# Patient Record
Sex: FEMALE | Race: BLACK OR AFRICAN AMERICAN | Employment: OTHER | URBAN - METROPOLITAN AREA
[De-identification: names, ages, dates, MRNs, and addresses within clinical notes are randomized per-mention and may not be internally consistent; named-entity substitution may affect disease eponyms.]

---

## 2021-06-04 ENCOUNTER — APPOINTMENT (OUTPATIENT)
Dept: CT IMAGING | Age: 76
DRG: 065 | End: 2021-06-04
Attending: STUDENT IN AN ORGANIZED HEALTH CARE EDUCATION/TRAINING PROGRAM
Payer: MEDICARE

## 2021-06-04 ENCOUNTER — HOSPITAL ENCOUNTER (INPATIENT)
Age: 76
LOS: 8 days | Discharge: NURSING, REHAB OR LONG-TERM CARE | DRG: 065 | End: 2021-06-12
Attending: STUDENT IN AN ORGANIZED HEALTH CARE EDUCATION/TRAINING PROGRAM | Admitting: FAMILY MEDICINE
Payer: MEDICARE

## 2021-06-04 ENCOUNTER — APPOINTMENT (OUTPATIENT)
Dept: MRI IMAGING | Age: 76
DRG: 065 | End: 2021-06-04
Attending: FAMILY MEDICINE
Payer: MEDICARE

## 2021-06-04 DIAGNOSIS — R29.810 FACIAL DROOP: ICD-10-CM

## 2021-06-04 DIAGNOSIS — I63.9 ACUTE ISCHEMIC STROKE (HCC): Primary | ICD-10-CM

## 2021-06-04 DIAGNOSIS — I67.2 INTRACRANIAL ATHEROSCLEROSIS: ICD-10-CM

## 2021-06-04 DIAGNOSIS — R47.1 DYSARTHRIA: ICD-10-CM

## 2021-06-04 DIAGNOSIS — E11.65 TYPE 2 DIABETES MELLITUS WITH HYPERGLYCEMIA, WITHOUT LONG-TERM CURRENT USE OF INSULIN (HCC): ICD-10-CM

## 2021-06-04 DIAGNOSIS — I67.9 INTRACRANIAL VASCULAR STENOSIS: ICD-10-CM

## 2021-06-04 LAB
ALBUMIN SERPL-MCNC: 3.2 G/DL (ref 3.5–5)
ALBUMIN/GLOB SERPL: 0.9 {RATIO} (ref 1.1–2.2)
ALP SERPL-CCNC: 103 U/L (ref 45–117)
ALT SERPL-CCNC: 17 U/L (ref 12–78)
ANION GAP SERPL CALC-SCNC: 6 MMOL/L (ref 5–15)
ASPIRIN TEST, ASPIRN: 393 ARU
AST SERPL-CCNC: 24 U/L (ref 15–37)
ATRIAL RATE: 67 BPM
BASOPHILS # BLD: 0 K/UL (ref 0–0.1)
BASOPHILS NFR BLD: 0 % (ref 0–1)
BILIRUB SERPL-MCNC: 0.3 MG/DL (ref 0.2–1)
BUN SERPL-MCNC: 24 MG/DL (ref 6–20)
BUN/CREAT SERPL: 17 (ref 12–20)
CALCIUM SERPL-MCNC: 8.9 MG/DL (ref 8.5–10.1)
CALCULATED P AXIS, ECG09: 57 DEGREES
CALCULATED R AXIS, ECG10: 10 DEGREES
CALCULATED T AXIS, ECG11: -97 DEGREES
CHLORIDE SERPL-SCNC: 107 MMOL/L (ref 97–108)
CK SERPL-CCNC: 255 U/L (ref 26–192)
CO2 SERPL-SCNC: 22 MMOL/L (ref 21–32)
COMMENT, HOLDF: NORMAL
CREAT SERPL-MCNC: 1.45 MG/DL (ref 0.55–1.02)
DIAGNOSIS, 93000: NORMAL
DIFFERENTIAL METHOD BLD: ABNORMAL
EOSINOPHIL # BLD: 0.1 K/UL (ref 0–0.4)
EOSINOPHIL NFR BLD: 2 % (ref 0–7)
ERYTHROCYTE [DISTWIDTH] IN BLOOD BY AUTOMATED COUNT: 15.3 % (ref 11.5–14.5)
GLOBULIN SER CALC-MCNC: 3.5 G/DL (ref 2–4)
GLUCOSE SERPL-MCNC: 213 MG/DL (ref 65–100)
HCT VFR BLD AUTO: 28 % (ref 35–47)
HGB BLD-MCNC: 8.4 G/DL (ref 11.5–16)
IMM GRANULOCYTES # BLD AUTO: 0 K/UL (ref 0–0.04)
IMM GRANULOCYTES NFR BLD AUTO: 0 % (ref 0–0.5)
INR PPP: 1.1 (ref 0.9–1.1)
IRON SATN MFR SERPL: 21 % (ref 20–50)
IRON SERPL-MCNC: 48 UG/DL (ref 35–150)
LYMPHOCYTES # BLD: 1.7 K/UL (ref 0.8–3.5)
LYMPHOCYTES NFR BLD: 30 % (ref 12–49)
MCH RBC QN AUTO: 25.2 PG (ref 26–34)
MCHC RBC AUTO-ENTMCNC: 30 G/DL (ref 30–36.5)
MCV RBC AUTO: 84.1 FL (ref 80–99)
MONOCYTES # BLD: 0.5 K/UL (ref 0–1)
MONOCYTES NFR BLD: 8 % (ref 5–13)
NEUTS SEG # BLD: 3.3 K/UL (ref 1.8–8)
NEUTS SEG NFR BLD: 60 % (ref 32–75)
NRBC # BLD: 0 K/UL (ref 0–0.01)
NRBC BLD-RTO: 0 PER 100 WBC
P-R INTERVAL, ECG05: 166 MS
P2Y12 PLT RESPONSE,PPPR: 280 PRU (ref 194–418)
PLATELET # BLD AUTO: 130 K/UL (ref 150–400)
PMV BLD AUTO: 12.6 FL (ref 8.9–12.9)
POTASSIUM SERPL-SCNC: 4.4 MMOL/L (ref 3.5–5.1)
PROT SERPL-MCNC: 6.7 G/DL (ref 6.4–8.2)
PROTHROMBIN TIME: 11 SEC (ref 9–11.1)
Q-T INTERVAL, ECG07: 398 MS
QRS DURATION, ECG06: 84 MS
QTC CALCULATION (BEZET), ECG08: 420 MS
RBC # BLD AUTO: 3.33 M/UL (ref 3.8–5.2)
SAMPLES BEING HELD,HOLD: NORMAL
SODIUM SERPL-SCNC: 135 MMOL/L (ref 136–145)
TIBC SERPL-MCNC: 226 UG/DL (ref 250–450)
TROPONIN I SERPL-MCNC: <0.05 NG/ML
TROPONIN I SERPL-MCNC: <0.05 NG/ML
VENTRICULAR RATE, ECG03: 67 BPM
WBC # BLD AUTO: 5.6 K/UL (ref 3.6–11)

## 2021-06-04 PROCEDURE — 70551 MRI BRAIN STEM W/O DYE: CPT

## 2021-06-04 PROCEDURE — 70450 CT HEAD/BRAIN W/O DYE: CPT

## 2021-06-04 PROCEDURE — 85576 BLOOD PLATELET AGGREGATION: CPT

## 2021-06-04 PROCEDURE — 36415 COLL VENOUS BLD VENIPUNCTURE: CPT

## 2021-06-04 PROCEDURE — 74011250637 HC RX REV CODE- 250/637: Performed by: FAMILY MEDICINE

## 2021-06-04 PROCEDURE — 0042T CT CODE NEURO PERF W CBF: CPT

## 2021-06-04 PROCEDURE — 80053 COMPREHEN METABOLIC PANEL: CPT

## 2021-06-04 PROCEDURE — 74011000636 HC RX REV CODE- 636: Performed by: RADIOLOGY

## 2021-06-04 PROCEDURE — 93005 ELECTROCARDIOGRAM TRACING: CPT

## 2021-06-04 PROCEDURE — 83540 ASSAY OF IRON: CPT

## 2021-06-04 PROCEDURE — 74011250637 HC RX REV CODE- 250/637: Performed by: STUDENT IN AN ORGANIZED HEALTH CARE EDUCATION/TRAINING PROGRAM

## 2021-06-04 PROCEDURE — 65660000000 HC RM CCU STEPDOWN

## 2021-06-04 PROCEDURE — 85610 PROTHROMBIN TIME: CPT

## 2021-06-04 PROCEDURE — 74011250636 HC RX REV CODE- 250/636: Performed by: FAMILY MEDICINE

## 2021-06-04 PROCEDURE — 85025 COMPLETE CBC W/AUTO DIFF WBC: CPT

## 2021-06-04 PROCEDURE — 74011000250 HC RX REV CODE- 250: Performed by: FAMILY MEDICINE

## 2021-06-04 PROCEDURE — 4A03X5D MEASUREMENT OF ARTERIAL FLOW, INTRACRANIAL, EXTERNAL APPROACH: ICD-10-PCS | Performed by: RADIOLOGY

## 2021-06-04 PROCEDURE — 99285 EMERGENCY DEPT VISIT HI MDM: CPT

## 2021-06-04 PROCEDURE — 84484 ASSAY OF TROPONIN QUANT: CPT

## 2021-06-04 PROCEDURE — 70498 CT ANGIOGRAPHY NECK: CPT

## 2021-06-04 PROCEDURE — 82550 ASSAY OF CK (CPK): CPT

## 2021-06-04 RX ORDER — SODIUM CHLORIDE 9 MG/ML
75 INJECTION, SOLUTION INTRAVENOUS CONTINUOUS
Status: DISPENSED | OUTPATIENT
Start: 2021-06-04 | End: 2021-06-05

## 2021-06-04 RX ORDER — CLOPIDOGREL BISULFATE 75 MG/1
75 TABLET ORAL DAILY
Status: DISCONTINUED | OUTPATIENT
Start: 2021-06-05 | End: 2021-06-05

## 2021-06-04 RX ORDER — ACETAMINOPHEN 650 MG/1
650 SUPPOSITORY RECTAL
Status: DISCONTINUED | OUTPATIENT
Start: 2021-06-04 | End: 2021-06-12 | Stop reason: HOSPADM

## 2021-06-04 RX ORDER — CLOPIDOGREL BISULFATE 75 MG/1
75 TABLET ORAL
Status: COMPLETED | OUTPATIENT
Start: 2021-06-04 | End: 2021-06-04

## 2021-06-04 RX ORDER — GUAIFENESIN 100 MG/5ML
81 LIQUID (ML) ORAL DAILY
Status: DISCONTINUED | OUTPATIENT
Start: 2021-06-05 | End: 2021-06-12 | Stop reason: HOSPADM

## 2021-06-04 RX ORDER — ACETAMINOPHEN 325 MG/1
650 TABLET ORAL
Status: DISCONTINUED | OUTPATIENT
Start: 2021-06-04 | End: 2021-06-12 | Stop reason: HOSPADM

## 2021-06-04 RX ORDER — ATORVASTATIN CALCIUM 40 MG/1
80 TABLET, FILM COATED ORAL
Status: DISCONTINUED | OUTPATIENT
Start: 2021-06-04 | End: 2021-06-12 | Stop reason: HOSPADM

## 2021-06-04 RX ADMIN — FAMOTIDINE 20 MG: 10 INJECTION, SOLUTION INTRAVENOUS at 18:54

## 2021-06-04 RX ADMIN — SODIUM CHLORIDE 75 ML/HR: 9 INJECTION, SOLUTION INTRAVENOUS at 19:37

## 2021-06-04 RX ADMIN — CLOPIDOGREL BISULFATE 75 MG: 75 TABLET ORAL at 14:21

## 2021-06-04 RX ADMIN — IOPAMIDOL 40 ML: 755 INJECTION, SOLUTION INTRAVENOUS at 11:03

## 2021-06-04 RX ADMIN — IOPAMIDOL 80 ML: 755 INJECTION, SOLUTION INTRAVENOUS at 11:04

## 2021-06-04 RX ADMIN — ATORVASTATIN CALCIUM 80 MG: 40 TABLET, FILM COATED ORAL at 22:07

## 2021-06-04 NOTE — ED NOTES
Bedside shift change report given to Nyla Waller RN  (oncoming nurse) by Nuno Newman RN (offgoing nurse). Report included the following information SBAR, Kardex, ED Summary, MAR, Accordion and Recent Results.

## 2021-06-04 NOTE — H&P
History & Physical    Primary Care Provider: Unknown, Provider  Source of Information: Patient     History of Presenting Illness: Osvaldo Phillips is a 77 y.o. female who presents with dysarthria    History is primary obtained from the patient    Patient reports that she went to bed at [facility] at around 10:00 last night. Woke up this morning and had a headache associated with slurred speech. Patient reports that she has had a stroke in the past and has left-sided deficit. Patient came to the ER as a code stroke, patient currently reports that all her symptoms have resolved. Patient denies any other complaints or problems. Patient was requested to be admitted to the hospitalist service. Patient denies any recent falls or injuries. The patient denies any Headache, blurry vision, sore throat, trouble swallowing, trouble with speech, chest pain, SOB, cough, fever, chills, N/V/D, abd pain, urinary symptoms, constipation, recent travels, sick contacts, focal or generalized neurological symptoms,  falls, injuries, rashes, contact with COVID-19 diagnosed patients, hematemesis, melena, hemoptysis, hematuria, rashes, denies starting any new medications and denies any other concerns or problems besides as mentioned above. Review of Systems:  A comprehensive review of systems was negative except for that written in the History of Present Illness. All other systems reviewed, pertinent positives and negatives noted in HPI    No past medical history on file. No past surgical history on file. Prior to Admission medications    Not on File     No Known Allergies   No family history on file. Family history was discussed with the patient, all pertinent and relevant details are mentioned as above, no other pertinent and relevant family history was noted on my discussion with the patient.   Patient specifically denies any history of Gaucher disease in the family  SOCIAL HISTORY:  Patient resides:  Independently x   Assisted Living    SNF    With family care       Smoking history:   None x   Former    Chronic      Alcohol history:   None    Social x   Chronic      Ambulates:   Independently x   w/cane    w/walker    w/wc    CODE STATUS:  DNR    Full x   Other      Objective:     Physical Exam:     Visit Vitals  BP (!) 168/97   Pulse 86   Temp 98.9 °F (37.2 °C)   Resp 12   Ht 5' 4\" (1.626 m)   Wt 77.1 kg (170 lb)   SpO2 94%   BMI 29.18 kg/m²      O2 Device: None (Room air)    General : alert x 3, awake, no acute distress, resting in bed, pleasant /female, appears to be stated age  HEENT: PEERL, EOMI, moist mucus membrane, TM clear  Neck: supple,   Chest: Clear to auscultation bilaterally   CVS: S1 S2 heard,   Abd: soft/ Non tender, non distended, BS physiological,   Ext: no clubbing, no cyanosis,   Neuro/Psych: pleasant mood and affect, CN 2-12 grossly intact, sensory grossly within normal limit, Strength 4/5 in LUE and LL, 5/5 right upper and right lower extremity weakness, DTR 1+ x 4  Skin: warm     EKG: Normal sinus rhythm with nonspecific ST changes. Data Review:     Recent Days:  Recent Labs     06/04/21  1106   WBC 5.6   HGB 8.4*   HCT 28.0*   *     Recent Labs     06/04/21  1106   *   K 4.4      CO2 22   *   BUN 24*   CREA 1.45*   CA 8.9   ALB 3.2*   ALT 17   INR 1.1     No results for input(s): PH, PCO2, PO2, HCO3, FIO2 in the last 72 hours. 24 Hour Results:  Recent Results (from the past 24 hour(s))   SAMPLES BEING HELD    Collection Time: 06/04/21 11:06 AM   Result Value Ref Range    SAMPLES BEING HELD 1RED     COMMENT        Add-on orders for these samples will be processed based on acceptable specimen integrity and analyte stability, which may vary by analyte.    CBC WITH AUTOMATED DIFF    Collection Time: 06/04/21 11:06 AM   Result Value Ref Range    WBC 5.6 3.6 - 11.0 K/uL    RBC 3.33 (L) 3.80 - 5.20 M/uL    HGB 8.4 (L) 11.5 - 16.0 g/dL HCT 28.0 (L) 35.0 - 47.0 %    MCV 84.1 80.0 - 99.0 FL    MCH 25.2 (L) 26.0 - 34.0 PG    MCHC 30.0 30.0 - 36.5 g/dL    RDW 15.3 (H) 11.5 - 14.5 %    PLATELET 767 (L) 721 - 400 K/uL    MPV 12.6 8.9 - 12.9 FL    NRBC 0.0 0  WBC    ABSOLUTE NRBC 0.00 0.00 - 0.01 K/uL    NEUTROPHILS 60 32 - 75 %    LYMPHOCYTES 30 12 - 49 %    MONOCYTES 8 5 - 13 %    EOSINOPHILS 2 0 - 7 %    BASOPHILS 0 0 - 1 %    IMMATURE GRANULOCYTES 0 0.0 - 0.5 %    ABS. NEUTROPHILS 3.3 1.8 - 8.0 K/UL    ABS. LYMPHOCYTES 1.7 0.8 - 3.5 K/UL    ABS. MONOCYTES 0.5 0.0 - 1.0 K/UL    ABS. EOSINOPHILS 0.1 0.0 - 0.4 K/UL    ABS. BASOPHILS 0.0 0.0 - 0.1 K/UL    ABS. IMM. GRANS. 0.0 0.00 - 0.04 K/UL    DF AUTOMATED     METABOLIC PANEL, COMPREHENSIVE    Collection Time: 06/04/21 11:06 AM   Result Value Ref Range    Sodium 135 (L) 136 - 145 mmol/L    Potassium 4.4 3.5 - 5.1 mmol/L    Chloride 107 97 - 108 mmol/L    CO2 22 21 - 32 mmol/L    Anion gap 6 5 - 15 mmol/L    Glucose 213 (H) 65 - 100 mg/dL    BUN 24 (H) 6 - 20 MG/DL    Creatinine 1.45 (H) 0.55 - 1.02 MG/DL    BUN/Creatinine ratio 17 12 - 20      GFR est AA 43 (L) >60 ml/min/1.73m2    GFR est non-AA 35 (L) >60 ml/min/1.73m2    Calcium 8.9 8.5 - 10.1 MG/DL    Bilirubin, total 0.3 0.2 - 1.0 MG/DL    ALT (SGPT) 17 12 - 78 U/L    AST (SGOT) 24 15 - 37 U/L    Alk. phosphatase 103 45 - 117 U/L    Protein, total 6.7 6.4 - 8.2 g/dL    Albumin 3.2 (L) 3.5 - 5.0 g/dL    Globulin 3.5 2.0 - 4.0 g/dL    A-G Ratio 0.9 (L) 1.1 - 2.2     PROTHROMBIN TIME + INR    Collection Time: 06/04/21 11:06 AM   Result Value Ref Range    INR 1.1 0.9 - 1.1      Prothrombin time 11.0 9.0 - 11.1 sec   TROPONIN I    Collection Time: 06/04/21 11:06 AM   Result Value Ref Range    Troponin-I, Qt. <0.05 <0.05 ng/mL         Imaging:     CTA CODE NEURO HEAD AND NECK W CONT    Result Date: 6/4/2021  1. Small matched perfusion defect in the location of the patient's chronic right frontal infarct.  Otherwise negative CT perfusion study, without perfusion mismatch. 2. No acute large vessel occlusion. 3. Moderate left internal carotid artery origin stenosis. Moderate to severe bilateral cavernous carotid stenoses. Irregularity of the middle cerebral arteries bilaterally without focal flow-limiting stenosis. CT CODE NEURO HEAD WO CONTRAST    Result Date: 6/4/2021  Old right frontal infarct and significant small vessel ischemic changes. No acute abnormality. CT CODE NEURO PERF W CBF    Result Date: 6/4/2021  1. Small matched perfusion defect in the location of the patient's chronic right frontal infarct. Otherwise negative CT perfusion study, without perfusion mismatch. 2. No acute large vessel occlusion. 3. Moderate left internal carotid artery origin stenosis. Moderate to severe bilateral cavernous carotid stenoses. Irregularity of the middle cerebral arteries bilaterally without focal flow-limiting stenosis. Assessment/Plan     TIA: Patient will be admitted on a telemetry bed, neurovascular checks, dual antiplatelets, aspirin and Plavix testing, neurovascular checks, MRI of the brain, echocardiogram, PT OT speech consult, supportive care, neurology consult, further intervention hospitalist, reassess as needed, continue monitor    Diabetes mellitus type 2: Patient with sliding scale along insulin, Accu-Cheks, diet control, close monitoring, further address per hospitalist, reassess as needed    CKD 3: Avoid nephrotoxic medication, renally dose all medication, supportive care, close monitoring, further intervention per hospital course, reassess as needed, trend creatinine      Anemia: Unclear whether acute on chronic, closely monitor, obtain records from PCP, iron panel, patient denies any hematemesis melena hemoptysis, reassess as needed    GI DVT prophylaxis: Patient will be on SCDs                         Please note that this dictation was completed with [employer], the [employer] voice recognition software.   Quite often unanticipated grammatical, syntax, homophones, and other interpretive errors are inadvertently transcribed by the computer software. Please disregard these errors. Please excuse any errors that have escaped final proofreading.          Signed By: Pavel Jurado MD     June 4, 2021

## 2021-06-04 NOTE — ED PROVIDER NOTES
Chief Complaint   Patient presents with    Dysarthria     This is a 29-year-old female with a history of previous stroke, the patient is what of a challenging historian and cannot tell me exactly what her residual deficits are only that she has had difficulty walking since then, she presents today by EMS as a code neuro activation for a complaint of severe headache that began at 0800 this morning associated with slurred speech. Last known normal when she went to sleep yesterday evening at 2100. She also endorses left-sided weakness and numbness on my exam.  Denies any anticoagulants. Accu-Chek was 260s in the field. She denies any neck pain or vomiting but does endorse some nausea, no complaints of vision changes. No chest or abdominal pain. She uses a cane at baseline and per EMS was able to walk at the time of their arrival with a steady gait. No other recent changes in her health. Symptoms are severe in nature without alleviating or exacerbating factors. No past medical history on file. CORRECTION:  Obtained by me, includes previous stroke    No past surgical history on file. CORRECTION:  Obtained by me, no prior CNS surgery      No family history on file.      CORRECTION:  Obtained by me, social history negative for drug or alcohol use    Social History     Socioeconomic History    Marital status: Not on file     Spouse name: Not on file    Number of children: Not on file    Years of education: Not on file    Highest education level: Not on file   Occupational History    Not on file   Tobacco Use    Smoking status: Not on file   Substance and Sexual Activity    Alcohol use: Not on file    Drug use: Not on file    Sexual activity: Not on file   Other Topics Concern    Not on file   Social History Narrative    Not on file     Social Determinants of Health     Financial Resource Strain:     Difficulty of Paying Living Expenses:    Food Insecurity:     Worried About Running Out of Food in the Last Year:    [address] in the Last Year:    Transportation Needs:     Lack of Transportation (Medical):  Lack of Transportation (Non-Medical):    Physical Activity:     Days of Exercise per Week:     Minutes of Exercise per Session:    Stress:     Feeling of Stress :    Social Connections:     Frequency of Communication with Friends and Family:     Frequency of Social Gatherings with Friends and Family:     Attends Christianity Services:     Active Member of Clubs or Organizations:     Attends Club or Organization Meetings:     Marital Status:    Intimate Partner Violence:     Fear of Current or Ex-Partner:     Emotionally Abused:     Physically Abused:     Sexually Abused: ALLERGIES: Patient has no known allergies. Review of Systems   Constitutional: Negative for fever. HENT: Negative for facial swelling. Eyes: Negative for visual disturbance. Respiratory: Positive for shortness of breath. Cardiovascular: Negative for chest pain. Gastrointestinal: Positive for nausea. Negative for abdominal pain and vomiting. Musculoskeletal: Negative for neck pain. Skin: Negative for rash. Neurological: Positive for weakness, numbness and headaches. Psychiatric/Behavioral: Positive for confusion. Vitals:    06/04/21 1108 06/04/21 1118 06/04/21 1215   BP: (!) 155/87 (!) 160/85 (!) 145/87   Pulse: 73 76 72   Resp: 18 14 17   Temp: 98.9 °F (37.2 °C)     SpO2: 96% 96% 98%   Weight: 77.1 kg (170 lb)     Height: 5' 4\" (1.626 m)              Physical Exam  General:  Awake and alert, NAD  HEENT:  NC/AT, equal pupils, moist mucous membranes  Neck:   Normal inspection, full range of motion  Cardiac:  RRR, no murmurs  Respiratory:  Clear bilaterally, no wheezes, rales, rhonchi  Abdomen:  Soft and nontender, nondistended  Extremities: Warm and well perfused, no peripheral edema  Neuro:  +Mild left sided facial asymmetry, mild to moderate dysarthria, no expressive aphasia.   Left pronator drift and diminished sensation subjectively to gross touch in the left upper and lower extremities  Skin:   No rashes or pallor    RESULTS  Recent Results (from the past 12 hour(s))   SAMPLES BEING HELD    Collection Time: 06/04/21 11:06 AM   Result Value Ref Range    SAMPLES BEING HELD 1RED     COMMENT        Add-on orders for these samples will be processed based on acceptable specimen integrity and analyte stability, which may vary by analyte. CBC WITH AUTOMATED DIFF    Collection Time: 06/04/21 11:06 AM   Result Value Ref Range    WBC 5.6 3.6 - 11.0 K/uL    RBC 3.33 (L) 3.80 - 5.20 M/uL    HGB 8.4 (L) 11.5 - 16.0 g/dL    HCT 28.0 (L) 35.0 - 47.0 %    MCV 84.1 80.0 - 99.0 FL    MCH 25.2 (L) 26.0 - 34.0 PG    MCHC 30.0 30.0 - 36.5 g/dL    RDW 15.3 (H) 11.5 - 14.5 %    PLATELET 464 (L) 204 - 400 K/uL    MPV 12.6 8.9 - 12.9 FL    NRBC 0.0 0  WBC    ABSOLUTE NRBC 0.00 0.00 - 0.01 K/uL    NEUTROPHILS 60 32 - 75 %    LYMPHOCYTES 30 12 - 49 %    MONOCYTES 8 5 - 13 %    EOSINOPHILS 2 0 - 7 %    BASOPHILS 0 0 - 1 %    IMMATURE GRANULOCYTES 0 0.0 - 0.5 %    ABS. NEUTROPHILS 3.3 1.8 - 8.0 K/UL    ABS. LYMPHOCYTES 1.7 0.8 - 3.5 K/UL    ABS. MONOCYTES 0.5 0.0 - 1.0 K/UL    ABS. EOSINOPHILS 0.1 0.0 - 0.4 K/UL    ABS. BASOPHILS 0.0 0.0 - 0.1 K/UL    ABS. IMM. GRANS. 0.0 0.00 - 0.04 K/UL    DF AUTOMATED     METABOLIC PANEL, COMPREHENSIVE    Collection Time: 06/04/21 11:06 AM   Result Value Ref Range    Sodium 135 (L) 136 - 145 mmol/L    Potassium 4.4 3.5 - 5.1 mmol/L    Chloride 107 97 - 108 mmol/L    CO2 22 21 - 32 mmol/L    Anion gap 6 5 - 15 mmol/L    Glucose 213 (H) 65 - 100 mg/dL    BUN 24 (H) 6 - 20 MG/DL    Creatinine 1.45 (H) 0.55 - 1.02 MG/DL    BUN/Creatinine ratio 17 12 - 20      GFR est AA 43 (L) >60 ml/min/1.73m2    GFR est non-AA 35 (L) >60 ml/min/1.73m2    Calcium 8.9 8.5 - 10.1 MG/DL    Bilirubin, total 0.3 0.2 - 1.0 MG/DL    ALT (SGPT) 17 12 - 78 U/L    AST (SGOT) 24 15 - 37 U/L    Alk. phosphatase 103 45 - 117 U/L    Protein, total 6.7 6.4 - 8.2 g/dL    Albumin 3.2 (L) 3.5 - 5.0 g/dL    Globulin 3.5 2.0 - 4.0 g/dL    A-G Ratio 0.9 (L) 1.1 - 2.2     PROTHROMBIN TIME + INR    Collection Time: 06/04/21 11:06 AM   Result Value Ref Range    INR 1.1 0.9 - 1.1      Prothrombin time 11.0 9.0 - 11.1 sec   TROPONIN I    Collection Time: 06/04/21 11:06 AM   Result Value Ref Range    Troponin-I, Qt. <0.05 <0.05 ng/mL        IMAGING  CTA CODE NEURO HEAD AND NECK W CONT    Result Date: 6/4/2021  1. Small matched perfusion defect in the location of the patient's chronic right frontal infarct. Otherwise negative CT perfusion study, without perfusion mismatch. 2. No acute large vessel occlusion. 3. Moderate left internal carotid artery origin stenosis. Moderate to severe bilateral cavernous carotid stenoses. Irregularity of the middle cerebral arteries bilaterally without focal flow-limiting stenosis. CT CODE NEURO HEAD WO CONTRAST    Result Date: 6/4/2021  Old right frontal infarct and significant small vessel ischemic changes. No acute abnormality. CT CODE NEURO PERF W CBF    Result Date: 6/4/2021  1. Small matched perfusion defect in the location of the patient's chronic right frontal infarct. Otherwise negative CT perfusion study, without perfusion mismatch. 2. No acute large vessel occlusion. 3. Moderate left internal carotid artery origin stenosis. Moderate to severe bilateral cavernous carotid stenoses. Irregularity of the middle cerebral arteries bilaterally without focal flow-limiting stenosis. Procedures - none unless documented below  EKG as interpreted by me:  Normal sinus rhythm at a rate of 67, normal axis and intervals, T wave inversions in the lateral precordial leads, no ST elevations suggesting acute ischemia. ED course: Labs, EKG and imaging reviewed. No large vessel occlusion or hemorrhage on imaging.   NIH stroke score of 4 at the time of my initial exam.  Given her LNN>4.5 hours ago she is not an appropriate candidate for systemic thrombolytics. Case discussed with teleneurology [facility]) who recommended Plavix 75 mg in addition to her home dose of aspirin, admission for MRI and continued stroke investigation. Discussed with the hospitalist.    [address] for Admission  1:11 PM    ED Room Number:   ER24/24  Patient Name and age: Javon Mcqueen 77 y.o.  female  Working Diagnosis:     1. Acute ischemic stroke ([address]    2. Facial droop    3. Dysarthria      COVID suspicion:   no  Code Status:    Full Code  Readmission:    no  Isolation Requirements:  no  Recommended Level of Care: telemetry  Department:    [address] [place] [address] Adult ED - 21   Other:     Code neuro activation for left sided facial droop, dysarthria, left upper weakness and sensory deficits in the left upper and lowers, hx prior stroke. Unclear how much is acute versus chronic. Seen by teleneurology who recommended Plavix 75 mg and MRI.

## 2021-06-04 NOTE — ED TRIAGE NOTES
Triage Note: Patient arrives via EMS with Stroke Alert. LKW was last night at 2100. Patient is coming in with slurred speech upon waking this morning and headache. Patient has had Stroke in the past with left sided deficit.   and /82

## 2021-06-05 ENCOUNTER — APPOINTMENT (OUTPATIENT)
Dept: NON INVASIVE DIAGNOSTICS | Age: 76
DRG: 065 | End: 2021-06-05
Attending: FAMILY MEDICINE
Payer: MEDICARE

## 2021-06-05 ENCOUNTER — APPOINTMENT (OUTPATIENT)
Dept: GENERAL RADIOLOGY | Age: 76
DRG: 065 | End: 2021-06-05
Attending: INTERNAL MEDICINE
Payer: MEDICARE

## 2021-06-05 PROBLEM — I67.1 ANEURYSM OF MIDDLE CEREBRAL ARTERY: Status: ACTIVE | Noted: 2021-06-04

## 2021-06-05 PROBLEM — I65.21 STENOSIS OF RIGHT CAROTID ARTERY: Status: ACTIVE | Noted: 2021-06-04

## 2021-06-05 PROBLEM — I67.2 INTRACRANIAL ATHEROSCLEROSIS: Status: ACTIVE | Noted: 2021-06-04

## 2021-06-05 LAB
CHOLEST SERPL-MCNC: 243 MG/DL
CK SERPL-CCNC: 319 U/L (ref 26–192)
CRP SERPL HS-MCNC: 5.1 MG/L
ECHO AO ROOT DIAM: 2.98 CM
ECHO AV AREA PEAK VELOCITY: 1.92 CM2
ECHO AV AREA/BSA PEAK VELOCITY: 1 CM2/M2
ECHO AV PEAK GRADIENT: 7.62 MMHG
ECHO AV PEAK VELOCITY: 137.98 CM/S
ECHO LA AREA 4C: 21.37 CM2
ECHO LA MAJOR AXIS: 3.88 CM
ECHO LA MINOR AXIS: 2.07 CM
ECHO LA VOL 2C: 57.49 ML (ref 22–52)
ECHO LA VOL 4C: 62.38 ML (ref 22–52)
ECHO LA VOL BP: 65.84 ML (ref 22–52)
ECHO LA VOL/BSA BIPLANE: 35.21 ML/M2 (ref 16–28)
ECHO LA VOLUME INDEX A2C: 30.74 ML/M2 (ref 16–28)
ECHO LA VOLUME INDEX A4C: 33.36 ML/M2 (ref 16–28)
ECHO LV E' LATERAL VELOCITY: 5.35 CM/S
ECHO LV E' SEPTAL VELOCITY: 3.59 CM/S
ECHO LV INTERNAL DIMENSION DIASTOLIC: 3.98 CM (ref 3.9–5.3)
ECHO LV INTERNAL DIMENSION SYSTOLIC: 2.58 CM
ECHO LV IVSD: 1.3 CM (ref 0.6–0.9)
ECHO LV MASS 2D: 178.8 G (ref 67–162)
ECHO LV MASS INDEX 2D: 95.6 G/M2 (ref 43–95)
ECHO LV POSTERIOR WALL DIASTOLIC: 1.24 CM (ref 0.6–0.9)
ECHO LVOT DIAM: 2 CM
ECHO LVOT PEAK GRADIENT: 2.87 MMHG
ECHO LVOT PEAK VELOCITY: 84.7 CM/S
ECHO MV A VELOCITY: 84.99 CM/S
ECHO MV AREA PHT: 4.54 CM2
ECHO MV E DECELERATION TIME (DT): 166.93 MS
ECHO MV E VELOCITY: 67.53 CM/S
ECHO MV E/A RATIO: 0.79
ECHO MV E/E' LATERAL: 12.62
ECHO MV E/E' RATIO (AVERAGED): 15.72
ECHO MV E/E' SEPTAL: 18.81
ECHO MV PRESSURE HALF TIME (PHT): 48.41 MS
ECHO PV MAX VELOCITY: 89.11 CM/S
ECHO PV PEAK INSTANTANEOUS GRADIENT SYSTOLIC: 3.18 MMHG
ECHO PV REGURGITANT MAX VELOCITY: 114.17 CM/S
ECHO RV INTERNAL DIMENSION: 3.11 CM
ECHO RV TAPSE: 1.66 CM (ref 1.5–2)
ECHO TV REGURGITANT MAX VELOCITY: 276.6 CM/S
ECHO TV REGURGITANT PEAK GRADIENT: 30.6 MMHG
ERYTHROCYTE [DISTWIDTH] IN BLOOD BY AUTOMATED COUNT: 15.4 % (ref 11.5–14.5)
ERYTHROCYTE [SEDIMENTATION RATE] IN BLOOD: 46 MM/HR (ref 0–30)
EST. AVERAGE GLUCOSE BLD GHB EST-MCNC: 189 MG/DL
GLUCOSE BLD STRIP.AUTO-MCNC: 310 MG/DL (ref 65–117)
HBA1C MFR BLD: 8.2 % (ref 4–5.6)
HCT VFR BLD AUTO: 30.9 % (ref 35–47)
HDLC SERPL-MCNC: 37 MG/DL
HDLC SERPL: 6.6 {RATIO} (ref 0–5)
HGB BLD-MCNC: 9.5 G/DL (ref 11.5–16)
LDLC SERPL CALC-MCNC: ABNORMAL MG/DL (ref 0–100)
LDLC SERPL DIRECT ASSAY-MCNC: 82 MG/DL (ref 0–100)
MCH RBC QN AUTO: 25.3 PG (ref 26–34)
MCHC RBC AUTO-ENTMCNC: 30.7 G/DL (ref 30–36.5)
MCV RBC AUTO: 82.4 FL (ref 80–99)
NRBC # BLD: 0 K/UL (ref 0–0.01)
NRBC BLD-RTO: 0 PER 100 WBC
PLATELET # BLD AUTO: 160 K/UL (ref 150–400)
PMV BLD AUTO: 12.6 FL (ref 8.9–12.9)
RBC # BLD AUTO: 3.75 M/UL (ref 3.8–5.2)
SERVICE CMNT-IMP: ABNORMAL
TRIGL SERPL-MCNC: 534 MG/DL (ref ?–150)
TROPONIN I SERPL-MCNC: <0.05 NG/ML
TSH SERPL DL<=0.05 MIU/L-ACNC: 1.38 UIU/ML (ref 0.36–3.74)
VLDLC SERPL CALC-MCNC: ABNORMAL MG/DL
WBC # BLD AUTO: 6.9 K/UL (ref 3.6–11)

## 2021-06-05 PROCEDURE — 84484 ASSAY OF TROPONIN QUANT: CPT

## 2021-06-05 PROCEDURE — 85027 COMPLETE CBC AUTOMATED: CPT

## 2021-06-05 PROCEDURE — 83721 ASSAY OF BLOOD LIPOPROTEIN: CPT

## 2021-06-05 PROCEDURE — 97162 PT EVAL MOD COMPLEX 30 MIN: CPT

## 2021-06-05 PROCEDURE — 97535 SELF CARE MNGMENT TRAINING: CPT

## 2021-06-05 PROCEDURE — 74011250637 HC RX REV CODE- 250/637: Performed by: FAMILY MEDICINE

## 2021-06-05 PROCEDURE — 97165 OT EVAL LOW COMPLEX 30 MIN: CPT

## 2021-06-05 PROCEDURE — 36415 COLL VENOUS BLD VENIPUNCTURE: CPT

## 2021-06-05 PROCEDURE — 94640 AIRWAY INHALATION TREATMENT: CPT

## 2021-06-05 PROCEDURE — 65660000000 HC RM CCU STEPDOWN

## 2021-06-05 PROCEDURE — 74011636637 HC RX REV CODE- 636/637: Performed by: INTERNAL MEDICINE

## 2021-06-05 PROCEDURE — 97530 THERAPEUTIC ACTIVITIES: CPT

## 2021-06-05 PROCEDURE — 74011000250 HC RX REV CODE- 250: Performed by: INTERNAL MEDICINE

## 2021-06-05 PROCEDURE — 97161 PT EVAL LOW COMPLEX 20 MIN: CPT

## 2021-06-05 PROCEDURE — 82550 ASSAY OF CK (CPK): CPT

## 2021-06-05 PROCEDURE — 82962 GLUCOSE BLOOD TEST: CPT

## 2021-06-05 PROCEDURE — 84443 ASSAY THYROID STIM HORMONE: CPT

## 2021-06-05 PROCEDURE — 83036 HEMOGLOBIN GLYCOSYLATED A1C: CPT

## 2021-06-05 PROCEDURE — 93306 TTE W/DOPPLER COMPLETE: CPT | Performed by: SPECIALIST

## 2021-06-05 PROCEDURE — 74011250637 HC RX REV CODE- 250/637: Performed by: PSYCHIATRY & NEUROLOGY

## 2021-06-05 PROCEDURE — 93306 TTE W/DOPPLER COMPLETE: CPT

## 2021-06-05 PROCEDURE — 74011250637 HC RX REV CODE- 250/637: Performed by: INTERNAL MEDICINE

## 2021-06-05 PROCEDURE — 74011250636 HC RX REV CODE- 250/636: Performed by: FAMILY MEDICINE

## 2021-06-05 PROCEDURE — 85652 RBC SED RATE AUTOMATED: CPT

## 2021-06-05 PROCEDURE — 86141 C-REACTIVE PROTEIN HS: CPT

## 2021-06-05 PROCEDURE — 80061 LIPID PANEL: CPT

## 2021-06-05 PROCEDURE — 94664 DEMO&/EVAL PT USE INHALER: CPT

## 2021-06-05 PROCEDURE — 71045 X-RAY EXAM CHEST 1 VIEW: CPT

## 2021-06-05 PROCEDURE — 99223 1ST HOSP IP/OBS HIGH 75: CPT | Performed by: PSYCHIATRY & NEUROLOGY

## 2021-06-05 PROCEDURE — 74011000250 HC RX REV CODE- 250: Performed by: FAMILY MEDICINE

## 2021-06-05 PROCEDURE — 97116 GAIT TRAINING THERAPY: CPT

## 2021-06-05 RX ORDER — BACLOFEN 10 MG/1
2.5 TABLET ORAL 3 TIMES DAILY
Status: DISCONTINUED | OUTPATIENT
Start: 2021-06-05 | End: 2021-06-12 | Stop reason: HOSPADM

## 2021-06-05 RX ORDER — BACLOFEN 10 MG/1
5 TABLET ORAL 3 TIMES DAILY
Status: DISCONTINUED | OUTPATIENT
Start: 2021-06-05 | End: 2021-06-05

## 2021-06-05 RX ORDER — MAGNESIUM SULFATE 100 %
4 CRYSTALS MISCELLANEOUS AS NEEDED
Status: DISCONTINUED | OUTPATIENT
Start: 2021-06-05 | End: 2021-06-12 | Stop reason: HOSPADM

## 2021-06-05 RX ORDER — AMLODIPINE BESYLATE 5 MG/1
5 TABLET ORAL DAILY
Status: DISCONTINUED | OUTPATIENT
Start: 2021-06-05 | End: 2021-06-06

## 2021-06-05 RX ORDER — PANTOPRAZOLE SODIUM 40 MG/1
40 TABLET, DELAYED RELEASE ORAL 2 TIMES DAILY
Status: DISCONTINUED | OUTPATIENT
Start: 2021-06-05 | End: 2021-06-12 | Stop reason: HOSPADM

## 2021-06-05 RX ORDER — FLUTICASONE PROPIONATE 50 MCG
2 SPRAY, SUSPENSION (ML) NASAL DAILY
Status: DISCONTINUED | OUTPATIENT
Start: 2021-06-05 | End: 2021-06-12 | Stop reason: HOSPADM

## 2021-06-05 RX ORDER — INSULIN LISPRO 100 [IU]/ML
INJECTION, SOLUTION INTRAVENOUS; SUBCUTANEOUS
Status: DISCONTINUED | OUTPATIENT
Start: 2021-06-05 | End: 2021-06-12 | Stop reason: HOSPADM

## 2021-06-05 RX ORDER — GUAIFENESIN 600 MG/1
600 TABLET, EXTENDED RELEASE ORAL 2 TIMES DAILY
Status: DISCONTINUED | OUTPATIENT
Start: 2021-06-05 | End: 2021-06-12 | Stop reason: HOSPADM

## 2021-06-05 RX ORDER — BENZONATATE 100 MG/1
100 CAPSULE ORAL
Status: DISCONTINUED | OUTPATIENT
Start: 2021-06-05 | End: 2021-06-12 | Stop reason: HOSPADM

## 2021-06-05 RX ORDER — DEXTROSE 50 % IN WATER (D50W) INTRAVENOUS SYRINGE
25-50 AS NEEDED
Status: DISCONTINUED | OUTPATIENT
Start: 2021-06-05 | End: 2021-06-12 | Stop reason: HOSPADM

## 2021-06-05 RX ORDER — ALBUTEROL SULFATE 0.83 MG/ML
2.5 SOLUTION RESPIRATORY (INHALATION)
Status: DISCONTINUED | OUTPATIENT
Start: 2021-06-05 | End: 2021-06-06

## 2021-06-05 RX ORDER — ALBUTEROL SULFATE 0.83 MG/ML
2.5 SOLUTION RESPIRATORY (INHALATION)
Status: DISCONTINUED | OUTPATIENT
Start: 2021-06-05 | End: 2021-06-12 | Stop reason: HOSPADM

## 2021-06-05 RX ADMIN — ATORVASTATIN CALCIUM 80 MG: 40 TABLET, FILM COATED ORAL at 21:00

## 2021-06-05 RX ADMIN — PANTOPRAZOLE SODIUM 40 MG: 40 TABLET, DELAYED RELEASE ORAL at 18:30

## 2021-06-05 RX ADMIN — BACLOFEN 2.5 MG: 10 TABLET ORAL at 16:53

## 2021-06-05 RX ADMIN — TICAGRELOR 30 MG: 60 TABLET ORAL at 20:52

## 2021-06-05 RX ADMIN — AMLODIPINE BESYLATE 5 MG: 5 TABLET ORAL at 16:53

## 2021-06-05 RX ADMIN — ALBUTEROL SULFATE 2.5 MG: 2.5 SOLUTION RESPIRATORY (INHALATION) at 19:46

## 2021-06-05 RX ADMIN — FLUTICASONE PROPIONATE 2 SPRAY: 50 SPRAY, METERED NASAL at 18:29

## 2021-06-05 RX ADMIN — TICAGRELOR 30 MG: 60 TABLET ORAL at 08:30

## 2021-06-05 RX ADMIN — SODIUM CHLORIDE 75 ML/HR: 9 INJECTION, SOLUTION INTRAVENOUS at 16:57

## 2021-06-05 RX ADMIN — GUAIFENESIN 600 MG: 600 TABLET, EXTENDED RELEASE ORAL at 18:29

## 2021-06-05 RX ADMIN — ASPIRIN 81 MG: 81 TABLET, CHEWABLE ORAL at 08:31

## 2021-06-05 RX ADMIN — BACLOFEN 2.5 MG: 10 TABLET ORAL at 10:44

## 2021-06-05 RX ADMIN — BACLOFEN 2.5 MG: 10 TABLET ORAL at 21:00

## 2021-06-05 RX ADMIN — INSULIN LISPRO 4 UNITS: 100 INJECTION, SOLUTION INTRAVENOUS; SUBCUTANEOUS at 21:02

## 2021-06-05 RX ADMIN — FAMOTIDINE 20 MG: 10 INJECTION, SOLUTION INTRAVENOUS at 16:53

## 2021-06-05 RX ADMIN — ACETAMINOPHEN 650 MG: 325 TABLET ORAL at 06:35

## 2021-06-05 NOTE — PROGRESS NOTES
Problem: Mobility Impaired (Adult and Pediatric)  Goal: *Acute Goals and Plan of Care (Insert Text)  Outcome: Not Met  Note:   FUNCTIONAL STATUS PRIOR TO ADMISSION: Patient was modified independent using a single point cane for functional mobility. HOME SUPPORT PRIOR TO ADMISSION: The patient lived with 2 sons who provided support but is left alone for prolonged periods of time. Physical Therapy Goals  Initiated 6/5/2021  1. Patient will move from supine to sit and sit to supine  in bed with modified independence within 7 day(s). 2.  Patient will transfer from bed to chair and chair to bed with modified independence using the least restrictive device within 7 day(s). 3.  Patient will ambulate with modified independence for 200 feet with the least restrictive device within 7 day(s). 4.  Patient will ascend/descend 4 stairs with bilateral handrail(s) with modified independence within 7 day(s). 5.  Patient will improve Quinonez Balance score by 7 points within 7 days. PHYSICAL THERAPY EVALUATION- NEURO POPULATION  Patient: Laura Kaur (77 y.o. female)  Date: 6/5/2021  Primary Diagnosis: CVA (cerebral vascular accident) [facility]) [I63.9]        Precautions:   Fall      ASSESSMENT  Based on the objective data described below, the patient presents with decreased activity tolerance, functional mobility, strength, sensation resulting in increased fall risk. Pt mobilized at a CGA->MOD Independent level during session. Pt demonstrated 1 instance of L LE buckling while performing standing balance activities at EOB however was able to maintain balance without assistance from PT. Pt complained of fatigue during session. At this time, unsure of how reliable of a narrator pt is due to changing story of PLOF and DME at house. Will need to follow up with family for accuracy.  Pt states she ambulated with [facility] for household distances and utilized a  for community distances however pt also stated to ambulating with [facility] in store due to [facility] being broken. Pt remains fall risk as demonstrated by CONNORS and 5xSTS results. Current Level of Function Impacting Discharge (mobility/balance): CGA, L sided weakness,     Functional Outcome Measure: The patient scored Total: 34/56 on the Connors Balance Assessment which is indicative of moderate fall risk. Other factors to consider for discharge: prolonged periods of without support in house, prior level of function     Patient will benefit from skilled therapy intervention to address the above noted impairments. PLAN :  Recommendations and Planned Interventions: bed mobility training, transfer training, gait training, therapeutic exercises, neuromuscular re-education, patient and family training/education, and therapeutic activities      Frequency/Duration: Patient will be followed by physical therapy:  5 times a week to address goals. Recommendation for discharge: (in order for the patient to meet his/her long term goals)  Therapy 3 hours per day 5-7 days per week    This discharge recommendation:  Has not yet been discussed the attending provider and/or case management    IF patient discharges home will need the following DME: Pt claims possession of SPC, RW, WC (unclear if broken), however unreliable narrator and needs to be confirmed with family. IF pt possess all of above then she should not have any DME needs to d/c home. SUBJECTIVE:   Patient stated my nose will not stop running.     OBJECTIVE DATA SUMMARY:   HISTORY:    No past medical history on file. No past surgical history on file.     Personal factors and/or comorbidities impacting plan of care: emotional distress, unreliable narrator    Home Situation  Home Environment: Private residence  # Steps to Enter: 4  Rails to Enter: Yes  Hand Rails : Bilateral  One/Two Story Residence: One story  Living Alone: No  Support Systems: Family member(s), Friends \ neighbors  Patient Expects to be Discharged to[facility][de-identified][facility]  Current DME Used/Available at Home: Cane, straight, Walker, rolling, Other (comment) (pt changed story during session. Check with family)    EXAMINATION/PRESENTATION/DECISION MAKING:   Critical Behavior:  Neurologic State: Alert, Eyes open spontaneously  Orientation Level: Oriented X4  Cognition: Appropriate decision making, Appropriate for age attention/concentration, Appropriate safety awareness, Follows commands     Hearing: Auditory  Auditory Impairment: None  Range Of Motion:  AROM: Within functional limits       PROM: Within functional limits       Strength:    Strength:  (L UE 4/5, L LE 4/5)       Tone & Sensation:     Sensation: Impaired (L LE)       Functional Mobility:  Bed Mobility:  Rolling: Modified independent;Bed Modified  Supine to Sit: Modified independent;Bed Modified     Scooting: Modified independent  Transfers:  Sit to Stand: Modified independent  Stand to Sit: Modified independent        Bed to Chair: Contact guard assistance    Balance:   Sitting: Intact; Without support  Standing: Intact; Without support  Ambulation/Gait Training:  Distance (ft): 5 Feet (ft)  Assistive Device: Gait belt  Ambulation - Level of Assistance: Contact guard assistance     Gait Abnormalities: Shuffling gait; Decreased step clearance (one instance of L side buckling that pt corrected)    Base of Support: Narrowed     Speed/Yuki: Shuffled; Slow  Step Length: Left shortened;Right shortened      Functional Measure  Quinonez Balance Test:    Sitting to Standin  Standing Unsupported: 2  Sitting with Back Unsupported: 3  Standing to Sitting: 3  Transfers: 4  Standing Unsupported with Eyes Closed: 3  Standing Unsupported with Feet Together: 2  Reach Forward with Outstretched Arm: 3   Object: 2  Turn to Look Over Shoulders: 2  Turn 360 Degrees: 1  Alternate Foot on Step/Stool: 3  Standing Unsupported One Foot in Front: 1  Stand on One Le  Total: 34/56         56=Maximum possible score;   0-20=High fall risk  21-40=Moderate fall risk   41-56=Low fall risk        Five times sit to stand:    Five Times Sit to Stand: 20 Seconds         Normative averages:  Clients younger than 61years old  £  10 seconds = Normal   Clients older than 61years old £ 14.2 seconds = Normal   Change of ³ 2.3 seconds shows a significant clinical improvement    Vanessa EM. Reference values for the five repetition sit to stand test: a descriptive metaanalysis of data from elders. Percept Mot Skills 2006; 103(1):215-222. Physical Therapy Evaluation Charge Determination   History Examination Presentation Decision-Making   MEDIUM  Complexity : 1-2 comorbidities / personal factors will impact the outcome/ POC  MEDIUM Complexity : 3 Standardized tests and measures addressing body structure, function, activity limitation and / or participation in recreation  MEDIUM Complexity : Evolving with changing characteristics  Other outcome measures CONNORS  MEDIUM      Based on the above components, the patient evaluation is determined to be of the following complexity level: MEDIUM    Pain Rating:  Pt denied during session    Activity Tolerance:   Fair and requires rest breaks    After treatment patient left in no apparent distress:   Sitting in chair, Call bell within reach, and Bed / chair alarm activated    COMMUNICATION/EDUCATION:   The patients plan of care was discussed with: Registered nurse. Patient and/or family was verbally educated on the BE FAST acronym for signs/symptoms of CVA and TIA. Informed patient to refer to the Stroke Binder for further BE FAST information. All questions answered with patient indicating fair understanding. Fall prevention education was provided and the patient/caregiver indicated understanding., Patient/family have participated as able in goal setting and plan of care. , and Patient/family agree to work toward stated goals and plan of care.     Thank you for this referral.  Julius Bejarano, PT   Time Calculation: 40 mins

## 2021-06-05 NOTE — PROGRESS NOTES
Bedside shift change report given to Davie Kidd RN (oncoming nurse) by Ida Ying RN (offgoing nurse). Report included the following information SBAR, Kardex, Intake/Output, MAR, Recent Results, Cardiac Rhythm NSR and Dual Neuro Assessment.

## 2021-06-05 NOTE — ROUTINE PROCESS
TRANSFER - OUT REPORT:    Verbal report given to Ida Ying (name) on Amish [place]  being transferred to [facility] (unit) for routine progression of care       Report consisted of patients Situation, Background, Assessment and   Recommendations(SBAR). Information from the following report(s) SBAR, ED Summary and Recent Results was reviewed with the receiving nurse. Lines:   Peripheral IV 06/04/21 Forearm (Active)   Site Assessment Clean, dry, & intact 06/04/21 1047   Phlebitis Assessment 0 06/04/21 1047   Infiltration Assessment 0 06/04/21 1047   Dressing Status Clean, dry, & intact 06/04/21 1047   Hub Color/Line Status Pink 06/04/21 1047       Peripheral IV 06/04/21 Right Antecubital (Active)   Site Assessment Clean, dry, & intact 06/04/21 1912   Phlebitis Assessment 0 06/04/21 1912   Infiltration Assessment 0 06/04/21 1912   Dressing Status Clean, dry, & intact 06/04/21 1912   Dressing Type Transparent 06/04/21 1912   Hub Color/Line Status Pink 06/04/21 1912   Action Taken Blood drawn 06/04/21 1912        Opportunity for questions and clarification was provided.       Patient transported with:   [employer] [facility]

## 2021-06-05 NOTE — PROGRESS NOTES
Problem: Diabetes Self-Management  Goal: *Disease process and treatment process  Description: Define diabetes and identify own type of diabetes; list 3 options for treating diabetes. Outcome: Progressing Towards Goal  Goal: *Incorporating nutritional management into lifestyle  Description: Describe effect of type, amount and timing of food on blood glucose; list 3 methods for planning meals. Outcome: Progressing Towards Goal  Goal: *Incorporating physical activity into lifestyle  Description: State effect of exercise on blood glucose levels. Outcome: Progressing Towards Goal  Goal: *Developing strategies to promote health/change behavior  Description: Define the ABC's of diabetes; identify appropriate screenings, schedule and personal plan for screenings. Outcome: Progressing Towards Goal  Goal: *Using medications safely  Description: State effect of diabetes medications on diabetes; name diabetes medication taking, action and side effects. Outcome: Progressing Towards Goal  Goal: *Monitoring blood glucose, interpreting and using results  Description: Identify recommended blood glucose targets  and personal targets. Outcome: Progressing Towards Goal  Goal: *Prevention, detection, treatment of acute complications  Description: List symptoms of hyper- and hypoglycemia; describe how to treat low blood sugar and actions for lowering  high blood glucose level. Outcome: Progressing Towards Goal  Goal: *Prevention, detection and treatment of chronic complications  Description: Define the natural course of diabetes and describe the relationship of blood glucose levels to long term complications of diabetes.   Outcome: Progressing Towards Goal  Goal: *Developing strategies to address psychosocial issues  Description: Describe feelings about living with diabetes; identify support needed and support network  Outcome: Progressing Towards Goal  Goal: *Insulin pump training  Outcome: Progressing Towards Goal  Goal: *Sick day guidelines  Outcome: Progressing Towards Goal  Goal: *Patient Specific Goal (EDIT GOAL, INSERT TEXT)  Outcome: Progressing Towards Goal     Problem: Patient Education: Go to Patient Education Activity  Goal: Patient/Family Education  Outcome: Progressing Towards Goal     Problem: Falls - Risk of  Goal: *Absence of Falls  Description: Document Marta Spare Fall Risk and appropriate interventions in the flowsheet.   Outcome: Progressing Towards Goal  Note: Fall Risk Interventions:            Medication Interventions: Bed/chair exit alarm, Patient to call before getting OOB    Elimination Interventions: Call light in reach, Bed/chair exit alarm, Patient to call for help with toileting needs    History of Falls Interventions: Bed/chair exit alarm, Door open when patient unattended         Problem: Patient Education: Go to Patient Education Activity  Goal: Patient/Family Education  Outcome: Progressing Towards Goal

## 2021-06-05 NOTE — PROGRESS NOTES
Problem: Self Care Deficits Care Plan (Adult)  Goal: *Acute Goals and Plan of Care (Insert Text)  Description:   FUNCTIONAL STATUS PRIOR TO ADMISSION: Patient was modified independent using a single point cane for functional mobility, largely Mod I for basic and some instrumental ADLs with inconsistent family assist.     HOME SUPPORT: The patient lived with her sons who work during the day leaving her alone for prolonged periods of time. Occupational Therapy Goals  Initiated 6/5/2021  1. Patient will perform grooming at the sink with supervision/set-up within 7 day(s). 2.  Patient will perform bathing with supervision/set-up within 7 day(s). 3.  Patient will perform upper and lower body dressing with supervision/set-up within 7 day(s). 4.  Patient will perform toilet transfers with supervision/set-up within 7 day(s). 5.  Patient will perform all aspects of toileting with supervision/set-up within 7 day(s). 6.  Patient will participate in upper extremity therapeutic exercise/activities with supervision/set-up for 10 minutes within 7 day(s). 7.  Patient will utilize energy conservation techniques during functional activities with verbal and visual cues within 7 day(s). Outcome: Not Met     OCCUPATIONAL THERAPY EVALUATION  Patient: Sylwia Norton (77 y.o. female)  Date: 6/5/2021  Primary Diagnosis: CVA (cerebral vascular accident) [facility]) [I63.9]        Precautions: Fall    ASSESSMENT  Based on the objective data described below, the patient presents with decreased balance, strength, distal lower body access, cardiopulmonary endurance, memory, safety awareness, and proximal LUE strength s/p admission for HA and slurred speech, CVA workup revealing acute on chronic frontal infarct. Patient is a fair historian, reports she was Mod I with SPC use, mostly Mod I for ADLs with some setup from son prior to him leaving for work, utilizes bus system for transportation to/from medical appointments.  Hx of CVA with decreased LUE>LLE strength. She now presents below this baseline, requiring constant hands on assistance for ADLs and mobility with RW support, and min-mod cues for safety and coordination. SOB noted with brief toileting & grooming in bathroom, VSS on RA but increased time to recovery SOB with intermittent coughing. Considering her PLOF and current functional level requiring constant assist & SPV for all functional tasks, recommend d/c to IPR as she is below her baseline and not safe to be home alone at this time. Current Level of Function Impacting Discharge (ADLs/self-care): CGA-Min A for ADLs and mobility    Functional Outcome Measure: The patient scored 55/100 on the Barthel Index, indicating 45% impairment in ADLs and mobility    Other factors to consider for discharge: fall risk, alone during the daytime, PMH, PLOF     Patient will benefit from skilled therapy intervention to address the above noted impairments. PLAN :  Recommendations and Planned Interventions: self care training, functional mobility training, therapeutic exercise, balance training, therapeutic activities, cognitive retraining, endurance activities, patient education, home safety training, and family training/education    Frequency/Duration: Patient will be followed by occupational therapy 5 times a week to address goals. Recommendation for discharge: (in order for the patient to meet his/her long term goals)  Therapy 3 hours per day 5-7 days per week  If d/c home, would require constant SPV & assist for ADLs/IADLs and mobility    This discharge recommendation:  A follow-up discussion with the attending provider and/or case management is planned    IF patient discharges home will need the following DME: To be determined (TBD)         SUBJECTIVE:   Patient stated I even have to get myself some lunch during the day.     OBJECTIVE DATA SUMMARY:   HISTORY:   No past medical history on file. No past surgical history on file.  Expanded or extensive additional review of patient history:     Home Situation  Home Environment: Private residence  # Steps to Enter: 4  Rails to Enter: Yes  Hand Rails : Bilateral  One/Two Story Residence: One story  Living Alone: No  Support Systems: Family member(s), Friends \ neighbors  Patient Expects to be Discharged to[de-identified] [employer]  Current DME Used/Available at Home: [place], straight, Walker, rolling, Shower chair  Tub or Shower Type: Tub/Shower combination    Hand dominance: Right    EXAMINATION OF PERFORMANCE DEFICITS:  Cognitive/Behavioral Status:  Neurologic State: Alert  Orientation Level: Oriented X4  Cognition: Appropriate decision making; Appropriate for age attention/concentration;Poor safety awareness  Perception: Appears intact  Perseveration: No perseveration noted  Safety/Judgement: Awareness of environment;Decreased awareness of need for assistance;Decreased awareness of need for safety;Decreased insight into deficits    Skin: Appears intact    Edema: None    Hearing: Auditory  Auditory Impairment: None    Vision/Perceptual:    Tracking: Able to track stimulus in all quadrants w/o difficulty                 Diplopia: No    Acuity: Within Defined Limits         Range of Motion:  AROM: Within functional limits  PROM: Within functional limits                      Strength:  Strength: Generally decreased, functional (LUE decreased proximally--pt reports from old CVA)                Coordination:  Coordination: Within functional limits  Fine Motor Skills-Upper: Left Intact; Right Intact    Gross Motor Skills-Upper: Right Intact; Left Intact    Tone & Sensation:     Sensation: Impaired (L LE)                      Balance:  Sitting: Intact  Standing: Impaired; With support; Without support (RW vs none, incr A with no AD)  Standing - Static: Good  Standing - Dynamic : Fair;Constant support    Functional Mobility and Transfers for ADLs:  Bed Mobility:  Rolling: Modified independent;Bed Modified  Supine to Sit: Modified independent;Bed Modified  Scooting: Modified independent    Transfers:  Sit to Stand: Contact guard assistance  Stand to Sit: Contact guard assistance  Bed to Chair: Contact guard assistance  Bathroom Mobility: Contact guard assistance;Minimum assistance (incr A with no AD, CGA with RW)  Toilet Transfer : Contact guard assistance (mod cues for safety technique)  Assistive Device : Gait Belt;Walker, rolling    ADL Assessment:  Feeding: Setup    Oral Facial Hygiene/Grooming: Contact guard assistance    Bathing: Minimum assistance (infer including tub transfer)    Upper Body Dressing: Setup    Lower Body Dressing: Minimum assistance    Toileting: Minimum assistance                ADL Intervention and task modifications:       Grooming  Grooming Assistance: Contact guard assistance  Position Performed: Standing (at sink)  Washing Hands: Contact guard assistance (min cues for sequencing)  Cues: Tactile cues provided;Verbal cues provided                   Lower Body Dressing Assistance  Dressing Assistance: Minimum assistance  Protective Undergarmet: Minimum assistance (A for distal donning)  Leg Crossed Method Used: No  Position Performed: Bending forward method;Seated in chair;Standing  Cues: Physical assistance; Tactile cues provided;Verbal cues provided;Visual cues provided    Toileting  Toileting Assistance: Minimum assistance  Bladder Hygiene: Supervision  Bowel Hygiene: Supervision  Clothing Management: Minimum assistance  Cues: Physical assistance for pants up; Tactile cues provided;Verbal cues provided;Visual cues provided  Adaptive Equipment: Grab bars; Walker    Cognitive Retraining  Safety/Judgement: Awareness of environment;Decreased awareness of need for assistance;Decreased awareness of need for safety;Decreased insight into deficits    Functional Measure:  Barthel Index:    Bathin  Bladder: 10  Bowels: 10  Groomin  Dressin  Feeding: 10  Mobility: 0  Stairs: 0  Toilet Use: 5  Transfer (Bed to Chair and Back): 10  Total: 55/100        The Barthel ADL Index: Guidelines  1. The index should be used as a record of what a patient does, not as a record of what a patient could do. 2. The main aim is to establish degree of independence from any help, physical or verbal, however minor and for whatever reason. 3. The need for supervision renders the patient not independent. 4. A patient's performance should be established using the best available evidence. Asking the patient, friends/relatives and nurses are the usual sources, but direct observation and common sense are also important. However direct testing is not needed. 5. Usually the patient's performance over the preceding 24-48 hours is important, but occasionally longer periods will be relevant. 6. Middle categories imply that the patient supplies over 50 per cent of the effort. 7. Use of aids to be independent is allowed. Shelly Lira., Barthel, D.W. (3071). Functional evaluation: the Barthel Index. [address] [facility] (14)2. CECIL Duke, Herminia Luis., Ambar Fontana., [place], [address] [place] [address] Ave (1999). Measuring the change indisability after inpatient rehabilitation; comparison of the responsiveness of the Barthel Index and Functional [place] Measure. Journal of Neurology, Neurosurgery, and Psychiatry, 66(4), 826-113. Mirian Mott, N.J.A, JJ Joshua, & Purnima Chambers MSunnyA. (2004.) Assessment of post-stroke quality of life in cost-effectiveness studies: The usefulness of the Barthel Index and the EuroQoL-5D.  Quality of Life Research, 15, 124-73         Occupational Therapy Evaluation Charge Determination   History Examination Decision-Making   LOW Complexity : Brief history review  MEDIUM Complexity : 3-5 performance deficits relating to physical, cognitive , or psychosocial skils that result in activity limitations and / or participation restrictions LOW Complexity : No comorbidities that affect functional and no verbal or physical assistance needed to complete eval tasks       Based on the above components, the patient evaluation is determined to be of the following complexity level: LOW   Pain Rating:  None reported    Activity Tolerance:   Fair, requires rest breaks, and observed SOB with activity, intermittent coughing, O2 stable on RA    After treatment patient left in no apparent distress:    Sitting in chair, Call bell within reach, and Bed / chair alarm activated    COMMUNICATION/EDUCATION:   The patients plan of care was discussed with: Registered nurse and Physician. This patients plan of care is appropriate for delegation to [facility].     Thank you for this referral.  SORAYA Jones, OTR/L  Time Calculation: 30 mins

## 2021-06-05 NOTE — CONSULTS
Neurointerventional Surgery Chart & Imaging Review Note    I was called to review the imaging for patient Sb Wu for intracranial atherosclerotic disease. I reviewed the chart and imaging studies. The noncontrast head CT shows a chronic right frontal infarction and chronic deep white matter changes. CT Perfusion shows a matched defect in the area of the right frontal infarction. CTA shows no significant cervical carotid stenosis, mild to moderate stenosis of the right cavernous ICA, intracranial atherosclerotic disease involving bilateral MCA branches, and small left MCA bifurcation aneurysm. MRI shows acute on chronic right frontal infarction with associated chronic hemosiderin deposition. No target for neurointervention. Recommend medical management per Neurology. Agree with dual anti-platelet therapy and high intensity statin.

## 2021-06-05 NOTE — CONSULTS
INPATIENT NEUROLOGY CONSULTATION  6/5/2021     Consulted by: Meño Coronado MD        Patient ID:  Sb Wu  [id number]  77 y.o.  1945    Chief Complaint   Patient presents with    Dysarthria       HPI    Ms. Fabian Rees is a 52-year-old woman with a history of stroke and residual left hemiparesis who is here because of speech change and headache. Since arrival she does not feel any better. She is having a lot of body pain and joint pain. The left side of her body is aching. She does not feel her baseline. I do not have any records regarding the old stroke. Emergent imaging revealing intracranial atherosclerosis throughout bilaterally. She was placed on dual antiplatelets in the emergency room. Aspirin is responsive that Plavix is not. MRI brain was completed and the preliminary suggest possibly new ischemia. When I reviewed the imaging she has extensive ischemic disease bilaterally and may have some acute disease on top of chronic disease in the right anterior MCA territory. This morning I stopped Plavix and started Brilinta. Review of Systems   Unable to perform ROS: Medical condition   Musculoskeletal: Positive for myalgias. Neurological: Positive for speech change and focal weakness. All other systems reviewed and are negative. No past medical history on file. No family history on file.   Social History     Socioeconomic History    Marital status: SINGLE     Spouse name: Not on file    Number of children: Not on file    Years of education: Not on file    Highest education level: Not on file   Occupational History    Not on file   Tobacco Use    Smoking status: Not on file   Substance and Sexual Activity    Alcohol use: Not on file    Drug use: Not on file    Sexual activity: Not on file   Other Topics Concern    Not on file   Social History Narrative    Not on file     Social Determinants of Health     Financial Resource Strain:     Difficulty of Paying Living Expenses: Food Insecurity:     Worried About Running Out of Food in the Last Year:     [address] in the Last Year:    Transportation Needs:     Lack of Transportation (Medical):  Lack of Transportation (Non-Medical):    Physical Activity:     Days of Exercise per Week:     Minutes of Exercise per Session:    Stress:     Feeling of Stress :    Social Connections:     Frequency of Communication with Friends and Family:     Frequency of Social Gatherings with Friends and Family:     Attends Jew Services:     Active Member of Clubs or Organizations:     Attends Club or Organization Meetings:     Marital Status:    Intimate Partner Violence:     Fear of Current or Ex-Partner:     Emotionally Abused:     Physically Abused:     Sexually Abused:      Current Facility-Administered Medications   Medication Dose Route Frequency    ticagrelor (BRILINTA) tablet 30 mg  30 mg Oral Q12H    acetaminophen (TYLENOL) tablet 650 mg  650 mg Oral Q4H PRN    Or    acetaminophen (TYLENOL) solution 650 mg  650 mg Per NG tube Q4H PRN    Or    acetaminophen (TYLENOL) suppository 650 mg  650 mg Rectal Q4H PRN    0.9% sodium chloride infusion  75 mL/hr IntraVENous CONTINUOUS    aspirin chewable tablet 81 mg  81 mg Oral DAILY    atorvastatin (LIPITOR) tablet 80 mg  80 mg Oral QHS    famotidine (PF) (PEPCID) 20 mg in 0.9% sodium chloride 10 mL injection  20 mg IntraVENous Q24H     No Known Allergies    Visit Vitals  BP (!) 182/79   Pulse 85   Temp 97.7 °F (36.5 °C)   Resp 15   Ht 5' 4\" (1.626 m)   Wt 180 lb 6.4 oz (81.8 kg)   SpO2 94%   BMI 30.97 kg/m²     Physical Exam  Vitals and nursing note reviewed. Neurologic Exam     Mental Status   Elderly woman sitting upright in a chair. She is in pain. She notes that the left shoulder hurts the most.  Pupils are equal reactive but she seems to have a right gaze preference but I can redirect her to the midline easily.   She seems to have a right facial asymmetry and her speech is slurred but she has poor dentition. Tongue appears midline. She can repeat slowly. She can comprehend. Left upper extremity I would grade 4/5 left lower extremity 4+  Right upper and lower extremity 4+ with some giveaway secondary to pain throughout  Reduced sensation left leg perhaps around 20% compared to the right  No abnormal movements  No truncal ataxia while sitting  Gait deferred            Lab Results   Component Value Date/Time    WBC 6.9 06/05/2021 04:48 AM    HGB 9.5 (L) 06/05/2021 04:48 AM    HCT 30.9 (L) 06/05/2021 04:48 AM    PLATELET 993 53/80/6924 04:48 AM    MCV 82.4 06/05/2021 04:48 AM     Lab Results   Component Value Date/Time    Hemoglobin A1c 8.2 (H) 06/05/2021 04:48 AM    Glucose 213 (H) 06/04/2021 11:06 AM    LDL,Direct 82 06/05/2021 04:48 AM    LDL, calculated Not calculated due to elevated triglyceride level 06/05/2021 04:48 AM    Creatinine 1.45 (H) 06/04/2021 11:06 AM      Lab Results   Component Value Date/Time    Cholesterol, total 243 (H) 06/05/2021 04:48 AM    HDL Cholesterol 37 06/05/2021 04:48 AM    LDL,Direct 82 06/05/2021 04:48 AM    LDL, calculated Not calculated due to elevated triglyceride level 06/05/2021 04:48 AM    Triglyceride 534 (H) 06/05/2021 04:48 AM    CHOL/HDL Ratio 6.6 (H) 06/05/2021 04:48 AM     Lab Results   Component Value Date/Time    ALT (SGPT) 17 06/04/2021 11:06 AM    Alk. phosphatase 103 06/04/2021 11:06 AM    Bilirubin, total 0.3 06/04/2021 11:06 AM    Albumin 3.2 (L) 06/04/2021 11:06 AM    Protein, total 6.7 06/04/2021 11:06 AM    INR 1.1 06/04/2021 11:06 AM    Prothrombin time 11.0 06/04/2021 11:06 AM    PLATELET 139 67/93/5363 04:48 AM        CT Results (maximum last 3):   Results from Hospital Encounter encounter on 06/04/21    CT CODE NEURO PERF W CBF    Narrative  EXAM:  CTA CODE NEURO HEAD AND NECK W CONT, CT CODE NEURO PERF W CBF    INDICATION:  Slurred Speech    COMPARISON:  Noncontrast head CT of 6/4/2021    TECHNIQUE:  Multislice helical axial CT angiography was performed from the aortic arch to  the top of the head during uneventful rapid bolus intravenous administration of  120 mL of Isovue 370. Postprocessing was performed to include MIP and  reformatted images. Standard images of the head were also obtained following  contrast administration and a delay. CT brain perfusion was performed with generation of hemodynamic maps of multiple  parameters, including cerebral blood flow, cerebral volume, Tmax and MTT (mean  transit time). This study was analyzed by the [address] ai algorithm. CT dose reduction was achieved through the use of a standardized protocol  tailored for this examination and automatic exposure control for dose  modulation. FINDINGS:    CT PERFUSION:  There are no regional areas of elevated Tmax, decreased cerebral blood flow or  blood volume as calculated by the AI algorithm. . There is matched reduced  perfusion in the location of the chronic right frontal infarct. rCBF < 30% = 0 cc. Tmax > 6 seconds = 0 cc. Head CT:  The ventricles are normal in size and midline. .  There is no intracranial  hemorrhage or evolving infarct. . A chronic right frontal lobe infarct is again  noted. There is hypodensity of the cerebral white matter, consistent with  chronic small vessel ischemic change. There is no abnormal enhancement. . .    CTA NECK:    Great vessels: Normal arch anatomy with the origins patent. Right subclavian artery: Patent    Left subclavian artery: Patent    Right common carotid artery: Patent    Left common carotid artery: Patent    Cervical right internal carotid artery: Patent with no significant stenosis by  NASCET criteria. Tortuosity of the proximal to mid internal carotid artery, with  a retropharyngeal location. Cervical left internal carotid artery: Patent with moderate (40-60%) stenosis by  NASCET criteria.   Tortuosity of the proximal to mid internal carotid artery, with a  retropharyngeal location. Right vertebral artery: Patent. Focal calcified plaque at the origin with  moderate stenosis. Left vertebral artery: Patent. Focal calcified plaque at the origin with  moderate stenosis. Lung apices clear. No suspicious thyroid nodule. Subcentimeter cystic-appearing  left thyroid nodule. Enlarged main pulmonary artery suggesting pulmonary  arterial hypertension. Cervical spine degenerative changes. CTA HEAD:    Right cavernous internal carotid artery: Patent. Diffuse calcified plaque with  moderate to severe stenosis. Left cavernous internal carotid artery: Patent. Diffuse calcified plaque with  moderate to severe stenosis. Anterior cerebral arteries: Patent    Anterior communicating artery: Patent    Right middle cerebral artery: Patent. Irregularity, consistent with  atherosclerotic disease without flow-limiting stenosis. Left middle cerebral artery: Patent. Irregularity, consistent with  atherosclerotic disease, without flow-limiting stenosis. Posterior cerebral arteries: Patent    Posterior communicating arteries: Patent    Basilar artery: Patent    Distal vertebral arteries: Patent    Major venous sinus and deep venous system: Patent. Aneurysm: None. Impression  1. Small matched perfusion defect in the location of the patient's chronic right  frontal infarct. Otherwise negative CT perfusion study, without perfusion  mismatch. 2. No acute large vessel occlusion. 3. Moderate left internal carotid artery origin stenosis. Moderate to severe  bilateral cavernous carotid stenoses. Irregularity of the middle cerebral  arteries bilaterally without focal flow-limiting stenosis.       CTA CODE NEURO HEAD AND NECK W CONT    Narrative  EXAM:  CTA CODE NEURO HEAD AND NECK W CONT, CT CODE NEURO PERF W CBF    INDICATION:  Slurred Speech    COMPARISON:  Noncontrast head CT of 6/4/2021    TECHNIQUE:  Multislice helical axial CT angiography was performed from the aortic arch to  the top of the head during uneventful rapid bolus intravenous administration of  120 mL of Isovue 370. Postprocessing was performed to include MIP and  reformatted images. Standard images of the head were also obtained following  contrast administration and a delay. CT brain perfusion was performed with generation of hemodynamic maps of multiple  parameters, including cerebral blood flow, cerebral volume, Tmax and MTT (mean  transit time). This study was analyzed by the [address] ai algorithm. CT dose reduction was achieved through the use of a standardized protocol  tailored for this examination and automatic exposure control for dose  modulation. FINDINGS:    CT PERFUSION:  There are no regional areas of elevated Tmax, decreased cerebral blood flow or  blood volume as calculated by the AI algorithm. . There is matched reduced  perfusion in the location of the chronic right frontal infarct. rCBF < 30% = 0 cc. Tmax > 6 seconds = 0 cc. Head CT:  The ventricles are normal in size and midline. .  There is no intracranial  hemorrhage or evolving infarct. . A chronic right frontal lobe infarct is again  noted. There is hypodensity of the cerebral white matter, consistent with  chronic small vessel ischemic change. There is no abnormal enhancement. . .    CTA NECK:    Great vessels: Normal arch anatomy with the origins patent. Right subclavian artery: Patent    Left subclavian artery: Patent    Right common carotid artery: Patent    Left common carotid artery: Patent    Cervical right internal carotid artery: Patent with no significant stenosis by  NASCET criteria. Tortuosity of the proximal to mid internal carotid artery, with  a retropharyngeal location. Cervical left internal carotid artery: Patent with moderate (40-60%) stenosis by  NASCET criteria. Tortuosity of the proximal to mid internal carotid artery, with a  retropharyngeal location. Right vertebral artery: Patent.  Focal calcified plaque at the origin with  moderate stenosis. Left vertebral artery: Patent. Focal calcified plaque at the origin with  moderate stenosis. Lung apices clear. No suspicious thyroid nodule. Subcentimeter cystic-appearing  left thyroid nodule. Enlarged main pulmonary artery suggesting pulmonary  arterial hypertension. Cervical spine degenerative changes. CTA HEAD:    Right cavernous internal carotid artery: Patent. Diffuse calcified plaque with  moderate to severe stenosis. Left cavernous internal carotid artery: Patent. Diffuse calcified plaque with  moderate to severe stenosis. Anterior cerebral arteries: Patent    Anterior communicating artery: Patent    Right middle cerebral artery: Patent. Irregularity, consistent with  atherosclerotic disease without flow-limiting stenosis. Left middle cerebral artery: Patent. Irregularity, consistent with  atherosclerotic disease, without flow-limiting stenosis. Posterior cerebral arteries: Patent    Posterior communicating arteries: Patent    Basilar artery: Patent    Distal vertebral arteries: Patent    Major venous sinus and deep venous system: Patent. Aneurysm: None. Impression  1. Small matched perfusion defect in the location of the patient's chronic right  frontal infarct. Otherwise negative CT perfusion study, without perfusion  mismatch. 2. No acute large vessel occlusion. 3. Moderate left internal carotid artery origin stenosis. Moderate to severe  bilateral cavernous carotid stenoses. Irregularity of the middle cerebral  arteries bilaterally without focal flow-limiting stenosis. MRI Results (maximum last 3): Results from [place] encounter on 06/04/21    MRI BRAIN WO CONT    Narrative  PRELIMINARY REPORT:    Encephalomalacia right frontal lobe. Areas of hemosiderin deposition compatible  with chronic hemorrhage. An acute hemorrhage is not identified.  There are linear  areas of restricted diffusion demonstrating decreased signal on ADC concerning  for acute on chronic infarct. Extensive white matter changes with punctate hemosiderin deposition in the basal  ganglia    Preliminary report was provided by Dr. Casper Prajapati, the on-call radiologist, at 2104    Final report to follow. VAS/US/Carotid Doppler Results (maximum last 3): No results found for this or any previous visit. PET Results (maximum last 3): No results found for this or any previous visit. Assessment and Plan        67-year-old woman with history of stroke who has intracranial atherosclerosis throughout. Her exam is difficult to determine what is acute or chronic. I suspect she may have had acute injury superimposed on chronic ischemia. Awaiting final MRI results. Check a platelet assay today to ensure Brilinta is effective. NIS consult for the diffuse intracranial disease. Stay on low-dose aspirin. Stay on high-dose statin. She needs an echo. Stay on telemetry. PT OT assessments. I will order some low-dose baclofen for pain as I suspect she has some chronic spasticity due to the old infarct. Following     During this evaluation, we also discussed stroke education to include signs and symptoms of stroke and TIA. This clinical note was dictated with an electronic dictation software that can make unintentional errors. If there are any questions, please contact me directly for clarification.       [address] [facility],   NEUROLOGIST  Diplomate JOLENE  6/5/2021

## 2021-06-05 NOTE — PROGRESS NOTES
[address] [facility]  Hospitalist Group                                                                                          Hospitalist Progress Note  Michael Jaime MD  Answering service: [phone] from in house phone        Date of Service:  2021  NAME:  Shira Abad  :  1945  MRN:  [id number]      Admission Summary:   Shira Abad is a 77 y.o. female who presents with dysarthria     History is primary obtained from the patient     Patient reports that she went to bed at baseline health at around 10:00 last night. Woke up this morning and had a headache associated with slurred speech. Patient reports that she has had a stroke in the past and has left-sided deficit. Patient came to the ER as a code stroke, patient currently reports that all her symptoms have resolved. Patient denies any other complaints or problems. Patient was requested to be admitted to the hospitalist service. Patient denies any recent falls or injuries. Interval history / Subjective: Follow up TIA, headache with slurred speech. Patient seen and examined at the bedside. Labs, images and notes reviewed  Discussed with nursing staff, orders reviewed. Plan discussed with patient/Family  Patient is feeling well. Doing okay. Has cough and right-sided nasal stuffiness. Cough is going on for 2-3 weeks. No sputum. Denied any fever or chills. Denied any shortness of breath or wheezing. Denied any focal weakness, tingling, numbness. No headache. Assessment & Plan:     #Headache with slurred speech, resolved. Likely TIA VS CVA  #Subacute cough without any sputum,  #Right nasal stuffiness  #DM2 with hyperglycemia. A1c 8.2  #CKD 3  #Elevated blood pressure  #Anemia, acute/chronic. No baseline. #Mild thrombocytopenia  #Hypertriglyceridemia, triglycerides 534. Total cholesterol 243    -Admitted to inpatient, neuro telemetry  -Neurology on board, appreciate recommendations.   -NIS on board, appreciate recommendations. Medical management  -Dual antiplatelet regimen with aspirin and Brilinta per neurology  -Follow MRI brain without contrast  -Follow echocardiogram  -PT/OT/ST  -Add amlodipine 5 mg daily for better blood pressure control. 24-hour permissive hypertension completed  -CXR for cough  -Add pantoprazole 40 mg twice daily for possible GERD as a differential of cough  -Mucinex 600 mg twice daily  -Tessalon Perles as needed  -Atorvastatin 80 mg daily at bedtime  -Add breathing treatments: Scheduled albuterol as well as PRN for excessive cough  -Humalog sliding scale insulin with serial Accu-Cheks    Code status: Full code  DVT prophylaxis: SCD    Care Plan discussed with: Patient/Family and Nurse  Anticipated Disposition: [facility]/Rehab  Anticipated Discharge: Greater than 48 hours     Hospital Problems  Never Reviewed        Codes Class Noted POA    CVA (cerebral vascular accident) ([facility] [address] ICD-10-CM: I63.9  ICD-9-CM: 434.91  6/4/2021 Unknown        Intracranial atherosclerosis ICD-10-CM: V56.2  ICD-9-CM: 437.0  6/4/2021 Yes        Stenosis of right carotid artery ICD-10-CM: I65.21  ICD-9-CM: 433.10  6/4/2021 Yes                Review of Systems:   A comprehensive review of systems was negative except for that written in the HPI. Vital Signs:    Last 24hrs VS reviewed since prior progress note.  Most recent are:  Visit Vitals  BP (!) 181/87   Pulse 80   Temp 98 °F (36.7 °C)   Resp 18   Ht 5' 4\" (1.626 m)   Wt 81.8 kg (180 lb 6.4 oz)   SpO2 98%   BMI 30.97 kg/m²       No intake or output data in the 24 hours ending 06/05/21 [id number]     Physical Examination:     I had a face to face encounter with this patient and independently examined them on 6/5/2021 as outlined below:          General : alert x 3, awake, no acute distress, resting in bed, pleasant /female, appears to be stated age  HEENT: PEERL, EOMI, moist mucus membrane, TM clear  Neck: supple,   Chest: Clear to auscultation bilaterally except mild posterior basal expiratory wheezing intermittently. Intermittent cough-wet sounding  CVS: S1 S2 heard,   Abd: soft/ Non tender, non distended, BS physiological,   Ext: no clubbing, no cyanosis,   Neuro/Psych: pleasant mood and affect, CN 2-12 grossly intact, sensory grossly within normal limit, Strength 4/5 in LUE and LL, 5/5 right upper and right lower extremity weakness, DTR 1+ x 4  Skin: warm          Data Review:    Review and/or order of clinical lab test  Review and/or order of tests in the radiology section of [facility]  Review and/or order of tests in the medicine section of [facility]    Radiology:  XR CHEST PORT    Result Date: 6/5/2021  No acute process. CTA CODE NEURO HEAD AND NECK W CONT    Result Date: 6/4/2021  1. Small matched perfusion defect in the location of the patient's chronic right frontal infarct. Otherwise negative CT perfusion study, without perfusion mismatch. 2. No acute large vessel occlusion. 3. Moderate left internal carotid artery origin stenosis. Moderate to severe bilateral cavernous carotid stenoses. Irregularity of the middle cerebral arteries bilaterally without focal flow-limiting stenosis. CT CODE NEURO HEAD WO CONTRAST    Result Date: 6/4/2021  Old right frontal infarct and significant small vessel ischemic changes. No acute abnormality. CT CODE NEURO PERF W CBF    Result Date: 6/4/2021  1. Small matched perfusion defect in the location of the patient's chronic right frontal infarct. Otherwise negative CT perfusion study, without perfusion mismatch. 2. No acute large vessel occlusion. 3. Moderate left internal carotid artery origin stenosis. Moderate to severe bilateral cavernous carotid stenoses. Irregularity of the middle cerebral arteries bilaterally without focal flow-limiting stenosis.       Labs:     Recent Labs     06/05/21  0448 06/04/21  1106   WBC 6.9 5.6   HGB 9.5* 8.4*   HCT 30.9* 28.0*    130*     Recent Labs     06/04/21  1106   *   K 4.4   CL 107   CO2 22   BUN 24*   CREA 1.45*   *   CA 8.9     Recent Labs     06/04/21  1106   ALT 17      TBILI 0.3   TP 6.7   ALB 3.2*   GLOB 3.5     Recent Labs     06/04/21  1106   INR 1.1   PTP 11.0      Recent Labs     06/04/21  1803   TIBC 226*   PSAT 21      No results found for: FOL, RBCF   No results for input(s): PH, PCO2, PO2 in the last 72 hours.   Recent Labs     06/05/21  0448 06/04/21  1803 06/04/21  1106   * 255*  --    TROIQ <0.05 <0.05 <0.05     Lab Results   Component Value Date/Time    Cholesterol, total 243 (H) 06/05/2021 04:48 AM    HDL Cholesterol 37 06/05/2021 04:48 AM    LDL,Direct 82 06/05/2021 04:48 AM    LDL, calculated Not calculated due to elevated triglyceride level 06/05/2021 04:48 AM    Triglyceride 534 (H) 06/05/2021 04:48 AM    CHOL/HDL Ratio 6.6 (H) 06/05/2021 04:48 AM     No results found for: GLUCPOC  No results found for: COLOR, APPRN, SPGRU, REFSG, MARCELO, PROTU, GLUCU, KETU, BILU, UROU, DYLON, LEUKU, GLUKE, EPSU, BACTU, WBCU, RBCU, CASTS, UCRY      Medications Reviewed:     Current Facility-Administered Medications   Medication Dose Route Frequency    ticagrelor (BRILINTA) tablet 30 mg  30 mg Oral Q12H    baclofen (LIORESAL) tablet 2.5 mg  2.5 mg Oral TID    fluticasone propionate (FLONASE) 50 mcg/actuation nasal spray 2 Spray  2 Spray Both Nostrils DAILY    albuterol (PROVENTIL VENTOLIN) nebulizer solution 2.5 mg  2.5 mg Nebulization QID RT    albuterol (PROVENTIL VENTOLIN) nebulizer solution 2.5 mg  2.5 mg Nebulization Q4H PRN    amLODIPine (NORVASC) tablet 5 mg  5 mg Oral DAILY    acetaminophen (TYLENOL) tablet 650 mg  650 mg Oral Q4H PRN    Or    acetaminophen (TYLENOL) solution 650 mg  650 mg Per NG tube Q4H PRN    Or    acetaminophen (TYLENOL) suppository 650 mg  650 mg Rectal Q4H PRN    aspirin chewable tablet 81 mg  81 mg Oral DAILY    atorvastatin (LIPITOR) tablet 80 mg  80 mg Oral QHS    famotidine (PF) (PEPCID) 20 mg in 0.9% sodium chloride 10 mL injection  20 mg IntraVENous Q24H     ______________________________________________________________________  EXPECTED LENGTH OF STAY: - - -  ACTUAL LENGTH OF STAY:          1                 Henrietta Haley MD

## 2021-06-05 NOTE — CONSULTS
Neurointerventional Surgery Consult  [facility]  Neurocritical Care NP      Patient: Amish Arambula MRN: [id number]  SSN: [id number]    YOB: 1945  Age: 77 y.o. Sex: female        Chief Complaint: slurred speech     Subjective: Amish Arambula is a 77 y.o. female with a PMH significant for Diabetes, hypertension, hypercholesterolemia, and remote stroke with residual left-sided weakness who initially presented to the ED via EMS as a code stroke on 6/4/2021 with complaints of slurred speech that started Friday morning per patient. She reportedly also experienced a headache, but currently denies any headache. She denies any vision changes, chest pain, vomiting, dizziness, or coordination issues. She does endorse some intermittent nausea since being in the hospital, slurred speech, balance issues, residual weakness on the left side, and numbness/tingling in bilateral feet. She also reports some SOB and abdominal pain/discomfort since yesterday. She also complains of an intermittent non-productive cough for a month now. She denies any fever. She reports she stays cold all the time. CT of the head showed an old right frontal infarct and significant small vessel ischemic changes. No acute abnormality. CTA of the head and neck showed no acute large vessel occlusion. Moderate left internal carotid artery origin stenosis. Moderate to severe bilateral cavernous carotid stenoses. Irregularity of the middle cerebral arteries bilaterally without focal flow-limiting stenosis per radiology report. Intracranial atherosclerosis noted. CT Perfusion showed a small matched perfusion defect in the location of the patient's chronic right frontal infarct, otherwise perfusion study was negative. MRI of the Brain was completed and preliminary report is showing encephalomalacia of right frontal lobe. Areas of hemosiderin deposition compatible with chronic hemorrhage. An acute hemorrhage is not identified. There are linear areas of restricted diffusion demonstrating decreased signal on ADC concerning for acute on chronic infarct. Extensive white matter changes with punctate hemosiderin deposition in the basal ganglia. Neurology was consulted for stroke evaluation. The patient reports she takes aspirin at home. She was started on Aspirin and Plavix during admission. She is therapeutic on Aspirin, but nontherapeutic on Plavix, therefore, she was switched from Plavix to Brilinta today by Neurology. NIS was consulted today due to abnormal CTA imaging.      PMH: as stated above   Past Surgical History: No pertinent surgical history  Family History: significant family history of mother with hx of lung cancer (mother is )    Social History     Tobacco Use    Smoking status: Denies any smoking history    Substance Use Topics    Alcohol use: None     Denies any illicit drug use     Current Facility-Administered Medications   Medication Dose Route Frequency Provider Last Rate Last Admin    ticagrelor (BRILINTA) tablet 30 mg  30 mg Oral Q12H Torin Villarreal DO   30 mg at 21 0830    baclofen (LIORESAL) tablet 2.5 mg  2.5 mg Oral TID Torin Villarreal DO   2.5 mg at 21 1044    acetaminophen (TYLENOL) tablet 650 mg  650 mg Oral Q4H PRN Ramesh Bethea MD   650 mg at 21 0534    Or    acetaminophen (TYLENOL) solution 650 mg  650 mg Per NG tube Q4H PRN Ramesh Bethea MD        Or   Sony Galla acetaminophen (TYLENOL) suppository 650 mg  650 mg Rectal Q4H PRN Ramesh Bethea MD        0.9% sodium chloride infusion  75 mL/hr IntraVENous CONTINUOUS Ramesh Bethea MD 75 mL/hr at 21 193 75 mL/hr at 21 193    aspirin chewable tablet 81 mg  81 mg Oral DAILY Ramesh Bethea MD   81 mg at 21 0831    atorvastatin (LIPITOR) tablet 80 mg  80 mg Oral QHS Ramesh Bethea MD   80 mg at 21    famotidine (PF) (PEPCID) 20 mg in 0.9% sodium chloride 10 mL injection  20 mg IntraVENous Q24H Fei Leslie MD   20 mg at 06/04/21 1854        No Known Allergies    Review of Systems:  Pertinent items are noted in the History of Present Illness. Objective:     Vitals:    06/05/21 0850 06/05/21 0857 06/05/21 0915 06/05/21 1000   BP: (!) 171/86 (!) 141/85 (!) 166/77 (!) 155/75   Pulse:    78   Resp:    13   Temp:    97.7 °F (36.5 °C)   SpO2:    98%   Weight:       Height:            Physical Exam:  GENERAL: alert, cooperative, no distress, appears stated age  EYE: conjunctivae/corneas clear. PERRL, EOM's intact. LUNG: clear to auscultation bilaterally  HEART: regular rate and rhythm, S1, S2 normal, no murmur, click, rub or gallop  EXTREMITIES:  extremities normal, atraumatic, no cyanosis or edema  SKIN: Appropriate for ethnicity. Warm to touch. Neurologic Exam:  Mental Status:  Alert and oriented x 4. Appropriate affect, mood and behavior. Language:    Normal fluency, repetition, comprehension and naming. Cranial Nerves:        Pupils equal, round and reactive to light. Visual fields full to confrontation. Extraocular movements intact. Facial sensation intact. Mild right facial asymmetry (appears to be related to poor dentition, missing teeth on right uppers). Hearing grossly intact bilaterally. No dysarthria. Tongue protrudes slightly to the left initially, but able to track to midline, palate elevates symmetrically. Motor:    No pronator drift. Bulk and tone normal.      5/5 strength in RUE and LUE. Mild weakness in LLE 4+/5 strength. RLE 5/5 strength     No involuntary movements. Sensation:    Sensation intact throughout to light touch. No neglect. Coordination & Gait: Slow to perform FTN and HTS bilaterally, but no significant ataxia noted. Gait deferred.      NIHSS:      1a-LOC:0    1b-Month/Age:0    1c-Open/Close Hand:0    2-Best Gaze:0    3-Visual Fields:0    4-Facial Palsy:1    5a-Left Arm:0    5b-Right Arm:0    6a-Left Le    6b-Right Le    7-Limb Ataxia:0    8-Sensory:0    9-Best Language:0    10-Dysarthria:0    11-Extinction/Inattention:0  TOTAL SCORE:1    Recent Results (from the past 24 hour(s))   IRON PROFILE    Collection Time: 21  6:03 PM   Result Value Ref Range    Iron 48 35 - 150 ug/dL    TIBC 226 (L) 250 - 450 ug/dL    Iron % saturation 21 20 - 50 %   TROPONIN I    Collection Time: 21  6:03 PM   Result Value Ref Range    Troponin-I, Qt. <0.05 <0.05 ng/mL   CK    Collection Time: 21  6:03 PM   Result Value Ref Range     (H) 26 - 192 U/L   ASPIRIN TEST    Collection Time: 21  6:26 PM   Result Value Ref Range    Aspirin test 393 ARU   PLATELET FUNCTION, VERIFY NOW P2Y12    Collection Time: 21  6:26 PM   Result Value Ref Range    P2Y12 Plt response 280 194 - 418 PRU   LIPID PANEL    Collection Time: 21  4:48 AM   Result Value Ref Range    Cholesterol, total 243 (H) <200 MG/DL    Triglyceride 534 (H) <150 MG/DL    HDL Cholesterol 37 MG/DL    LDL, calculated Not calculated due to elevated triglyceride level 0 - 100 MG/DL    VLDL, calculated  MG/DL     Calculation not valid with this patient's other Lipid values.     CHOL/HDL Ratio 6.6 (H) 0.0 - 5.0     HEMOGLOBIN A1C WITH EAG    Collection Time: 21  4:48 AM   Result Value Ref Range    Hemoglobin A1c 8.2 (H) 4.0 - 5.6 %    Est. average glucose 189 mg/dL   CBC W/O DIFF    Collection Time: 21  4:48 AM   Result Value Ref Range    WBC 6.9 3.6 - 11.0 K/uL    RBC 3.75 (L) 3.80 - 5.20 M/uL    HGB 9.5 (L) 11.5 - 16.0 g/dL    HCT 30.9 (L) 35.0 - 47.0 %    MCV 82.4 80.0 - 99.0 FL    MCH 25.3 (L) 26.0 - 34.0 PG    MCHC 30.7 30.0 - 36.5 g/dL    RDW 15.4 (H) 11.5 - 14.5 %    PLATELET 751 021 - 397 K/uL    MPV 12.6 8.9 - 12.9 FL    NRBC 0.0 0  WBC    ABSOLUTE NRBC 0.00 0.00 - 0.01 K/uL   CRP, HIGH SENSITIVITY    Collection Time: 21  4:48 AM   Result Value Ref Range    CRP, High sensitivity 5.1 mg/L   SED RATE (ESR) Collection Time: 06/05/21  4:48 AM   Result Value Ref Range    Sed rate, automated 46 (H) 0 - 30 mm/hr   TSH 3RD GENERATION    Collection Time: 06/05/21  4:48 AM   Result Value Ref Range    TSH 1.38 0.36 - 3.74 uIU/mL   TROPONIN I    Collection Time: 06/05/21  4:48 AM   Result Value Ref Range    Troponin-I, Qt. <0.05 <0.05 ng/mL   CK    Collection Time: 06/05/21  4:48 AM   Result Value Ref Range     (H) 26 - 192 U/L   LDL, DIRECT    Collection Time: 06/05/21  4:48 AM   Result Value Ref Range    LDL,Direct 82 0 - 100 mg/dl       Imaging:  CT Results  (Last 48 hours)               06/04/21 1104  CTA CODE NEURO HEAD AND NECK W CONT Final result    Impression:      1. Small matched perfusion defect in the location of the patient's chronic right   frontal infarct. Otherwise negative CT perfusion study, without perfusion   mismatch. 2. No acute large vessel occlusion. 3. Moderate left internal carotid artery origin stenosis. Moderate to severe   bilateral cavernous carotid stenoses. Irregularity of the middle cerebral   arteries bilaterally without focal flow-limiting stenosis. Narrative:  EXAM:  CTA CODE NEURO HEAD AND NECK W CONT, CT CODE NEURO PERF W CBF       INDICATION:  Slurred Speech       COMPARISON:  Noncontrast head CT of 6/4/2021       TECHNIQUE:     Multislice helical axial CT angiography was performed from the aortic arch to   the top of the head during uneventful rapid bolus intravenous administration of   120 mL of Isovue 370. Postprocessing was performed to include MIP and   reformatted images. Standard images of the head were also obtained following   contrast administration and a delay. CT brain perfusion was performed with generation of hemodynamic maps of multiple   parameters, including cerebral blood flow, cerebral volume, Tmax and MTT (mean   transit time). This study was analyzed by the [address] ai algorithm.     CT dose reduction was achieved through the use of a standardized protocol   tailored for this examination and automatic exposure control for dose   modulation. FINDINGS:       CT PERFUSION:   There are no regional areas of elevated Tmax, decreased cerebral blood flow or   blood volume as calculated by the AI algorithm. . There is matched reduced   perfusion in the location of the chronic right frontal infarct. rCBF < 30% = 0 cc. Tmax > 6 seconds = 0 cc. Head CT:   The ventricles are normal in size and midline. .  There is no intracranial   hemorrhage or evolving infarct. . A chronic right frontal lobe infarct is again   noted. There is hypodensity of the cerebral white matter, consistent with   chronic small vessel ischemic change. There is no abnormal enhancement. . .       CTA NECK:       Great vessels: Normal arch anatomy with the origins patent. Right subclavian artery: Patent       Left subclavian artery: Patent        Right common carotid artery: Patent        Left common carotid artery: Patent        Cervical right internal carotid artery: Patent with no significant stenosis by   NASCET criteria. Tortuosity of the proximal to mid internal carotid artery, with   a retropharyngeal location. Cervical left internal carotid artery: Patent with moderate (40-60%) stenosis by   NASCET criteria. Tortuosity of the proximal to mid internal carotid artery, with a   retropharyngeal location. Right vertebral artery: Patent. Focal calcified plaque at the origin with   moderate stenosis. Left vertebral artery: Patent. Focal calcified plaque at the origin with   moderate stenosis. Lung apices clear. No suspicious thyroid nodule. Subcentimeter cystic-appearing   left thyroid nodule. Enlarged main pulmonary artery suggesting pulmonary   arterial hypertension. Cervical spine degenerative changes. CTA HEAD:       Right cavernous internal carotid artery: Patent. Diffuse calcified plaque with   moderate to severe stenosis.        Left cavernous internal carotid artery: Patent. Diffuse calcified plaque with   moderate to severe stenosis. Anterior cerebral arteries: Patent       Anterior communicating artery: Patent       Right middle cerebral artery: Patent. Irregularity, consistent with   atherosclerotic disease without flow-limiting stenosis. Left middle cerebral artery: Patent. Irregularity, consistent with   atherosclerotic disease, without flow-limiting stenosis. Posterior cerebral arteries: Patent       Posterior communicating arteries: Patent       Basilar artery: Patent       Distal vertebral arteries: Patent       Major venous sinus and deep venous system: Patent. Aneurysm: None. 06/04/21 1102  CT CODE NEURO PERF W CBF Final result    Impression:      1. Small matched perfusion defect in the location of the patient's chronic right   frontal infarct. Otherwise negative CT perfusion study, without perfusion   mismatch. 2. No acute large vessel occlusion. 3. Moderate left internal carotid artery origin stenosis. Moderate to severe   bilateral cavernous carotid stenoses. Irregularity of the middle cerebral   arteries bilaterally without focal flow-limiting stenosis. Narrative:  EXAM:  CTA CODE NEURO HEAD AND NECK W CONT, CT CODE NEURO PERF W CBF       INDICATION:  Slurred Speech       COMPARISON:  Noncontrast head CT of 6/4/2021       TECHNIQUE:     Multislice helical axial CT angiography was performed from the aortic arch to   the top of the head during uneventful rapid bolus intravenous administration of   120 mL of Isovue 370. Postprocessing was performed to include MIP and   reformatted images. Standard images of the head were also obtained following   contrast administration and a delay. CT brain perfusion was performed with generation of hemodynamic maps of multiple   parameters, including cerebral blood flow, cerebral volume, Tmax and MTT (mean   transit time).  This study was analyzed by the [employer] algorithm. CT dose reduction was achieved through the use of a standardized protocol   tailored for this examination and automatic exposure control for dose   modulation. FINDINGS:       CT PERFUSION:   There are no regional areas of elevated Tmax, decreased cerebral blood flow or   blood volume as calculated by the AI algorithm. . There is matched reduced   perfusion in the location of the chronic right frontal infarct. rCBF < 30% = 0 cc. Tmax > 6 seconds = 0 cc. Head CT:   The ventricles are normal in size and midline. .  There is no intracranial   hemorrhage or evolving infarct. . A chronic right frontal lobe infarct is again   noted. There is hypodensity of the cerebral white matter, consistent with   chronic small vessel ischemic change. There is no abnormal enhancement. . .       CTA NECK:       Great vessels: Normal arch anatomy with the origins patent. Right subclavian artery: Patent       Left subclavian artery: Patent        Right common carotid artery: Patent        Left common carotid artery: Patent        Cervical right internal carotid artery: Patent with no significant stenosis by   NASCET criteria. Tortuosity of the proximal to mid internal carotid artery, with   a retropharyngeal location. Cervical left internal carotid artery: Patent with moderate (40-60%) stenosis by   NASCET criteria. Tortuosity of the proximal to mid internal carotid artery, with a   retropharyngeal location. Right vertebral artery: Patent. Focal calcified plaque at the origin with   moderate stenosis. Left vertebral artery: Patent. Focal calcified plaque at the origin with   moderate stenosis. Lung apices clear. No suspicious thyroid nodule. Subcentimeter cystic-appearing   left thyroid nodule. Enlarged main pulmonary artery suggesting pulmonary   arterial hypertension. Cervical spine degenerative changes.        CTA HEAD:       Right cavernous internal carotid artery: Patent. Diffuse calcified plaque with   moderate to severe stenosis. Left cavernous internal carotid artery: Patent. Diffuse calcified plaque with   moderate to severe stenosis. Anterior cerebral arteries: Patent       Anterior communicating artery: Patent       Right middle cerebral artery: Patent. Irregularity, consistent with   atherosclerotic disease without flow-limiting stenosis. Left middle cerebral artery: Patent. Irregularity, consistent with   atherosclerotic disease, without flow-limiting stenosis. Posterior cerebral arteries: Patent       Posterior communicating arteries: Patent       Basilar artery: Patent       Distal vertebral arteries: Patent       Major venous sinus and deep venous system: Patent. Aneurysm: None. 06/04/21 1048  CT CODE NEURO HEAD WO CONTRAST Final result    Impression: Old right frontal infarct and significant small vessel ischemic changes. No   acute abnormality. Narrative:  EXAM: CT CODE NEURO HEAD WO CONTRAST       INDICATION: Slurred Speech, headache       COMPARISON: None. CONTRAST: None. TECHNIQUE: Unenhanced CT of the head was performed using 5 mm images. Brain and   bone windows were generated. Coronal and sagittal reformats. CT dose reduction   was achieved through use of a standardized protocol tailored for this   examination and automatic exposure control for dose modulation. FINDINGS:   The ventricles and sulci are normal in size, shape and configuration. . Old right   frontal infarct is noted. Significant small vessel ischemic changes are seen in   the periventricular white matter. There is no intracranial hemorrhage,   extra-axial collection, or mass effect. The basilar cisterns are open. No CT   evidence of acute infarct. The bone windows demonstrate no abnormalities. The visualized portions of the   paranasal sinuses and mastoid air cells are clear. Vascular calcification is   noted. MRI of Brain on 6/4/2021 at 2051 preliminary report shows  PRELIMINARY REPORT:     Encephalomalacia right frontal lobe. Areas of hemosiderin deposition compatible  with chronic hemorrhage. An acute hemorrhage is not identified. There are linear  areas of restricted diffusion demonstrating decreased signal on ADC concerning  for acute on chronic infarct.     Extensive white matter changes with punctate hemosiderin deposition in the basal  ganglia     Preliminary report was provided by Dr. Madiha Franco, the on-call radiologist, at 2104     Final report to follow.     Assessment:     Hospital Problems  Never Reviewed        Codes Class Noted POA    CVA (cerebral vascular accident) [facility]) ICD-10-CM: I63.9  ICD-9-CM: 434.91  6/4/2021 Unknown            Plan:   Acute on chronic right frontal Infarction with associated chronic hemosiderin deposition  - as seen on MRI of Brain  - Hgb A1C 8.2, management per primary team  - lipid panel shows LDL 82, triglyceride 534 and total cholesterol 243, continue Lipitor 80 mg QHS  - ECHO pending  - PT/OT/SLP evals  - started on DAPT, on Aspirin 81 mg daily and switched from Plavix to Brilinta today as patient was not therapeutic with Plavix per Neurology.  Aspirin test therapeutic at 393  - Neurology following     Intracranial atherosclerotic disease and intracranial stenoses   - CTA shows no significant cervical carotid stenosis, mild to moderate stenosis of the right cavernous ICA, intracranial atherosclerotic disease involving bilateral MCA branches, and small left MCA bifurcation aneurysm upon review by Dr. Johnny Pang  - no endovascular intervention indicated   - Recommend medical management with dual antiplatelet therapy and high intensity statin per Neurology   - NIS signing off, please call with any questions or concerns     Incidental small left MCA aneurysm  - patient can follow up with Dr. Johnny Pang in clinic upon discharge for further discussion  - No acute treatment indicated  - can likely do imaging surveillance as an outpatient    Intermittent nonproductive cough, SOB, and abdominal pain/discomfort  - discussed with Hospitalist about the above patient complaints  - further management per Hospitalist     Plan discussed with Dr. Elian Cool, Dr. Ronnie Shen, RN, and patient. Spoke with patient's daughter Brittany Frost via phone and discussed plan of care with her. (Patient's daughter Brittany Frost Phone number is [phone]). Thank you for this consult and participating in the care of this patient.         Signed By: Isac Cheadle, NP     June 5, 2021

## 2021-06-05 NOTE — PROGRESS NOTES
TRANSFER - IN REPORT:    [facility]jit [facility]  being received from [facility] [place] [facility] (unit) for routine progression of care      Report consisted of patients Situation, Background, Assessment and   Recommendations(SBAR). Information from the following report(s) SBAR, Kardex, Intake/Output, MAR, Recent Results, Cardiac Rhythm NSR and Dual Neuro Assessment was reviewed with the receiving nurse. Opportunity for questions and clarification was provided. Assessment completed upon patients arrival to unit and care assumed. Pt belongings placed in the closet in room 650  Pt states that she came in with a cane (gold with a gray handle). Cane not with patient upon arrival to the floor. This RN called the ER and asked for them to look for it and bring it to room 650.

## 2021-06-05 NOTE — ED NOTES
Bedside shift change report given to RN Aurea Phalen (oncoming nurse) by Enrique Pillai (offgoing nurse). Report included the following information SBAR, Kardex and ED Summary.

## 2021-06-05 NOTE — PROGRESS NOTES
Transition of Care Plan   RUR- 10%    DISPOSITION: TBD; pending medical progression   F/U with PCP/Specialist     Transport: Family     Reason for Admission:  Stroke                      RUR Score:       10%               Plan for utilizing home health:      PT/OT Consults; PT is recommending IPR   [facility]errals [facility] to [facility] and [facility];   The pt does not have a preference in facility     PCP: First and Last name:  Unknown, Provider     Name of Practice:    Are you a current patient: Yes/No: yes    Approximate date of last visit: Unable to identify    Can you participate in a virtual visit with your PCP:                     Current Advanced Directive/Advance Care Plan: Full Code      Healthcare Decision Maker:   Click here to complete [address] including selection of the Healthcare Decision Maker Relationship (ie \"Primary\")                             Transition of Care Plan: This cm met the pt at bedside to conduct the initial evaluation. The pt presented as aox4. The pt confirmed her demographics and insurance provider. The pt identified that she is a [facility] pt. The pt does not have hx of HH but has hx with being at a IPR; unable to recall the name of the facility. The pt received the JJ vaccine this year. The pt receives assistance from her son with completing ADls and IADLs. The pt endorses that her health declined once she contracted COVID earlier this year which resulted in the pt requiring more support with completing ADLs and IADLS and a decrease in mobility. The pt ambulates with a cane in the home and utilizes a WC in the community. The pt identified that the her WC is broken so she has not had an opportunity to go out of the home as much. This cm will continue to follow the pt's SUSIE needs. The pt endorses that she recently had a Stroke. The pt endorses that she receives a income of $1,400 a month.             Medicare pt has received, reviewed, and signed 2nd IM letter informing them of their right to appeal the discharge. Signed copy has been placed on pt bedside chart. Care Management Interventions  PCP Verified by CM: Yes  Mode of Transport at Discharge:  Other (see comment) (Family)  Transition of Care Consult (CM Consult): Discharge Planning  MyChart Signup: No  Discharge Durable Medical Equipment: No ([employer], [address] chair, oxi meter, glucometer )  Physical Therapy Consult: Yes  Occupational Therapy Consult: Yes  Speech Therapy Consult: Yes  Current Support Network: Family Lives [place], Own Home, Other (The pt's son lives with her; Talha Mitchell)  Confirm Follow Up Transport: Family  Discharge Location  Discharge Placement: Unable to determine at this time [facility])  CM: Ayde Pires MSW,   [phone]

## 2021-06-06 LAB
ANION GAP SERPL CALC-SCNC: 7 MMOL/L (ref 5–15)
BUN SERPL-MCNC: 30 MG/DL (ref 6–20)
BUN/CREAT SERPL: 15 (ref 12–20)
CALCIUM SERPL-MCNC: 9.3 MG/DL (ref 8.5–10.1)
CHLORIDE SERPL-SCNC: 107 MMOL/L (ref 97–108)
CO2 SERPL-SCNC: 23 MMOL/L (ref 21–32)
CREAT SERPL-MCNC: 1.97 MG/DL (ref 0.55–1.02)
ERYTHROCYTE [DISTWIDTH] IN BLOOD BY AUTOMATED COUNT: 15.1 % (ref 11.5–14.5)
GLUCOSE BLD STRIP.AUTO-MCNC: 229 MG/DL (ref 65–117)
GLUCOSE BLD STRIP.AUTO-MCNC: 242 MG/DL (ref 65–117)
GLUCOSE BLD STRIP.AUTO-MCNC: 307 MG/DL (ref 65–117)
GLUCOSE BLD STRIP.AUTO-MCNC: 311 MG/DL (ref 65–117)
GLUCOSE SERPL-MCNC: 289 MG/DL (ref 65–100)
HCT VFR BLD AUTO: 31.5 % (ref 35–47)
HGB BLD-MCNC: 9.7 G/DL (ref 11.5–16)
MCH RBC QN AUTO: 24.9 PG (ref 26–34)
MCHC RBC AUTO-ENTMCNC: 30.8 G/DL (ref 30–36.5)
MCV RBC AUTO: 80.8 FL (ref 80–99)
NRBC # BLD: 0 K/UL (ref 0–0.01)
NRBC BLD-RTO: 0 PER 100 WBC
P2Y12 PLT RESPONSE,PPPR: 54 PRU (ref 194–418)
PLATELET # BLD AUTO: 145 K/UL (ref 150–400)
PMV BLD AUTO: 12.2 FL (ref 8.9–12.9)
POTASSIUM SERPL-SCNC: 4 MMOL/L (ref 3.5–5.1)
PROCALCITONIN SERPL-MCNC: <0.05 NG/ML
RBC # BLD AUTO: 3.9 M/UL (ref 3.8–5.2)
SERVICE CMNT-IMP: ABNORMAL
SODIUM SERPL-SCNC: 137 MMOL/L (ref 136–145)
WBC # BLD AUTO: 6.3 K/UL (ref 3.6–11)

## 2021-06-06 PROCEDURE — 80048 BASIC METABOLIC PNL TOTAL CA: CPT

## 2021-06-06 PROCEDURE — 74011000250 HC RX REV CODE- 250: Performed by: INTERNAL MEDICINE

## 2021-06-06 PROCEDURE — 99232 SBSQ HOSP IP/OBS MODERATE 35: CPT | Performed by: PSYCHIATRY & NEUROLOGY

## 2021-06-06 PROCEDURE — 84145 PROCALCITONIN (PCT): CPT

## 2021-06-06 PROCEDURE — 65660000000 HC RM CCU STEPDOWN

## 2021-06-06 PROCEDURE — 74011000250 HC RX REV CODE- 250: Performed by: FAMILY MEDICINE

## 2021-06-06 PROCEDURE — 82962 GLUCOSE BLOOD TEST: CPT

## 2021-06-06 PROCEDURE — 74011250637 HC RX REV CODE- 250/637: Performed by: FAMILY MEDICINE

## 2021-06-06 PROCEDURE — 85576 BLOOD PLATELET AGGREGATION: CPT

## 2021-06-06 PROCEDURE — 85027 COMPLETE CBC AUTOMATED: CPT

## 2021-06-06 PROCEDURE — 74011250637 HC RX REV CODE- 250/637: Performed by: PSYCHIATRY & NEUROLOGY

## 2021-06-06 PROCEDURE — 36415 COLL VENOUS BLD VENIPUNCTURE: CPT

## 2021-06-06 PROCEDURE — 94760 N-INVAS EAR/PLS OXIMETRY 1: CPT

## 2021-06-06 PROCEDURE — 94640 AIRWAY INHALATION TREATMENT: CPT

## 2021-06-06 PROCEDURE — 74011636637 HC RX REV CODE- 636/637: Performed by: INTERNAL MEDICINE

## 2021-06-06 PROCEDURE — 74011250637 HC RX REV CODE- 250/637: Performed by: INTERNAL MEDICINE

## 2021-06-06 RX ORDER — SENNOSIDES 8.6 MG/1
1 TABLET ORAL 2 TIMES DAILY
COMMUNITY
End: 2021-06-12

## 2021-06-06 RX ORDER — FLUTICASONE PROPIONATE 50 MCG
2 SPRAY, SUSPENSION (ML) NASAL DAILY
COMMUNITY

## 2021-06-06 RX ORDER — AMLODIPINE BESYLATE 5 MG/1
10 TABLET ORAL DAILY
Status: DISCONTINUED | OUTPATIENT
Start: 2021-06-06 | End: 2021-06-08

## 2021-06-06 RX ORDER — ATORVASTATIN CALCIUM 80 MG/1
80 TABLET, FILM COATED ORAL DAILY
COMMUNITY

## 2021-06-06 RX ORDER — ALBUTEROL SULFATE 0.83 MG/ML
2.5 SOLUTION RESPIRATORY (INHALATION)
Status: DISCONTINUED | OUTPATIENT
Start: 2021-06-06 | End: 2021-06-07

## 2021-06-06 RX ORDER — ALBUTEROL SULFATE 0.83 MG/ML
2.5 SOLUTION RESPIRATORY (INHALATION) 2 TIMES DAILY
Status: DISCONTINUED | OUTPATIENT
Start: 2021-06-06 | End: 2021-06-06

## 2021-06-06 RX ORDER — ONDANSETRON 8 MG/1
8 TABLET, ORALLY DISINTEGRATING ORAL DAILY
COMMUNITY

## 2021-06-06 RX ORDER — QUETIAPINE FUMARATE 25 MG/1
25 TABLET, FILM COATED ORAL
COMMUNITY
End: 2021-06-12

## 2021-06-06 RX ORDER — LISINOPRIL 10 MG/1
10 TABLET ORAL DAILY
COMMUNITY
End: 2021-06-12

## 2021-06-06 RX ORDER — ALBUTEROL SULFATE 90 UG/1
2 AEROSOL, METERED RESPIRATORY (INHALATION)
COMMUNITY

## 2021-06-06 RX ORDER — DICLOFENAC SODIUM 10 MG/G
2 GEL TOPICAL 2 TIMES DAILY
COMMUNITY

## 2021-06-06 RX ORDER — CARVEDILOL 6.25 MG/1
6.25 TABLET ORAL 2 TIMES DAILY WITH MEALS
COMMUNITY
End: 2021-06-12

## 2021-06-06 RX ORDER — GABAPENTIN 100 MG/1
100 CAPSULE ORAL 2 TIMES DAILY
COMMUNITY
End: 2021-06-12

## 2021-06-06 RX ORDER — IPRATROPIUM BROMIDE AND ALBUTEROL SULFATE 2.5; .5 MG/3ML; MG/3ML
3 SOLUTION RESPIRATORY (INHALATION)
COMMUNITY

## 2021-06-06 RX ORDER — ZINC GLUCONATE 10 MG
250 LOZENGE ORAL DAILY
COMMUNITY
End: 2021-06-12

## 2021-06-06 RX ORDER — CARVEDILOL 6.25 MG/1
6.25 TABLET ORAL 2 TIMES DAILY WITH MEALS
Status: DISCONTINUED | OUTPATIENT
Start: 2021-06-06 | End: 2021-06-07

## 2021-06-06 RX ORDER — BENZONATATE 100 MG/1
100 CAPSULE ORAL
COMMUNITY

## 2021-06-06 RX ORDER — AMLODIPINE BESYLATE 10 MG/1
10 TABLET ORAL DAILY
COMMUNITY
End: 2021-06-12

## 2021-06-06 RX ORDER — ASPIRIN 81 MG/1
81 TABLET ORAL DAILY
COMMUNITY

## 2021-06-06 RX ORDER — DULOXETIN HYDROCHLORIDE 60 MG/1
60 CAPSULE, DELAYED RELEASE ORAL DAILY
Status: ON HOLD | COMMUNITY
End: 2021-06-12 | Stop reason: SDUPTHER

## 2021-06-06 RX ORDER — ACETAMINOPHEN 500 MG
500 TABLET ORAL
COMMUNITY

## 2021-06-06 RX ORDER — ERGOCALCIFEROL 1.25 MG/1
50000 CAPSULE ORAL
COMMUNITY

## 2021-06-06 RX ORDER — LORATADINE 10 MG/1
10 TABLET ORAL DAILY
COMMUNITY
End: 2021-06-12

## 2021-06-06 RX ORDER — GLIPIZIDE 10 MG/1
10 TABLET ORAL 2 TIMES DAILY
COMMUNITY
End: 2021-06-12

## 2021-06-06 RX ADMIN — INSULIN LISPRO 2 UNITS: 100 INJECTION, SOLUTION INTRAVENOUS; SUBCUTANEOUS at 22:25

## 2021-06-06 RX ADMIN — ASPIRIN 81 MG: 81 TABLET, CHEWABLE ORAL at 08:38

## 2021-06-06 RX ADMIN — CARVEDILOL 6.25 MG: 6.25 TABLET, FILM COATED ORAL at 17:23

## 2021-06-06 RX ADMIN — BACLOFEN 2.5 MG: 10 TABLET ORAL at 17:23

## 2021-06-06 RX ADMIN — INSULIN LISPRO 2 UNITS: 100 INJECTION, SOLUTION INTRAVENOUS; SUBCUTANEOUS at 12:37

## 2021-06-06 RX ADMIN — HUMAN INSULIN 7 UNITS: 100 INJECTION, SUSPENSION SUBCUTANEOUS at 08:38

## 2021-06-06 RX ADMIN — PANTOPRAZOLE SODIUM 40 MG: 40 TABLET, DELAYED RELEASE ORAL at 17:23

## 2021-06-06 RX ADMIN — AMLODIPINE BESYLATE 10 MG: 5 TABLET ORAL at 08:37

## 2021-06-06 RX ADMIN — TICAGRELOR 30 MG: 60 TABLET ORAL at 08:38

## 2021-06-06 RX ADMIN — FLUTICASONE PROPIONATE 2 SPRAY: 50 SPRAY, METERED NASAL at 08:45

## 2021-06-06 RX ADMIN — ATORVASTATIN CALCIUM 80 MG: 40 TABLET, FILM COATED ORAL at 22:11

## 2021-06-06 RX ADMIN — GUAIFENESIN 600 MG: 600 TABLET, EXTENDED RELEASE ORAL at 08:38

## 2021-06-06 RX ADMIN — HUMAN INSULIN 7 UNITS: 100 INJECTION, SUSPENSION SUBCUTANEOUS at 17:23

## 2021-06-06 RX ADMIN — PANTOPRAZOLE SODIUM 40 MG: 40 TABLET, DELAYED RELEASE ORAL at 08:37

## 2021-06-06 RX ADMIN — GUAIFENESIN 600 MG: 600 TABLET, EXTENDED RELEASE ORAL at 17:23

## 2021-06-06 RX ADMIN — TICAGRELOR 30 MG: 60 TABLET ORAL at 20:20

## 2021-06-06 RX ADMIN — CARVEDILOL 6.25 MG: 6.25 TABLET, FILM COATED ORAL at 08:37

## 2021-06-06 RX ADMIN — BACLOFEN 2.5 MG: 10 TABLET ORAL at 22:11

## 2021-06-06 RX ADMIN — ACETAMINOPHEN 650 MG: 325 TABLET ORAL at 22:27

## 2021-06-06 RX ADMIN — ALBUTEROL SULFATE 2.5 MG: 2.5 SOLUTION RESPIRATORY (INHALATION) at 09:17

## 2021-06-06 RX ADMIN — ALBUTEROL SULFATE 2.5 MG: 2.5 SOLUTION RESPIRATORY (INHALATION) at 21:43

## 2021-06-06 RX ADMIN — INSULIN LISPRO 4 UNITS: 100 INJECTION, SOLUTION INTRAVENOUS; SUBCUTANEOUS at 17:23

## 2021-06-06 RX ADMIN — BACLOFEN 2.5 MG: 10 TABLET ORAL at 08:38

## 2021-06-06 RX ADMIN — INSULIN LISPRO 7 UNITS: 100 INJECTION, SOLUTION INTRAVENOUS; SUBCUTANEOUS at 08:38

## 2021-06-06 NOTE — CONSULTS
Neurology Progress Note    Patient ID:  Aracelis Dennison  [id number]  77 y.o.  1945    Chief Complaint: Stroke    Subjective:     69-year-old woman who has a new acute on chronic right MCA territory infarct. Yesterday she was switched to Brilinta since Plavix was not responsive. Neuro interventional saw her recommending medical management. This morning she is very sad and tearful. She wants to go home.     Objective:       ROS:  Per HPI  All other 12 pt ROS negative    Meds:  Current Facility-Administered Medications   Medication Dose Route Frequency    insulin NPH (NOVOLIN N, HUMULIN N) injection 7 Units  7 Units SubCUTAneous ACB&D    amLODIPine (NORVASC) tablet 10 mg  10 mg Oral DAILY    carvediloL (COREG) tablet 6.25 mg  6.25 mg Oral BID WITH MEALS    ticagrelor (BRILINTA) tablet 30 mg  30 mg Oral Q12H    baclofen (LIORESAL) tablet 2.5 mg  2.5 mg Oral TID    fluticasone propionate (FLONASE) 50 mcg/actuation nasal spray 2 Spray  2 Spray Both Nostrils DAILY    albuterol (PROVENTIL VENTOLIN) nebulizer solution 2.5 mg  2.5 mg Nebulization QID RT    albuterol (PROVENTIL VENTOLIN) nebulizer solution 2.5 mg  2.5 mg Nebulization Q4H PRN    pantoprazole (PROTONIX) tablet 40 mg  40 mg Oral BID    guaiFENesin ER (MUCINEX) tablet 600 mg  600 mg Oral BID    benzonatate (TESSALON) capsule 100 mg  100 mg Oral TID PRN    glucose chewable tablet 16 g  4 Tablet Oral PRN    dextrose (D50W) injection syrg 12.5-25 g  25-50 mL IntraVENous PRN    glucagon (GLUCAGEN) injection 1 mg  1 mg IntraMUSCular PRN    insulin lispro (HUMALOG) injection   SubCUTAneous AC&HS    acetaminophen (TYLENOL) tablet 650 mg  650 mg Oral Q4H PRN    Or    acetaminophen (TYLENOL) solution 650 mg  650 mg Per NG tube Q4H PRN    Or    acetaminophen (TYLENOL) suppository 650 mg  650 mg Rectal Q4H PRN    aspirin chewable tablet 81 mg  81 mg Oral DAILY    atorvastatin (LIPITOR) tablet 80 mg  80 mg Oral QHS       MRI Results (maximum last 3):  Results from Hospital Encounter encounter on 06/04/21    MRI BRAIN WO CONT    Narrative  PRELIMINARY REPORT:    Encephalomalacia right frontal lobe. Areas of hemosiderin deposition compatible  with chronic hemorrhage. An acute hemorrhage is not identified. There are linear  areas of restricted diffusion demonstrating decreased signal on ADC concerning  for acute on chronic infarct. Extensive white matter changes with punctate hemosiderin deposition in the basal  ganglia    Preliminary report was provided by Dr. Viky Rao, the on-call radiologist, at 2104    Final report to follow.       Lab Review   Recent Results (from the past 24 hour(s))   ECHO ADULT COMPLETE    Collection Time: 06/05/21  1:12 PM   Result Value Ref Range    MV E/A 0.79     LV Mass .8 67.0 - 162.0 g    LV Mass AL Index 95.6 43.0 - 95.0 g/m2    Left Atrium Minor Axis 2.07 cm    LA Vol Index 35.21 16.00 - 28.00 ml/m2    LA Vol Index 30.74 16.00 - 28.00 ml/m2    LA Vol Index 33.36 16.00 - 28.00 ml/m2    SHUN/BSA Pk Naldo 1.0 cm2/m2    IVSd 1.30 (A) 0.60 - 0.90 cm    LVIDd 3.98 3.90 - 5.30 cm    LVIDs 2.58 cm    LVOT d 2.00 cm    LVPWd 1.24 (A) 0.60 - 0.90 cm    LVOT Peak Gradient 2.87 mmHg    LVOT Peak Velocity 84.70 cm/s    RVIDd 3.11 cm    Left Atrium Major Axis 3.88 cm    LA Volume 65.84 22.0 - 52.0 mL    LA Area 4C 21.37 cm2    LA Vol 2C 57.49 (A) 22.00 - 52.00 mL    LA Vol 4C 62.38 (A) 22.00 - 52.00 mL    Aortic Valve Area by Continuity of Peak Velocity 1.92 cm2    AoV PG 7.62 mmHg    Aortic Valve Systolic Peak Velocity 241.99 cm/s    MV A Naldo 84.99 cm/s    Mitral Valve E Wave Deceleration Time 166.93 ms    MV E Naldo 67.53 cm/s    E/E' ratio (averaged) 15.72     E/E' lateral 12.62     E/E' septal 18.81     LV E' Lateral Velocity 5.35 cm/s    LV E' Septal Velocity 3.59 cm/s    Mitral Valve Pressure Half-time 48.41 ms    MVA (PHT) 4.54 cm2    Pulmonic Regurgitant End Max Velocity 114.17 cm/s    Pulmonic Valve Systolic Peak Instantaneous Gradient 3.18 mmHg    Pulmonic Valve Max Velocity 89.11 cm/s    Tapse 1.66 1.50 - 2.00 cm    Triscuspid Valve Regurgitation Peak Gradient 30.60 mmHg    TR Max Velocity 276.60 cm/s    Ao Root D 2.98 cm   GLUCOSE, POC    Collection Time: 06/05/21  8:59 PM   Result Value Ref Range    Glucose (POC) 310 (H) 65 - 117 mg/dL    Performed by Lillian KEARNEY, POC    Collection Time: 06/06/21  7:53 AM   Result Value Ref Range    Glucose (POC) 311 (H) 65 - 117 mg/dL    Performed by Lisa Santizo        Additional comments:I reviewed the patients new imaging test results. Patient Vitals for the past 8 hrs:   BP Temp Pulse Resp SpO2 Weight   06/06/21 0917 -- -- -- -- 98 % --   06/06/21 0837 (!) 171/88 -- 84 -- -- --   06/06/21 0610 (!) 186/80 97.6 °F (36.4 °C) 84 15 96 % --   06/06/21 0200 (!) 153/83 98.1 °F (36.7 °C) 71 16 97 % 178 lb 9.2 oz (81 kg)       No intake/output data recorded. No intake/output data recorded. Exam:  Visit Vitals  BP (!) 171/88   Pulse 84   Temp 97.6 °F (36.4 °C)   Resp 15   Ht 5' 4\" (1.626 m)   Wt 178 lb 9.2 oz (81 kg)   SpO2 98%   BMI 30.65 kg/m²     Gen: Well developed  CV: RRR  Lungs: non labored breathing  Abd: soft, non distended  Neuro: Awake and alert but very tearful and sad this morning, dysarthric  CN II-XII: PERRL,  face asymmetric, tongue/palate midline  Motor: Moving extremities spontaneously but not actively tested  Sensory: intact to LT  DTRs: symmetric  COOR: no limb/truncal ataxia  Gait: Deferred    PROBLEM LIST:     Patient Active Problem List   Diagnosis Code    CVA (cerebral vascular accident) ([facility] [address] I63.9    Intracranial atherosclerosis I67.2    Stenosis of right carotid artery I65.21    Aneurysm of middle cerebral artery I67.1       Assessment/Plan:      71-year-old woman who has extensive chronic ischemia at baseline who has a new acute right MCA infarct on previous infarcted territory. Check platelet assay today for Brilinta responsiveness.   Stay on low-dose aspirin. It looks like she is being considered for inpatient therapy. Stay on high-dose statin. We spent the majority of this visit this morning discussing her mood as she was acutely upset over the change in her health. Neurology will revisit with her again tomorrow. During this evaluation, we also discussed stroke education to include signs and symptoms of stroke and TIA. This clinical note was dictated with an electronic dictation software that can make unintentional errors. If there are any questions, please contact me directly for clarification.       Signed:  Sammy De Jesus DO  6/6/2021  9:30 AM

## 2021-06-06 NOTE — PROGRESS NOTES
Bedside shift change report given to [facility] (oncoming nurse) by Tommy Barber (offgoing nurse). Report included the following information SBAR, Intake/Output, MAR, Cardiac Rhythm NSR and Dual Neuro Assessment.

## 2021-06-06 NOTE — PROGRESS NOTES
[facility] [facility]  Hospitalist Group                                                                                          Hospitalist Progress Note  Martha Mata MD  Answering service: [phone] from in house phone        Date of Service:  2021  NAME:  Kyle Gibson  :  1945  MRN:  [id number]      Admission Summary:   Kyle Gibson is a 77 y.o. female who presents with dysarthria     History is primary obtained from the patient     Patient reports that she went to bed at [facility] at around 10:00 last night. Woke up this morning and had a headache associated with slurred speech. Patient reports that she has had a stroke in the past and has left-sided deficit. Patient came to the ER as a code stroke, patient currently reports that all her symptoms have resolved. Patient denies any other complaints or problems. Patient was requested to be admitted to the hospitalist service. Patient denies any recent falls or injuries. Interval history / Subjective: Follow up TIA, headache with slurred speech. Patient seen and examined at the bedside. Labs, images and notes reviewed  Discussed with nursing staff, orders reviewed. Plan discussed with patient/Family  Feeling ok. Tearful for being in the hospital and for her stroke. \"Why can't I go with my son and family which has gone? Why have God keep me here? \". No other concerns. Cough little better but still not much different. Assessment & Plan:     #Headache with slurred speech, resolved. Likely TIA Vs CVA  #Subacute cough without any sputum,  #Right nasal stuffiness  #DM2 with hyperglycemia. A1c 8.2  #CKD 3  #Elevated blood pressure  #Anemia, acute/chronic. No baseline. #Mild thrombocytopenia  #Hypertriglyceridemia, triglycerides 534. Total cholesterol 243    -Admitted to inpatient, neuro telemetry  -Neurology on board, appreciate recommendations. -NIS on board, appreciate recommendations.   Medical management  -Dual antiplatelet regimen with aspirin and Brilinta per neurology  -Follow MRI brain without contrast  -Follow echocardiogram  -PT/OT/ST  -Uptitrate Amlodipine 10mg daily  -Add Coreg 6.25mg BID  -Check PCT level  -CXR for cough, unremarkable 6/5  -Continue pantoprazole 40 mg twice daily for possible GERD as a differential of cough  -Mucinex 600 mg twice daily  -Tessalon Perles as needed  -Atorvastatin 80 mg daily at bedtime  -Add breathing treatments: Scheduled albuterol as well as PRN for excessive cough  -Humalog sliding scale insulin with serial Accu-Cheks  -If continues to remain emotional, will consider psychiatry consult for depression assessment and management    Code status: Full code  DVT prophylaxis: SCD    Care Plan discussed with: Patient/Family and Nurse  Anticipated Disposition: [facility]/Rehab  Anticipated Discharge: Greater than 48 hours     Hospital Problems  Never Reviewed        Codes Class Noted POA    CVA (cerebral vascular accident) ([facility] [address] ICD-10-CM: I63.9  ICD-9-CM: 434.91  6/4/2021 Unknown        Intracranial atherosclerosis ICD-10-CM: W83.7  ICD-9-CM: 437.0  6/4/2021 Yes        Stenosis of right carotid artery ICD-10-CM: I65.21  ICD-9-CM: 433.10  6/4/2021 Yes        Aneurysm of middle cerebral artery ICD-10-CM: I67.1  ICD-9-CM: 437.3  6/4/2021 Yes    Overview Signed 6/5/2021  5:20 PM by Eliane Sharma NP     Left MCA aneurysm                      Review of Systems:   A comprehensive review of systems was negative except for that written in the HPI. Vital Signs:    Last 24hrs VS reviewed since prior progress note.  Most recent are:  Visit Vitals  BP (!) 171/88   Pulse 84   Temp 97.6 °F (36.4 °C)   Resp 15   Ht 5' 4\" (1.626 m)   Wt 81 kg (178 lb 9.2 oz)   SpO2 98%   BMI 30.65 kg/m²       No intake or output data in the 24 hours ending 06/06/21 0931     Physical Examination:     I had a face to face encounter with this patient and independently examined them on 6/6/2021 as outlined below:          General : alert x 3, awake, NAD, appears to be stated age. Tearful. Up in the chair for breakfast  HEENT: PEERL, EOMI, moist mucus membrane, TM clear  Neck: supple,   Chest: CTAB, no RRW  CVS: S1 S2 heard,   Abd: soft/ Non tender, non distended, BS physiological,   Ext: no clubbing, no cyanosis,   Neuro/Psych: pleasant mood and affect, CN 2-12 grossly intact, sensory grossly within normal limit, Strength 4/5 in LUE and LL, 5/5 right upper and right lower extremity weakness, DTR 1+ x 4  Skin: warm          Data Review:    Review and/or order of clinical lab test  Review and/or order of tests in the radiology section of CPT  Review and/or order of tests in the medicine section of CPT    Radiology:  XR CHEST PORT    Result Date: 6/5/2021  No acute process. CTA CODE NEURO HEAD AND NECK W CONT    Result Date: 6/4/2021  1. Small matched perfusion defect in the location of the patient's chronic right frontal infarct. Otherwise negative CT perfusion study, without perfusion mismatch. 2. No acute large vessel occlusion. 3. Moderate left internal carotid artery origin stenosis. Moderate to severe bilateral cavernous carotid stenoses. Irregularity of the middle cerebral arteries bilaterally without focal flow-limiting stenosis. CT CODE NEURO HEAD WO CONTRAST    Result Date: 6/4/2021  Old right frontal infarct and significant small vessel ischemic changes. No acute abnormality. CT CODE NEURO PERF W CBF    Result Date: 6/4/2021  1. Small matched perfusion defect in the location of the patient's chronic right frontal infarct. Otherwise negative CT perfusion study, without perfusion mismatch. 2. No acute large vessel occlusion. 3. Moderate left internal carotid artery origin stenosis. Moderate to severe bilateral cavernous carotid stenoses. Irregularity of the middle cerebral arteries bilaterally without focal flow-limiting stenosis.       Labs:     Recent Labs     06/05/21  0448 06/04/21  1106 WBC 6.9 5.6   HGB 9.5* 8.4*   HCT 30.9* 28.0*    130*     Recent Labs     06/04/21  1106   *   K 4.4      CO2 22   BUN 24*   CREA 1.45*   *   CA 8.9     Recent Labs     06/04/21  1106   ALT 17      TBILI 0.3   TP 6.7   ALB 3.2*   GLOB 3.5     Recent Labs     06/04/21  1106   INR 1.1   PTP 11.0      Recent Labs     06/04/21  1803   TIBC 226*   PSAT 21      No results found for: FOL, RBCF   No results for input(s): PH, PCO2, PO2 in the last 72 hours.   Recent Labs     06/05/21  0448 06/04/21  1803 06/04/21  1106   * 255*  --    TROIQ <0.05 <0.05 <0.05     Lab Results   Component Value Date/Time    Cholesterol, total 243 (H) 06/05/2021 04:48 AM    HDL Cholesterol 37 06/05/2021 04:48 AM    LDL,Direct 82 06/05/2021 04:48 AM    LDL, calculated Not calculated due to elevated triglyceride level 06/05/2021 04:48 AM    Triglyceride 534 (H) 06/05/2021 04:48 AM    CHOL/HDL Ratio 6.6 (H) 06/05/2021 04:48 AM     Lab Results   Component Value Date/Time    Glucose (POC) 311 (H) 06/06/2021 07:53 AM    Glucose (POC) 310 (H) 06/05/2021 08:59 PM     No results found for: COLOR, APPRN, SPGRU, REFSG, MARCELO, PROTU, GLUCU, KETU, BILU, UROU, DYLON, LEUKU, GLUKE, EPSU, BACTU, WBCU, RBCU, CASTS, UCRY      Medications Reviewed:     Current Facility-Administered Medications   Medication Dose Route Frequency    insulin NPH (NOVOLIN N, HUMULIN N) injection 7 Units  7 Units SubCUTAneous ACB&D    amLODIPine (NORVASC) tablet 10 mg  10 mg Oral DAILY    carvediloL (COREG) tablet 6.25 mg  6.25 mg Oral BID WITH MEALS    ticagrelor (BRILINTA) tablet 30 mg  30 mg Oral Q12H    baclofen (LIORESAL) tablet 2.5 mg  2.5 mg Oral TID    fluticasone propionate (FLONASE) 50 mcg/actuation nasal spray 2 Spray  2 Spray Both Nostrils DAILY    albuterol (PROVENTIL VENTOLIN) nebulizer solution 2.5 mg  2.5 mg Nebulization QID RT    albuterol (PROVENTIL VENTOLIN) nebulizer solution 2.5 mg  2.5 mg Nebulization Q4H PRN    pantoprazole (PROTONIX) tablet 40 mg  40 mg Oral BID    guaiFENesin ER (MUCINEX) tablet 600 mg  600 mg Oral BID    benzonatate (TESSALON) capsule 100 mg  100 mg Oral TID PRN    glucose chewable tablet 16 g  4 Tablet Oral PRN    dextrose (D50W) injection syrg 12.5-25 g  25-50 mL IntraVENous PRN    glucagon (GLUCAGEN) injection 1 mg  1 mg IntraMUSCular PRN    insulin lispro (HUMALOG) injection   SubCUTAneous AC&HS    acetaminophen (TYLENOL) tablet 650 mg  650 mg Oral Q4H PRN    Or    acetaminophen (TYLENOL) solution 650 mg  650 mg Per NG tube Q4H PRN    Or    acetaminophen (TYLENOL) suppository 650 mg  650 mg Rectal Q4H PRN    aspirin chewable tablet 81 mg  81 mg Oral DAILY    atorvastatin (LIPITOR) tablet 80 mg  80 mg Oral QHS     ______________________________________________________________________  EXPECTED LENGTH OF STAY: - - -  ACTUAL LENGTH OF STAY:          2                 Jorge Lao MD

## 2021-06-06 NOTE — PROGRESS NOTES
Problem: Diabetes Self-Management  Goal: *Disease process and treatment process  Description: Define diabetes and identify own type of diabetes; list 3 options for treating diabetes. Outcome: Progressing Towards Goal  Goal: *Incorporating physical activity into lifestyle  Description: State effect of exercise on blood glucose levels. Outcome: Progressing Towards Goal  Goal: *Using medications safely  Description: State effect of diabetes medications on diabetes; name diabetes medication taking, action and side effects. Outcome: Progressing Towards Goal     Problem: Falls - Risk of  Goal: *Absence of Falls  Description: Document Wendi Arambula Fall Risk and appropriate interventions in the flowsheet.   Outcome: Progressing Towards Goal  Note: Fall Risk Interventions:            Medication Interventions: Bed/chair exit alarm    Elimination Interventions: Bed/chair exit alarm, Call light in reach    History of Falls Interventions: Door open when patient unattended, Bed/chair exit alarm         Problem: Breathing Pattern - Ineffective  Goal: *Use of effective breathing techniques  Outcome: Progressing Towards Goal

## 2021-06-06 NOTE — PROGRESS NOTES
Bedside and Verbal shift change report given to Nicolette MARRERO (oncoming nurse) by Karthik Martinez (offgoing nurse). Report included the following information SBAR, Kardex, Intake/Output, MAR, Recent Results, Cardiac Rhythm NSR and Dual Neuro Assessment.

## 2021-06-06 NOTE — PROGRESS NOTES
Problem: Diabetes Self-Management  Goal: *Disease process and treatment process  Description: Define diabetes and identify own type of diabetes; list 3 options for treating diabetes. Outcome: Progressing Towards Goal  Goal: *Incorporating nutritional management into lifestyle  Description: Describe effect of type, amount and timing of food on blood glucose; list 3 methods for planning meals. Outcome: Progressing Towards Goal  Goal: *Incorporating physical activity into lifestyle  Description: State effect of exercise on blood glucose levels. Outcome: Progressing Towards Goal  Goal: *Developing strategies to promote health/change behavior  Description: Define the ABC's of diabetes; identify appropriate screenings, schedule and personal plan for screenings. Outcome: Progressing Towards Goal  Goal: *Using medications safely  Description: State effect of diabetes medications on diabetes; name diabetes medication taking, action and side effects. Outcome: Progressing Towards Goal  Goal: *Monitoring blood glucose, interpreting and using results  Description: Identify recommended blood glucose targets  and personal targets. Outcome: Progressing Towards Goal  Goal: *Prevention, detection, treatment of acute complications  Description: List symptoms of hyper- and hypoglycemia; describe how to treat low blood sugar and actions for lowering  high blood glucose level. Outcome: Progressing Towards Goal  Goal: *Prevention, detection and treatment of chronic complications  Description: Define the natural course of diabetes and describe the relationship of blood glucose levels to long term complications of diabetes.   Outcome: Progressing Towards Goal  Goal: *Developing strategies to address psychosocial issues  Description: Describe feelings about living with diabetes; identify support needed and support network  Outcome: Progressing Towards Goal  Goal: *Insulin pump training  Outcome: Progressing Towards Goal  Goal: *Sick day guidelines  Outcome: Progressing Towards Goal  Goal: *Patient Specific Goal (EDIT GOAL, INSERT TEXT)  Outcome: Progressing Towards Goal     Problem: Patient Education: Go to Patient Education Activity  Goal: Patient/Family Education  Outcome: Progressing Towards Goal     Problem: Falls - Risk of  Goal: *Absence of Falls  Description: Document Leighann Ventura Fall Risk and appropriate interventions in the flowsheet.   Outcome: Progressing Towards Goal  Note: Fall Risk Interventions:            Medication Interventions: Bed/chair exit alarm    Elimination Interventions: Call light in reach    History of Falls Interventions: Bed/chair exit alarm         Problem: Patient Education: Go to Patient Education Activity  Goal: Patient/Family Education  Outcome: Progressing Towards Goal     Problem: Patient Education: Go to Patient Education Activity  Goal: Patient/Family Education  Outcome: Progressing Towards Goal     Problem: Patient Education: Go to Patient Education Activity  Goal: Patient/Family Education  Outcome: Progressing Towards Goal     Problem: Discharge Planning  Goal: *Discharge to safe environment  Outcome: Progressing Towards Goal  Goal: *Knowledge of medication management  Outcome: Progressing Towards Goal  Goal: *Knowledge of discharge instructions  Outcome: Progressing Towards Goal     Problem: Patient Education: Go to Patient Education Activity  Goal: Patient/Family Education  Outcome: Progressing Towards Goal

## 2021-06-06 NOTE — PROGRESS NOTES
Bedside shift change report given to *** (oncoming nurse) by *** (offgoing nurse). Report included the following information {SBAR REPORTS [id number]}.

## 2021-06-07 LAB
ANION GAP SERPL CALC-SCNC: 8 MMOL/L (ref 5–15)
BUN SERPL-MCNC: 30 MG/DL (ref 6–20)
BUN/CREAT SERPL: 15 (ref 12–20)
CALCIUM SERPL-MCNC: 9.6 MG/DL (ref 8.5–10.1)
CHLORIDE SERPL-SCNC: 107 MMOL/L (ref 97–108)
CO2 SERPL-SCNC: 23 MMOL/L (ref 21–32)
CREAT SERPL-MCNC: 2.05 MG/DL (ref 0.55–1.02)
ERYTHROCYTE [DISTWIDTH] IN BLOOD BY AUTOMATED COUNT: 15.1 % (ref 11.5–14.5)
GLUCOSE BLD STRIP.AUTO-MCNC: 194 MG/DL (ref 65–117)
GLUCOSE BLD STRIP.AUTO-MCNC: 250 MG/DL (ref 65–117)
GLUCOSE BLD STRIP.AUTO-MCNC: 264 MG/DL (ref 65–117)
GLUCOSE BLD STRIP.AUTO-MCNC: 316 MG/DL (ref 65–117)
GLUCOSE SERPL-MCNC: 290 MG/DL (ref 65–100)
HCT VFR BLD AUTO: 34 % (ref 35–47)
HGB BLD-MCNC: 10.4 G/DL (ref 11.5–16)
MCH RBC QN AUTO: 25.1 PG (ref 26–34)
MCHC RBC AUTO-ENTMCNC: 30.6 G/DL (ref 30–36.5)
MCV RBC AUTO: 81.9 FL (ref 80–99)
NRBC # BLD: 0 K/UL (ref 0–0.01)
NRBC BLD-RTO: 0 PER 100 WBC
PLATELET # BLD AUTO: 176 K/UL (ref 150–400)
PMV BLD AUTO: 12.8 FL (ref 8.9–12.9)
POTASSIUM SERPL-SCNC: 3.9 MMOL/L (ref 3.5–5.1)
RBC # BLD AUTO: 4.15 M/UL (ref 3.8–5.2)
SERVICE CMNT-IMP: ABNORMAL
SODIUM SERPL-SCNC: 138 MMOL/L (ref 136–145)
WBC # BLD AUTO: 6.7 K/UL (ref 3.6–11)

## 2021-06-07 PROCEDURE — 99233 SBSQ HOSP IP/OBS HIGH 50: CPT | Performed by: CLINICAL NURSE SPECIALIST

## 2021-06-07 PROCEDURE — 85027 COMPLETE CBC AUTOMATED: CPT

## 2021-06-07 PROCEDURE — 74011250636 HC RX REV CODE- 250/636: Performed by: INTERNAL MEDICINE

## 2021-06-07 PROCEDURE — 74011250637 HC RX REV CODE- 250/637: Performed by: INTERNAL MEDICINE

## 2021-06-07 PROCEDURE — 97530 THERAPEUTIC ACTIVITIES: CPT | Performed by: OCCUPATIONAL THERAPIST

## 2021-06-07 PROCEDURE — 74011636637 HC RX REV CODE- 636/637: Performed by: INTERNAL MEDICINE

## 2021-06-07 PROCEDURE — 92522 EVALUATE SPEECH PRODUCTION: CPT | Performed by: SPEECH-LANGUAGE PATHOLOGIST

## 2021-06-07 PROCEDURE — 74011250637 HC RX REV CODE- 250/637: Performed by: FAMILY MEDICINE

## 2021-06-07 PROCEDURE — 36415 COLL VENOUS BLD VENIPUNCTURE: CPT

## 2021-06-07 PROCEDURE — 74011250637 HC RX REV CODE- 250/637: Performed by: PSYCHIATRY & NEUROLOGY

## 2021-06-07 PROCEDURE — 82962 GLUCOSE BLOOD TEST: CPT

## 2021-06-07 PROCEDURE — 80048 BASIC METABOLIC PNL TOTAL CA: CPT

## 2021-06-07 PROCEDURE — 97116 GAIT TRAINING THERAPY: CPT

## 2021-06-07 PROCEDURE — 65660000000 HC RM CCU STEPDOWN

## 2021-06-07 PROCEDURE — 94760 N-INVAS EAR/PLS OXIMETRY 1: CPT

## 2021-06-07 PROCEDURE — 99233 SBSQ HOSP IP/OBS HIGH 50: CPT | Performed by: NURSE PRACTITIONER

## 2021-06-07 RX ORDER — ONDANSETRON 2 MG/ML
4 INJECTION INTRAMUSCULAR; INTRAVENOUS
Status: DISCONTINUED | OUTPATIENT
Start: 2021-06-07 | End: 2021-06-12 | Stop reason: HOSPADM

## 2021-06-07 RX ORDER — SODIUM CHLORIDE 9 MG/ML
100 INJECTION, SOLUTION INTRAVENOUS CONTINUOUS
Status: DISCONTINUED | OUTPATIENT
Start: 2021-06-07 | End: 2021-06-11

## 2021-06-07 RX ORDER — ALBUTEROL SULFATE 0.83 MG/ML
2.5 SOLUTION RESPIRATORY (INHALATION)
Status: DISCONTINUED | OUTPATIENT
Start: 2021-06-07 | End: 2021-06-12 | Stop reason: HOSPADM

## 2021-06-07 RX ORDER — CARVEDILOL 12.5 MG/1
12.5 TABLET ORAL 2 TIMES DAILY WITH MEALS
Status: DISCONTINUED | OUTPATIENT
Start: 2021-06-07 | End: 2021-06-12 | Stop reason: HOSPADM

## 2021-06-07 RX ADMIN — INSULIN LISPRO 7 UNITS: 100 INJECTION, SOLUTION INTRAVENOUS; SUBCUTANEOUS at 11:29

## 2021-06-07 RX ADMIN — ACETAMINOPHEN 650 MG: 325 TABLET ORAL at 05:03

## 2021-06-07 RX ADMIN — BACLOFEN 2.5 MG: 10 TABLET ORAL at 08:29

## 2021-06-07 RX ADMIN — GUAIFENESIN 600 MG: 600 TABLET, EXTENDED RELEASE ORAL at 17:11

## 2021-06-07 RX ADMIN — ONDANSETRON 4 MG: 2 INJECTION INTRAMUSCULAR; INTRAVENOUS at 10:47

## 2021-06-07 RX ADMIN — HUMAN INSULIN 12 UNITS: 100 INJECTION, SUSPENSION SUBCUTANEOUS at 08:29

## 2021-06-07 RX ADMIN — ASPIRIN 81 MG: 81 TABLET, CHEWABLE ORAL at 08:29

## 2021-06-07 RX ADMIN — HUMAN INSULIN 12 UNITS: 100 INJECTION, SUSPENSION SUBCUTANEOUS at 17:11

## 2021-06-07 RX ADMIN — AMLODIPINE BESYLATE 10 MG: 5 TABLET ORAL at 08:29

## 2021-06-07 RX ADMIN — INSULIN LISPRO 3 UNITS: 100 INJECTION, SOLUTION INTRAVENOUS; SUBCUTANEOUS at 22:12

## 2021-06-07 RX ADMIN — BACLOFEN 2.5 MG: 10 TABLET ORAL at 15:34

## 2021-06-07 RX ADMIN — INSULIN LISPRO 5 UNITS: 100 INJECTION, SOLUTION INTRAVENOUS; SUBCUTANEOUS at 08:30

## 2021-06-07 RX ADMIN — PANTOPRAZOLE SODIUM 40 MG: 40 TABLET, DELAYED RELEASE ORAL at 08:29

## 2021-06-07 RX ADMIN — SODIUM CHLORIDE 100 ML/HR: 9 INJECTION, SOLUTION INTRAVENOUS at 15:31

## 2021-06-07 RX ADMIN — ATORVASTATIN CALCIUM 80 MG: 40 TABLET, FILM COATED ORAL at 22:02

## 2021-06-07 RX ADMIN — CARVEDILOL 12.5 MG: 12.5 TABLET, FILM COATED ORAL at 16:16

## 2021-06-07 RX ADMIN — INSULIN LISPRO 2 UNITS: 100 INJECTION, SOLUTION INTRAVENOUS; SUBCUTANEOUS at 17:11

## 2021-06-07 RX ADMIN — FLUTICASONE PROPIONATE 2 SPRAY: 50 SPRAY, METERED NASAL at 08:31

## 2021-06-07 RX ADMIN — TICAGRELOR 30 MG: 60 TABLET ORAL at 08:29

## 2021-06-07 RX ADMIN — PANTOPRAZOLE SODIUM 40 MG: 40 TABLET, DELAYED RELEASE ORAL at 17:11

## 2021-06-07 RX ADMIN — GUAIFENESIN 600 MG: 600 TABLET, EXTENDED RELEASE ORAL at 08:29

## 2021-06-07 RX ADMIN — TICAGRELOR 30 MG: 60 TABLET ORAL at 20:39

## 2021-06-07 RX ADMIN — BACLOFEN 2.5 MG: 10 TABLET ORAL at 22:02

## 2021-06-07 RX ADMIN — CARVEDILOL 12.5 MG: 12.5 TABLET, FILM COATED ORAL at 08:29

## 2021-06-07 NOTE — PROGRESS NOTES
Problem: Mobility Impaired (Adult and Pediatric)  Goal: *Acute Goals and Plan of Care (Insert Text)  Description: FUNCTIONAL STATUS PRIOR TO ADMISSION: Patient was modified independent using a single point cane for functional mobility. HOME SUPPORT PRIOR TO ADMISSION: The patient lived with 2 sons who provided support but is left alone for prolonged periods of time. Physical Therapy Goals  Initiated 6/5/2021  1. Patient will move from supine to sit and sit to supine  in bed with modified independence within 7 day(s). 2.  Patient will transfer from bed to chair and chair to bed with modified independence using the least restrictive device within 7 day(s). 3.  Patient will ambulate with modified independence for 200 feet with the least restrictive device within 7 day(s). 4.  Patient will ascend/descend 4 stairs with bilateral handrail(s) with modified independence within 7 day(s). 5.  Patient will improve Quinonez Balance score by 7 points within 7 days. 6/7/2021 1115 by Blanca Garcia, PT, DPT  Outcome: Progressing Towards Goal  PHYSICAL THERAPY TREATMENT  Patient: Lucie Chang (77 y.o. female)  Date: 6/7/2021  Diagnosis: CVA (cerebral vascular accident) [facility]) [I63.9] <principal problem not specified>       Precautions: Fall  Chart, physical therapy assessment, plan of care and goals were reviewed. ASSESSMENT  Patient continues with skilled PT services and is progressing towards goals. Patient agreeable to intervention but presents with a flat affect and requires additional time for all aspects of mobility. Able to transfer to EOB with CGA-supervision. Initiated gait with RW but required CGA and tactile cues for hand placement to stand from bedside. Gait training x35' total using a RW ~20 and with right HHA ~15'. Gait janie slow using RW and noted narrow GEORGE. This continued with HHA with further slowing of janie and elevation of guard.      Patient is making progress towards goals but remains below her modified independent baseline following admission for an acute on chronic frontal infarct. She lives with 2 sons but she is left home alone during work hours. PT completed a CONNORS and 5 x STS on evaluation with both indicating moderate fall risk. Recommend discharge to IP rehab to restore her baseline level of function. Current Level of Function Impacting Discharge (mobility/balance): CGA-min for gait    Other factors to consider for discharge: alone during work hours         PLAN :  Patient continues to benefit from skilled intervention to address the above impairments. Continue treatment per established plan of care. to address goals. Recommendation for discharge: (in order for the patient to meet his/her long term goals)  Therapy 3 hours per day 5-7 days per week    This discharge recommendation:  Has been made in collaboration with the attending provider and/or case management    IF patient discharges home will need the following DME: to be determined (TBD)       SUBJECTIVE:   Patient stated I feel wobbbly.     OBJECTIVE DATA SUMMARY:   Critical Behavior:  Neurologic State: Alert  Orientation Level: Oriented X4  Cognition: Follows commands  Safety/Judgement: Awareness of environment  Functional Mobility Training:  Bed Mobility:  Rolling: Contact guard assistance; Additional time  Supine to Sit: Supervision; Additional time              Transfers:  Sit to Stand: Contact guard assistance (cues for safety and hand placement)  Stand to Sit: Contact guard assistance                             Balance:  Sitting: Intact  Standing: Impaired; With support  Standing - Static: Fair  Standing - Dynamic : Fair;Constant support  Ambulation/Gait Training:  Distance (ft): 35 Feet (ft)  Assistive Device: Gait belt;Walker, rolling (HHA)  Ambulation - Level of Assistance: Contact guard assistance;Minimal assistance        Gait Abnormalities: Shuffling gait; Decreased step clearance        Base of Support: Narrowed     Speed/Yuki: Slow;Pace decreased (<100 feet/min)   Narrow GEORGE and increased trunk sway. No overt left LE buckling this date but asymmetrical swing       Activity Tolerance:   Fair    After treatment patient left in no apparent distress:   Supine in bed and Call bell within reach    COMMUNICATION/COLLABORATION:   The patients plan of care was discussed with: Registered nurse.      Arianna Marquez, PT, DPT   Time Calculation: 20 mins

## 2021-06-07 NOTE — PROGRESS NOTES
[facility] Adult  Hospitalist Group                                                                                          Hospitalist Progress Note  Mary Blanco MD  Answering service: [phone] from in house phone        Date of Service:  2021  NAME:  Moe Dupree  :  1945  MRN:  [id number]      Admission Summary:   Moe Dupree is a 77 y.o. female who presents with dysarthria     History is primary obtained from the patient     Patient reports that she went to bed at baseline health at around 10:00 last night. Woke up this morning and had a headache associated with slurred speech. Patient reports that she has had a stroke in the past and has left-sided deficit. Patient came to the ER as a code stroke, patient currently reports that all her symptoms have resolved. Patient denies any other complaints or problems. Patient was requested to be admitted to the hospitalist service. Patient denies any recent falls or injuries. Interval history / Subjective: Follow up TIA, headache with slurred speech. Patient seen and examined at the bedside. Labs, images and notes reviewed  Discussed with nursing staff, orders reviewed. Plan discussed with patient/Family  Feeling well. Emotionally stable. Not tearful. Denied any concerns or overnight events. Assessment & Plan:     #Headache with slurred speech, resolved. MRI brain done on  s/o R lateral frontal acute on chronic infarction chronic small vessel ischemic changes  #Subacute cough without any sputum, better  #Right nasal stuffiness, better  #DM2 with hyperglycemia. A1c 8.2  #CKD 3, stable  #Elevated blood pressure, better with amlodipine and carvedilol, not at goal  #Anemia, acute/chronic. No baseline. #Mild thrombocytopenia  #Hypertriglyceridemia, triglycerides 534. Total cholesterol 243    -Admitted to inpatient, neuro telemetry  -Neurology on board, appreciate recommendations.   -NIS on board, appreciate recommendations. Medical management  -Dual antiplatelet regimen with aspirin and Brilinta per neurology. Aspirin 81 mg and Brilinta 30 mg twice daily with therapeutic/supratherapeutic platelet function test  -Follow MRI brain without contrast noted 6/4  -Follow echocardiogram noted 6/5  -PT/OT/ST noted. IPR recommendation on discharge  -Uptitrate Amlodipine 10mg daily  -Uptitrate carvedilol to 12.5 mg twice daily  -Close BP monitoring. Goal <140/90  -Check PCT level. <0.05. -CXR for cough, unremarkable 6/5  -Continue pantoprazole 40 mg twice daily for possible GERD as a differential of cough  -Mucinex 600 mg twice daily  -Tessalon Perles as needed  -Atorvastatin 80 mg daily at bedtime  -Continue breathing treatments: Scheduled albuterol as well as PRN for excessive cough  -Humalog sliding scale insulin with serial Accu-Cheks  -If continues to remain emotional, will consider psychiatry consult for depression assessment and management. Stable. Monitor closely. Code status: Full code  DVT prophylaxis: SCD    Care Plan discussed with: Patient/Family and Nurse  Anticipated Disposition: IPR-accepted at [facility]. Pending authorization  Anticipated Discharge: 24-48 hours     Hospital Problems  Never Reviewed        Codes Class Noted POA    CVA (cerebral vascular accident) [facility]) ICD-10-CM: I63.9  ICD-9-CM: 434.91  6/4/2021 Unknown        Intracranial atherosclerosis ICD-10-CM: W89.5  ICD-9-CM: 437.0  6/4/2021 Yes        Stenosis of right carotid artery ICD-10-CM: I65.21  ICD-9-CM: 433.10  6/4/2021 Yes        Aneurysm of middle cerebral artery ICD-10-CM: I67.1  ICD-9-CM: 437.3  6/4/2021 Yes    Overview Signed 6/5/2021  5:20 PM by Holley Armstrong NP     Left MCA aneurysm                      Review of Systems:   A comprehensive review of systems was negative except for that written in the HPI. Vital Signs:    Last 24hrs VS reviewed since prior progress note.  Most recent are:  Visit Vitals  BP (!) 162/72   Pulse 69   Temp 97.9 °F (36.6 °C)   Resp 16   Ht 5' 4\" (1.626 m)   Wt 81.8 kg (180 lb 6.4 oz)   SpO2 96%   BMI 30.97 kg/m²       No intake or output data in the 24 hours ending 06/07/21 1705     Physical Examination:     I had a face to face encounter with this patient and independently examined them on 6/7/2021 as outlined below:          General : alert x 3, awake, NAD, appears to be stated age. Comfortable   HEENT: PEERL, EOMI, moist mucus membrane, TM clear  Neck: supple,   Chest: CTAB, no RRW  CVS: S1 S2 heard,   Abd: soft/ Non tender, non distended, BS physiological,   Ext: no clubbing, no cyanosis,   Neuro/Psych: pleasant mood and affect, CN 2-12 grossly intact, sensory grossly within normal limit, Strength 4/5 in LUE and LL, 5/5 right upper and right lower extremity weakness, DTR 1+ x 4  Skin: warm          Data Review:    Review and/or order of clinical lab test  Review and/or order of tests in the radiology section of CPT  Review and/or order of tests in the medicine section of CPT    Radiology:  MRI BRAIN WO CONT    Result Date: 6/7/2021  1. Chronic right lateral frontal infarct with small if any associated acute infarction. 2. Prominent white matter changes consistent chronic small vessel ischemic change. XR CHEST PORT    Result Date: 6/5/2021  No acute process. CTA CODE NEURO HEAD AND NECK W CONT    Result Date: 6/4/2021  1. Small matched perfusion defect in the location of the patient's chronic right frontal infarct. Otherwise negative CT perfusion study, without perfusion mismatch. 2. No acute large vessel occlusion. 3. Moderate left internal carotid artery origin stenosis. Moderate to severe bilateral cavernous carotid stenoses. Irregularity of the middle cerebral arteries bilaterally without focal flow-limiting stenosis. CT CODE NEURO HEAD WO CONTRAST    Result Date: 6/4/2021  Old right frontal infarct and significant small vessel ischemic changes. No acute abnormality.      CT CODE NEURO PERF W CBF    Result Date: 6/4/2021  1. Small matched perfusion defect in the location of the patient's chronic right frontal infarct. Otherwise negative CT perfusion study, without perfusion mismatch. 2. No acute large vessel occlusion. 3. Moderate left internal carotid artery origin stenosis. Moderate to severe bilateral cavernous carotid stenoses. Irregularity of the middle cerebral arteries bilaterally without focal flow-limiting stenosis. Labs:     Recent Labs     06/07/21  0835 06/06/21  1257   WBC 6.7 6.3   HGB 10.4* 9.7*   HCT 34.0* 31.5*    145*     Recent Labs     06/07/21  0835 06/06/21  1257    137   K 3.9 4.0    107   CO2 23 23   BUN 30* 30*   CREA 2.05* 1.97*   * 289*   CA 9.6 9.3     No results for input(s): ALT, AP, TBIL, TBILI, TP, ALB, GLOB, GGT, AML, LPSE in the last 72 hours. No lab exists for component: SGOT, GPT, AMYP, HLPSE  No results for input(s): INR, PTP, APTT, INREXT, INREXT in the last 72 hours. Recent Labs     06/04/21  1803   TIBC 226*   PSAT 21      No results found for: FOL, RBCF   No results for input(s): PH, PCO2, PO2 in the last 72 hours.   Recent Labs     06/05/21  0448 06/04/21  1803   * 255*   TROIQ <0.05 <0.05     Lab Results   Component Value Date/Time    Cholesterol, total 243 (H) 06/05/2021 04:48 AM    HDL Cholesterol 37 06/05/2021 04:48 AM    LDL,Direct 82 06/05/2021 04:48 AM    LDL, calculated Not calculated due to elevated triglyceride level 06/05/2021 04:48 AM    Triglyceride 534 (H) 06/05/2021 04:48 AM    CHOL/HDL Ratio 6.6 (H) 06/05/2021 04:48 AM     Lab Results   Component Value Date/Time    Glucose (POC) 194 (H) 06/07/2021 04:37 PM    Glucose (POC) 316 (H) 06/07/2021 11:23 AM    Glucose (POC) 264 (H) 06/07/2021 07:42 AM    Glucose (POC) 229 (H) 06/06/2021 10:18 PM    Glucose (POC) 307 (H) 06/06/2021 04:32 PM     No results found for: COLOR, APPRN, SPGRU, REFSG, MARCELO, PROTU, GLUCU, KETU, BILU, UROU, DYLON, [address], [address] [place] [address], WBCU, RBCU, CASTS, UCRY      Medications Reviewed:     Current Facility-Administered Medications   Medication Dose Route Frequency    carvediloL (COREG) tablet 12.5 mg  12.5 mg Oral BID WITH MEALS    insulin NPH (NOVOLIN N, HUMULIN N) injection 12 Units  12 Units SubCUTAneous ACB&D    albuterol (PROVENTIL VENTOLIN) nebulizer solution 2.5 mg  2.5 mg Nebulization Q6H PRN    ondansetron (ZOFRAN) injection 4 mg  4 mg IntraVENous Q8H PRN    0.9% sodium chloride infusion  100 mL/hr IntraVENous CONTINUOUS    amLODIPine (NORVASC) tablet 10 mg  10 mg Oral DAILY    ticagrelor (BRILINTA) tablet 30 mg  30 mg Oral Q12H    baclofen (LIORESAL) tablet 2.5 mg  2.5 mg Oral TID    fluticasone propionate (FLONASE) 50 mcg/actuation nasal spray 2 Spray  2 Spray Both Nostrils DAILY    albuterol (PROVENTIL VENTOLIN) nebulizer solution 2.5 mg  2.5 mg Nebulization Q4H PRN    pantoprazole (PROTONIX) tablet 40 mg  40 mg Oral BID    guaiFENesin ER (MUCINEX) tablet 600 mg  600 mg Oral BID    benzonatate (TESSALON) capsule 100 mg  100 mg Oral TID PRN    glucose chewable tablet 16 g  4 Tablet Oral PRN    dextrose (D50W) injection syrg 12.5-25 g  25-50 mL IntraVENous PRN    glucagon (GLUCAGEN) injection 1 mg  1 mg IntraMUSCular PRN    insulin lispro (HUMALOG) injection   SubCUTAneous AC&HS    acetaminophen (TYLENOL) tablet 650 mg  650 mg Oral Q4H PRN    Or    acetaminophen (TYLENOL) solution 650 mg  650 mg Per NG tube Q4H PRN    Or    acetaminophen (TYLENOL) suppository 650 mg  650 mg Rectal Q4H PRN    aspirin chewable tablet 81 mg  81 mg Oral DAILY    atorvastatin (LIPITOR) tablet 80 mg  80 mg Oral QHS     ______________________________________________________________________  EXPECTED LENGTH OF STAY: 2d 0h  ACTUAL LENGTH OF STAY:          3                 Varinder Nix MD

## 2021-06-07 NOTE — DIABETES MGMT
printed for review [facility]    CLINICAL NURSE SPECIALIST CONSULT     Initial Presentation   Lainey Bravo is a 77 y.o. female who presented 6/4/21 with a HA and dysarthria. A Code S was called via EMS but on initial presentation to ED, symptoms had resolved. A CT was performed which was negative for acute process. HX: CVA, Type 2 Diabetes, CKD    DX: Acute CVA    Treatment plan     TX: CT, Neurology consultation    Hospital course   Clinical progress has been complicated by     Diabetes    Patient has known Type 2 diabetes, treated with insulin (type unknown) PTA. Family history unknown for diabetes. Admission  and A1c 8.2% indicate poor diabetes control PTA. Ambulatory blood glucose management provided by primary care provider: [facility]. Consulted by Jorge Lao MD for advanced diabetes nursing assessment and care, specifically related to   [x] Inpatient management strategy    Diabetes-related medical history  Acute complications: Acute hyperglycemia  Neurological complications  Peripheral neuropathy  Microvascular disease  Nephropathy  Macrovascular disease  Cerebral vascular accident  Other associated conditions     Depression    Diabetes medication history  Drug class Currently in use Discontinued Never used   Biguanide      DDP-4 inhibitor       Sulfonylurea  Glipizide 10mg BID    Thiazolidinedione      GLP-1 RA      SGLT-2 inhibitors      Basal insulin Insulin- type and dose unknown     Bolus insulin      Fixed Dose  Combinations        Subjective   I have had diabetes since my last son was born 27 years ago.     Patient reports a 32 year history of Type 2 Diabetes   She had a previous MCA stroke and has since been living with her son who assists her in ADLs, meals and medication administration   Is cared for at [facility]?     Patient reports the following home diabetes self-care practices:  Eating pattern  [x] Breakfast Eggs, cornned beef hash,  1/2 cup of milk and 1/2 cup of OJ  [x] Lunch  Skips  [x] Dinner  Fish  [x] Beverages Water, milk, fish  Physical activity pattern  [x] Aerobic exercise Limited due to debility  Monitoring pattern  [x] Breakfast 170  [x] Bedtime \"200s\"  Taking medications pattern  [x] Consistent administration  [x] Affordable    Social determinants of health impacting diabetes self-management practices   Struggling with anxiety and/or depression and Concerned that you need to know more about how to stay healthy with diabetes       New acute on chronic right MCA territory infarct  Triglyceride: 534On Adult consistent carbohdyrate diet (3 carb choices- 45 grams CHO)  Fasting glucose 264  Pre-prandial glucose: 229-307  13 units in the last 24 hrs  NPH 12 units BID (Advanced from 7 units BID)      Called daughter, Sylvia Byers at [phone] to confirm insulin doses. She will be returning my call. Number listed for son, Agus Wesley, [phone] has been disconnected. Objective   Physical exam  General   Neuro  Alert, oriented and in no acute distress/ill-appearing. Conversant and cooperative. Slowed speech, weak. Vital Signs   Visit Vitals  BP (!) 170/75   Pulse 78   Temp 97.9 °F (36.6 °C)   Resp 16   Ht 5' 4\" (1.626 m)   Wt 81.8 kg (180 lb 6.4 oz)   SpO2 96%   BMI 30.97 kg/m²     Skin  Warm and dry. Poor dentition. No acanthosis noted along neckline. No lipohypertrophy or lipoatrophy noted at injection sites   Heart   Regular rate and rhythm.  No murmurs, rubs or gallops  Lungs  Clear to auscultation without rales or rhonchi  Extremities No foot wounds        Laboratory      CBC W/O DIFF    Collection Time: 06/07/21  8:35 AM   Result Value Ref Range    WBC 6.7 3.6 - 11.0 K/uL    RBC 4.15 3.80 - 5.20 M/uL    HGB 10.4 (L) 11.5 - 16.0 g/dL    HCT 34.0 (L) 35.0 - 47.0 %    MCV 81.9 80.0 - 99.0 FL    MCH 25.1 (L) 26.0 - 34.0 PG    MCHC 30.6 30.0 - 36.5 g/dL    RDW 15.1 (H) 11.5 - 14.5 %    PLATELET 977 296 - 272 K/uL    MPV 12.8 8.9 - 12.9 FL    NRBC 0.0 0  WBC    ABSOLUTE NRBC 0.00 0.00 - 0.01 K/uL     No results found for this visit on 06/04/21 (from the past 12 hour(s)). BMP:   Lab Results   Component Value Date/Time     06/07/2021 08:35 AM    K 3.9 06/07/2021 08:35 AM     06/07/2021 08:35 AM    CO2 23 06/07/2021 08:35 AM    AGAP 8 06/07/2021 08:35 AM     (H) 06/07/2021 08:35 AM    BUN 30 (H) 06/07/2021 08:35 AM    CREA 2.05 (H) 06/07/2021 08:35 AM    GFRAA 29 (L) 06/07/2021 08:35 AM    GFRNA 24 (L) 06/07/2021 08:35 AM        Factors impacting BG management  Factor Dose Comments   Nutrition:  Carb-controlled meals     45 grams/meal        Blood glucose pattern        Assessment and Plan   Nursing Diagnosis Risk for unstable blood glucose pattern   Nursing Intervention Domain 5250 Decision-making Support   Nursing Interventions Examined current inpatient diabetes control   Explored factors facilitating and impeding inpatient management  Identified self-management practices impeding diabetes control  Explored corrective strategies with patient and responsible inpatient provider   Informed patient of rational for insulin strategy while hospitalized     Evaluation   Marcela Jc is a 77year old female, with Type 2 diabetes, who was admitted with an acute on chronic MCA infarct. She did not achieve diabetes control prior to admission, as evidenced by admission BG of 310 and A1c of 8.2%. During this hospitalization, the patient has not achieved inpatient blood glucose target of 180 on low dose NPH and correctional humalog. Several factors have played a role in blood glucose management including:  [x] Critical nature of illness state  [x]  Changing nutritive sources & needs   [x]  Kidney dysfunction    The Subcutaneous Insulin Order set (1281) has been in use.  Hence, the next step in optimizing blood glucose control would be    [x]  Optimize basal insulin dosing   [x]  Add mealtime insulin    Based on patient's debility, glucose goal closer to a glucose of 180 with A1C of 7.5-8% to prevent hypoglycemia. Recommendations   1. POC glucose ACHS    2. Noted 45 gram CHO/meal, ok to advance to 60 grams cho/meal if hungry of needing calories    3. Continue the Subcutaneous Insulin Order set (6987)  Insulin Dosing Specific recommendation   Basal                                      (Based on weight, BMI & GFR) [x]0.3 units/kg/D: 12 units NPH BID      If eating more than 50% of meals and experiencing pre-prandial hyperglycemia, add Nutritional          (Based on CHO/dextrose load) Add low dose bolus:   start with 4 units Humalog/meal    Corrective                                       (Useful in adjusting insulin dosing) [x] Normal sensitivity: ACHS        Discharge Planning   1. Continue glucose monitoring before meals    2. Insulin therapy TBD. Billing Code(s)   [x] 30629    Before making these care recommendations, I personally reviewed the hosptialization record, including laboratory and diagnostic data, medications and examined the patient at bedside (circumstances permitting).   Total minutes: 45 mins    LONNIE Leblanc  Diabetes Clinical Nurse Specialist  [facility]  Access via [place]

## 2021-06-07 NOTE — PROGRESS NOTES
Problem: Self Care Deficits Care Plan (Adult)  Goal: *Acute Goals and Plan of Care (Insert Text)  Description:   FUNCTIONAL STATUS PRIOR TO ADMISSION: Patient was modified independent using a single point cane for functional mobility, largely Mod I for basic and some instrumental ADLs with inconsistent family assist.     HOME SUPPORT: The patient lived with her sons who work during the day leaving her alone for prolonged periods of time. Occupational Therapy Goals  Initiated 6/5/2021  1. Patient will perform grooming at the sink with supervision/set-up within 7 day(s). 2.  Patient will perform bathing with supervision/set-up within 7 day(s). 3.  Patient will perform upper and lower body dressing with supervision/set-up within 7 day(s). 4.  Patient will perform toilet transfers with supervision/set-up within 7 day(s). 5.  Patient will perform all aspects of toileting with supervision/set-up within 7 day(s). 6.  Patient will participate in upper extremity therapeutic exercise/activities with supervision/set-up for 10 minutes within 7 day(s). 7.  Patient will utilize energy conservation techniques during functional activities with verbal and visual cues within 7 day(s). Outcome: Progressing Towards Goal     OCCUPATIONAL THERAPY TREATMENT  Patient: Juan Pereyra (77 y.o. female)  Date: 6/7/2021  Diagnosis: CVA (cerebral vascular accident) [facility]) [I63.9] <principal problem not specified>       Precautions: Fall  Chart, occupational therapy assessment, plan of care, and goals were reviewed. ASSESSMENT  Patient continues with skilled OT services and is progressing towards goals. She declined OT except repositioning through amb up side of bed to bed perseverating on not feeling well and \"something is wrong with her head. \" She expressed anxiety with understanding everything that is going on as she cannot read. Continue to recommend rehab at discharge.     Current Level of Function Impacting Discharge (ADLs): min A    Other factors to consider for discharge: see above         PLAN :  Patient continues to benefit from skilled intervention to address the above impairments. Continue treatment per established plan of care to address goals. Recommend with staff: up to chair for all meals and bathroom for toileting    Recommend next OT session: standing ADLs    Recommendation for discharge: (in order for the patient to meet his/her long term goals)  Therapy 3 hours per day 5-7 days per week    This discharge recommendation:  Has been made in collaboration with the attending provider and/or case management    IF patient discharges home will need the following DME: TBD       SUBJECTIVE:   Patient stated I just don't understand what is going on.    OBJECTIVE DATA SUMMARY:   Cognitive/Behavioral Status:  Neurologic State: Alert  Orientation Level: Oriented X4  Cognition: Decreased attention/concentration; Follows commands (pt does not read and requesting education on dx)  Perception: Appears intact  Perseveration: Perseverates during ADLS;Perseverates during conversation (Re: not feeling well and cannot read)  Safety/Judgement: Awareness of environment;Decreased awareness of need for assistance;Decreased awareness of need for safety;Decreased insight into deficits; Fall prevention    Functional Mobility and Transfers for ADLs:  Bed Mobility:  Rolling: Contact guard assistance; Additional time  Supine to Sit: Supervision; Additional time    Transfers:  Sit to Stand: Contact guard assistance (cues for safety and hand placement)          Balance:  Sitting: Intact  Standing: Impaired; With support  Standing - Static: Fair  Standing - Dynamic : Fair;Constant support    ADL Intervention:  Cognitive Retraining  Safety/Judgement: Awareness of environment;Decreased awareness of need for assistance;Decreased awareness of need for safety;Decreased insight into deficits; Fall prevention      Pain:  0/10    Activity Tolerance: Fair    After treatment patient left in no apparent distress:   Supine in bed, Call bell within reach, and Bed / chair alarm activated    COMMUNICATION/COLLABORATION:   The patients plan of care was discussed with: Physical therapist and Registered nurse. Patient was educated regarding Her deficit(s) of impaired strength and balance as this relates to Her diagnosis of L MCA. She demonstrated Guarded understanding as evidenced by some awareness of situation. Patient and/or family was verbally educated on the BE FAST acronym for signs/symptoms of CVA and TIA. BE FAST was written on patient's communication board  for visual education and reinforcement. All questions answered with patient indicating fair understanding.      Jolly Tan OT  Time Calculation: 11 mins

## 2021-06-07 NOTE — PROGRESS NOTES
Problem: Falls - Risk of  Goal: *Absence of Falls  Description: Document Jarad Pandya Fall Risk and appropriate interventions in the flowsheet.   Outcome: Progressing Towards Goal  Note: Fall Risk Interventions:  Mobility Interventions: Bed/chair exit alarm, Communicate number of staff needed for ambulation/transfer, OT consult for ADLs, Patient to call before getting OOB, PT Consult for mobility concerns, PT Consult for assist device competence, Utilize walker, cane, or other assistive device         Medication Interventions: Patient to call before getting OOB, Teach patient to arise slowly    Elimination Interventions: Call light in reach, Patient to call for help with toileting needs    History of Falls Interventions: Bed/chair exit alarm, Consult care management for discharge planning, Door open when patient unattended, Assess for delayed presentation/identification of injury for 48 hrs (comment for end date)         Problem: Breathing Pattern - Ineffective  Goal: *Absence of hypoxia  Outcome: Progressing Towards Goal

## 2021-06-07 NOTE — PROGRESS NOTES
SPEECH LANGUAGE PATHOLOGY EVALUATION/DISCHARGE  Patient: Yoel Casper (77 y.o. female)  Date: 6/7/2021  Primary Diagnosis: CVA (cerebral vascular accident) [facility]) [I63.9]       Precautions:   Fall    ASSESSMENT :  Based on the objective data described below, the patient presents with intelligible speech while speaking full sentences in conversation. Rate of speech is slow with low volume however patient reports this is baseline. Patient is able to express basic wants and needs. STAND completed with no difficulty noted and diet initiated. Patient eating breakfast without difficulty during assessment. Patient reports no difficulty swallow just that the food has no taste. Full dysphagia evaluation not warranted. Skilled acute therapy provided by a speech-language pathologist is not indicated at this time. PLAN :  Recommendations:  No further SLP treatment warranted. Discharge Recommendations: None     SUBJECTIVE:   Patient up in chair for breakfast.  She is somewhat tearful during assessment. RN approved visit. OBJECTIVE:   No past medical history on file. No past surgical history on file.   Prior Level of Function/Home Situation:   Home Situation  Home Environment: Private residence  # Steps to Enter: 4  Rails to Enter: Yes  Hand Rails : Bilateral  One/Two Story Residence: One story  Living Alone: No  Support Systems: Family member(s), Friends \ neighbors  Patient Expects to be Discharged to[de-identified] [employer]  Current DME Used/Available at Home: Rain Casey, straight, Walker, rolling, Shower chair  Tub or Shower Type: Tub/Shower combination  Mental Status:  Neurologic State: Alert  Orientation Level: Oriented X4  Cognition: Follows commands  Perception: Appears intact  Perseveration: No perseveration noted  Safety/Judgement: Awareness of environment  Motor Speech:  Oral-Motor Structure/Motor Speech  Labial: No impairment  Dentition: Limited;Natural;Poor  Oral Hygiene: Moist and clean oral mucosa  Lingual: No impairment  Velum: Unable to visualize  Mandible: No impairment  Apraxic Characteristics: None  Dysarthric Characteristics: Decreased rate  Intelligibility: No impairment  Overall Impairment Severity: None    NOMS: The NOMS functional outcome measure was used to quantify this patient's level of motor speech impairment. Based on the NOMS, the patient was determined to be at level 6 for motor speech function. NOMS Motor Speech:  Level 1 (CN):  100% unintelligible  Level 2 (CM):  Communication partner responsible for message; can do CV or automatic words w/ max cues but rarely intelligible in context  Level 3 (CL): communication partner primarily responsible for message but says CV/automatic words intelligibly; mod cues to say simple words/phrases  Level 4 (CK): In structured conversation with familiar listener can say simple words and phrases. Mod cues for simple sentences  Level 5 (CJ):  Uses simple sentences for ADLs with familiar and unfamiliar listener; min cues for complex sentences  Level 6 (CI):  Intelligible in ADLs; difficulty in voc/social activites; rare cues for complex message; uses comp strategies  Level 7 ([address] [place] [address]):  Intelligible in all activities. May occasionally use compensatory strategies. ZACHARY. (2003). National Outcomes Measurement System (NOMS): Adult Speech-Language Pathology User's Guide. After treatment:   Patient left in no apparent distress sitting up in chair, Call bell within reach and Nursing notified    COMMUNICATION/EDUCATION:   Patient was educated regarding role of SLP, diet and POC. Patient verbalized understanding. The patient's plan of care including recommendations, planned interventions, and recommended diet changes were discussed with: Registered nurse.        Thank you for this referral.  Eugenio Chong, SLP  Time Calculation: 15 mins

## 2021-06-07 NOTE — PROGRESS NOTES
Neurology Progress Note    Patient ID:  Zita Gastelum  [id number]  51 y.o.  1945    Chief Complain    Subjective:        67-year-old woman who has a new acute on chronic right MCA territory infarct. P2Y12 Suprat at 55. She is not as tearful today. She is alert and orient     Objective:    All records in [facility] reviewed and noted    ROS:  Per HPI  All other 12 pt ROS negative    Meds:  Current Facility-Administered Medications   Medication Dose Route Frequency    carvediloL (COREG) tablet 12.5 mg  12.5 mg Oral BID WITH MEALS    insulin NPH (NOVOLIN N, HUMULIN N) injection 12 Units  12 Units SubCUTAneous ACB&D    albuterol (PROVENTIL VENTOLIN) nebulizer solution 2.5 mg  2.5 mg Nebulization Q6H PRN    ondansetron (ZOFRAN) injection 4 mg  4 mg IntraVENous Q8H PRN    0.9% sodium chloride infusion  100 mL/hr IntraVENous CONTINUOUS    amLODIPine (NORVASC) tablet 10 mg  10 mg Oral DAILY    ticagrelor (BRILINTA) tablet 30 mg  30 mg Oral Q12H    baclofen (LIORESAL) tablet 2.5 mg  2.5 mg Oral TID    fluticasone propionate (FLONASE) 50 mcg/actuation nasal spray 2 Spray  2 Spray Both Nostrils DAILY    albuterol (PROVENTIL VENTOLIN) nebulizer solution 2.5 mg  2.5 mg Nebulization Q4H PRN    pantoprazole (PROTONIX) tablet 40 mg  40 mg Oral BID    guaiFENesin ER (MUCINEX) tablet 600 mg  600 mg Oral BID    benzonatate (TESSALON) capsule 100 mg  100 mg Oral TID PRN    glucose chewable tablet 16 g  4 Tablet Oral PRN    dextrose (D50W) injection syrg 12.5-25 g  25-50 mL IntraVENous PRN    glucagon (GLUCAGEN) injection 1 mg  1 mg IntraMUSCular PRN    insulin lispro (HUMALOG) injection   SubCUTAneous AC&HS    acetaminophen (TYLENOL) tablet 650 mg  650 mg Oral Q4H PRN    Or    acetaminophen (TYLENOL) solution 650 mg  650 mg Per NG tube Q4H PRN    Or    acetaminophen (TYLENOL) suppository 650 mg  650 mg Rectal Q4H PRN    aspirin chewable tablet 81 mg  81 mg Oral DAILY    atorvastatin (LIPITOR) tablet 80 mg  80 mg Oral QHS       Imaging:  MRI Results (most recent):  Results from Hospital Encounter encounter on 06/04/21    MRI BRAIN WO CONT    Narrative  PRELIMINARY REPORT:  Encephalomalacia right frontal lobe. Areas of hemosiderin deposition compatible  with chronic hemorrhage. An acute hemorrhage is not identified. There are linear  areas of restricted diffusion demonstrating decreased signal on ADC concerning  for acute on chronic infarct. Extensive white matter changes with punctate hemosiderin deposition in the basal  ganglia  Preliminary report was provided by Dr. Deane Severance, the on-call radiologist, at 2104  Final report to follow. *FINAL REPORT BELOW*    *FINAL REPORT BELO*  EXAM:  MRI BRAIN WO CONT  INDICATION:  CVA, patient with previous history of stroke brought by EMS severe  headache and slurred speech. TECHNIQUE:  Sagittal T1, axial FLAIR, T2,T1 and gradient echo images as well as coronal T2  weighted images and axial diffusion weighted images of the head were obtained. COMPARISON:  CT, CTA  FINDINGS:  Noted above there is encephalomalacia related to previous infarction right  anterior lateral frontal lobe. Small areas of hemosiderin as well as foci of T1  shortening in the cortex consistent with mineralization and/or minimal residual  hemorrhagic products consistent with subacute/chronic infarct. Areas of T1  shortening also have restricted diffusion most likely due to subacute infarct  and susceptibility and does not necessarily represent superimposed acute  infarction. Punctate foci of restricted diffusion superior to this are  indeterminate and may represent minimal area of acute infarction. There is extensive and confluent T2 hyperintensity in the cerebral white matter,  basal ganglia and also involvement of the brainstem in a pattern consistent with  chronic small vessel ischemic change.  Additionally, punctate foci of hemosiderin  deposition in some of the basal ganglia and pattern also suggesting chronic  small vessel disease possibly on the basis of chronic hypertension. No evidence of acute intracranial hemorrhage, mass or abnormal extra-axial fluid  collections. Flow voids are present in vertebral basilar and carotid artery  systems. Small amount of fluid dependent right maxillary sinus. Other structures and  skull base are unremarkable. Impression  1. Chronic right lateral frontal infarct with small if any associated acute  infarction. 2. Prominent white matter changes consistent chronic small vessel ischemic  change.       Lab Review   Recent Results (from the past 24 hour(s))   GLUCOSE, POC    Collection Time: 06/06/21  4:32 PM   Result Value Ref Range    Glucose (POC) 307 (H) 65 - 117 mg/dL    Performed by Sammie Sierra    GLUCOSE, POC    Collection Time: 06/06/21 10:18 PM   Result Value Ref Range    Glucose (POC) 229 (H) 65 - 117 mg/dL    Performed by Jose Angel Benjamin    GLUCOSE, POC    Collection Time: 06/07/21  7:42 AM   Result Value Ref Range    Glucose (POC) 264 (H) 65 - 117 mg/dL    Performed by Aura Gutierrez    CBC W/O DIFF    Collection Time: 06/07/21  8:35 AM   Result Value Ref Range    WBC 6.7 3.6 - 11.0 K/uL    RBC 4.15 3.80 - 5.20 M/uL    HGB 10.4 (L) 11.5 - 16.0 g/dL    HCT 34.0 (L) 35.0 - 47.0 %    MCV 81.9 80.0 - 99.0 FL    MCH 25.1 (L) 26.0 - 34.0 PG    MCHC 30.6 30.0 - 36.5 g/dL    RDW 15.1 (H) 11.5 - 14.5 %    PLATELET 468 681 - 842 K/uL    MPV 12.8 8.9 - 12.9 FL    NRBC 0.0 0  WBC    ABSOLUTE NRBC 0.00 0.00 - 0.73 K/uL   METABOLIC PANEL, BASIC    Collection Time: 06/07/21  8:35 AM   Result Value Ref Range    Sodium 138 136 - 145 mmol/L    Potassium 3.9 3.5 - 5.1 mmol/L    Chloride 107 97 - 108 mmol/L    CO2 23 21 - 32 mmol/L    Anion gap 8 5 - 15 mmol/L    Glucose 290 (H) 65 - 100 mg/dL    BUN 30 (H) 6 - 20 MG/DL    Creatinine 2.05 (H) 0.55 - 1.02 MG/DL    BUN/Creatinine ratio 15 12 - 20      GFR est AA 29 (L) >60 ml/min/1.73m2    GFR est non-AA 24 (L) >60 ml/min/1.73m2 Calcium 9.6 8.5 - 10.1 MG/DL   GLUCOSE, POC    Collection Time: 06/07/21 11:23 AM   Result Value Ref Range    Glucose (POC) 316 (H) 65 - 117 mg/dL    Performed by Kaylyn Castellon        Exam:  Visit Vitals  BP (!) 170/75   Pulse 78   Temp 97.9 °F (36.6 °C)   Resp 16   Ht 5' 4\" (1.626 m)   Wt 180 lb 6.4 oz (81.8 kg)   SpO2 96%   BMI 30.97 kg/m²     Gen: Well developed  CV: RRR  Lungs: non labored breathing  Abd: non distending  Neuro: A&O x 3, no dysarthria or aphasia  CN II-XII: PERRL, EOMI, face symmetric, tongue/palate midline  Motor: strength 5/5 all four ext  Sensory: intact to LT  Gait: normal    Assessment:     Patient Active Problem List   Diagnosis Code    CVA (cerebral vascular accident) ([facility] [address] I63.9    Intracranial atherosclerosis I67.2    Stenosis of right carotid artery I65.21    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 Moderate to severebilateral cavernous carotid stenoses. Irregularity of the middle cerebral arteries bilaterally without focal flow-limiting stenosis. (No target for neurointervention. Recommend medical management per Neurology.  Agree with dual anti-platelet therapy and high intensity statin.)  -Continue Brilinta and ASA 81 mg  At discharge   -LDL 82, goal is less than 70, Continue Lipitor 80 mg daily   -echo  -continue PT/OT/SLP  -stroke education        Signed:  Araseli Vásquez NP  6/7/2021  2:40 PM

## 2021-06-07 NOTE — PROGRESS NOTES
ADULT PROTOCOL: JET AEROSOL ASSESSMENT    Patient  Jung Piña     77 y.o.   female     6/7/2021  7:12 AM    Breath Sounds Pre Procedure: Right Breath Sounds: Upper, Expiratory wheezing (mild )                               Left Breath Sounds: Diminished, Expiratory wheezing (mild)    Breath Sounds Post Procedure: Right Breath Sounds: Diminished                                 Left Breath Sounds: Diminished    Breathing pattern: Pre procedure Breathing Pattern: Regular, Shallow          Post procedure Breathing Pattern: Regular    Heart Rate: Pre procedure Pulse: 79           Post procedure      Resp Rate: Pre procedure Respirations: 10           Post procedure Respirations: 17    Peak Flow: Pre bronchodilator             Post bronchodilator       FVC/FEV1:      Incentive Spirometry:             Cough: Pre procedure Cough: Non-productive               Post procedure Cough: Non-productive    Suctioned: NO    Sputum: Pre procedure                   Post procedure      Oxygen: O2 Device: None (Room air)        Changed: NO    SpO2: Pre procedure SpO2: 90 %   without oxygen              Post procedure SpO2: 99 %  without oxygen    Nebulizer Therapy: Current medications Aerosolized Medications: Albuterol      Changed: NO      Problem List:   Patient Active Problem List   Diagnosis Code    [facility]    Intracranial atherosclerosis I67.2    Stenosis of right carotid artery I65.21    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

## 2021-06-07 NOTE — PROGRESS NOTES
Transition of Care Plan   RUR- Low 17%   DISPOSITION: IPR - Referrals sent to [address] [place] [address] and [facility]   F/U with PCP/Specialist     Transport: SARINA HARMON is reviewing the patient's information; clinical liaison is Geovanna Ricardo [phone] . Patient will need an insurance authorization. 9:38am: [facility] unable to accept patient because she was evaluated as modified independent. CM left message for Liberty Waters at [address] [place] [address]. CM to discuss SNF choice with patient if [facility] does not accept. 10:49am: [facility] requested updated PT/OT notes. CM informed therapy team.    2:10pm: Patient has been medically accepted at [address] [place] [address]; Harry Jiménez started today, 6/7. [email or web address].

## 2021-06-07 NOTE — PROGRESS NOTES
1600: Bedside and Verbal shift change report given to Gera Gore, ELIDA and Chel Marina RN(oncoming nurse) by Simona Staley RN (offgoing nurse). Report included the following information SBAR, Kardex, Intake/Output, MAR, Recent Results, Med Rec Status, Cardiac Rhythm SR and Dual Neuro Assessment. 2015: Bedside and Verbal shift change report given to Olu summers RN (oncoming nurse) by Gera Gore RN (offgoing nurse). Report included the following information SBAR, Kardex, Intake/Output, MAR, Recent Results, Med Rec Status, Cardiac Rhythm SR and Dual Neuro Assessment. I agree with Jeannine Schreiber RN's documentation as her preceptor. Problem: Diabetes Self-Management  Goal: *Disease process and treatment process  Description: Define diabetes and identify own type of diabetes; list 3 options for treating diabetes. Outcome: Progressing Towards Goal  Goal: *Incorporating nutritional management into lifestyle  Description: Describe effect of type, amount and timing of food on blood glucose; list 3 methods for planning meals. Outcome: Progressing Towards Goal  Goal: *Incorporating physical activity into lifestyle  Description: State effect of exercise on blood glucose levels. Outcome: Progressing Towards Goal  Goal: *Developing strategies to promote health/change behavior  Description: Define the ABC's of diabetes; identify appropriate screenings, schedule and personal plan for screenings. Outcome: Progressing Towards Goal  Goal: *Using medications safely  Description: State effect of diabetes medications on diabetes; name diabetes medication taking, action and side effects. Outcome: Progressing Towards Goal  Goal: *Monitoring blood glucose, interpreting and using results  Description: Identify recommended blood glucose targets  and personal targets.   Outcome: Progressing Towards Goal  Goal: *Prevention, detection, treatment of acute complications  Description: List symptoms of hyper- and hypoglycemia; describe how to treat low blood sugar and actions for lowering  high blood glucose level. Outcome: Progressing Towards Goal  Goal: *Prevention, detection and treatment of chronic complications  Description: Define the natural course of diabetes and describe the relationship of blood glucose levels to long term complications of diabetes.   Outcome: Progressing Towards Goal  Goal: *Developing strategies to address psychosocial issues  Description: Describe feelings about living with diabetes; identify support needed and support network  Outcome: Progressing Towards Goal  Goal: *Insulin pump training  Outcome: Progressing Towards Goal  Goal: *Sick day guidelines  Outcome: Progressing Towards Goal  Goal: *Patient Specific Goal (EDIT GOAL, INSERT TEXT)  Outcome: Progressing Towards Goal     Problem: TIA/CVA Stroke: Day 2 Until Discharge  Goal: Activity/Safety  Outcome: Progressing Towards Goal  Goal: Diagnostic Test/Procedures  Outcome: Progressing Towards Goal  Goal: Nutrition/Diet  Outcome: Progressing Towards Goal  Goal: Discharge Planning  Outcome: Progressing Towards Goal  Goal: Medications  Outcome: Progressing Towards Goal  Goal: Respiratory  Outcome: Progressing Towards Goal  Goal: Treatments/Interventions/Procedures  Outcome: Progressing Towards Goal  Goal: Psychosocial  Outcome: Progressing Towards Goal  Goal: *Verbalizes anxiety and depression are reduced or absent  Outcome: Progressing Towards Goal  Goal: *Absence of aspiration  Outcome: Progressing Towards Goal  Goal: *Absence of deep venous thrombosis signs and symptoms(Stroke Metric)  Outcome: Progressing Towards Goal  Goal: *Optimal pain control at patient's stated goal  Outcome: Progressing Towards Goal  Goal: *Tolerating diet  Outcome: Progressing Towards Goal  Goal: *Ability to perform ADLs and demonstrates progressive mobility and function  Outcome: Progressing Towards Goal  Goal: *Stroke education continued(Stroke Metric)  Outcome: Progressing Towards Goal

## 2021-06-08 ENCOUNTER — APPOINTMENT (OUTPATIENT)
Dept: ULTRASOUND IMAGING | Age: 76
DRG: 065 | End: 2021-06-08
Attending: INTERNAL MEDICINE
Payer: MEDICARE

## 2021-06-08 LAB
ALBUMIN SERPL-MCNC: 3.7 G/DL (ref 3.5–5)
ALBUMIN/GLOB SERPL: 1 {RATIO} (ref 1.1–2.2)
ALP SERPL-CCNC: 107 U/L (ref 45–117)
ALT SERPL-CCNC: 17 U/L (ref 12–78)
ANION GAP SERPL CALC-SCNC: 6 MMOL/L (ref 5–15)
AST SERPL-CCNC: 18 U/L (ref 15–37)
BILIRUB SERPL-MCNC: 0.3 MG/DL (ref 0.2–1)
BUN SERPL-MCNC: 36 MG/DL (ref 6–20)
BUN/CREAT SERPL: 17 (ref 12–20)
CALCIUM SERPL-MCNC: 9 MG/DL (ref 8.5–10.1)
CHLORIDE SERPL-SCNC: 110 MMOL/L (ref 97–108)
CO2 SERPL-SCNC: 22 MMOL/L (ref 21–32)
COMMENT, HOLDF: NORMAL
COVID-19 RAPID TEST, COVR: NOT DETECTED
CREAT SERPL-MCNC: 2.13 MG/DL (ref 0.55–1.02)
ERYTHROCYTE [DISTWIDTH] IN BLOOD BY AUTOMATED COUNT: 15.3 % (ref 11.5–14.5)
GLOBULIN SER CALC-MCNC: 3.7 G/DL (ref 2–4)
GLUCOSE BLD STRIP.AUTO-MCNC: 128 MG/DL (ref 65–117)
GLUCOSE BLD STRIP.AUTO-MCNC: 171 MG/DL (ref 65–117)
GLUCOSE BLD STRIP.AUTO-MCNC: 217 MG/DL (ref 65–117)
GLUCOSE BLD STRIP.AUTO-MCNC: 225 MG/DL (ref 65–117)
GLUCOSE BLD STRIP.AUTO-MCNC: 297 MG/DL (ref 65–117)
GLUCOSE SERPL-MCNC: 296 MG/DL (ref 65–100)
HCT VFR BLD AUTO: 31.9 % (ref 35–47)
HGB BLD-MCNC: 9.8 G/DL (ref 11.5–16)
MCH RBC QN AUTO: 25.3 PG (ref 26–34)
MCHC RBC AUTO-ENTMCNC: 30.7 G/DL (ref 30–36.5)
MCV RBC AUTO: 82.4 FL (ref 80–99)
NRBC # BLD: 0 K/UL (ref 0–0.01)
NRBC BLD-RTO: 0 PER 100 WBC
PLATELET # BLD AUTO: 154 K/UL (ref 150–400)
PMV BLD AUTO: 12.6 FL (ref 8.9–12.9)
POTASSIUM SERPL-SCNC: 4.3 MMOL/L (ref 3.5–5.1)
PROT SERPL-MCNC: 7.4 G/DL (ref 6.4–8.2)
RBC # BLD AUTO: 3.87 M/UL (ref 3.8–5.2)
SAMPLES BEING HELD,HOLD: NORMAL
SERVICE CMNT-IMP: ABNORMAL
SODIUM SERPL-SCNC: 138 MMOL/L (ref 136–145)
SOURCE, COVRS: NORMAL
WBC # BLD AUTO: 6.9 K/UL (ref 3.6–11)

## 2021-06-08 PROCEDURE — 84300 ASSAY OF URINE SODIUM: CPT

## 2021-06-08 PROCEDURE — 74011636637 HC RX REV CODE- 636/637: Performed by: INTERNAL MEDICINE

## 2021-06-08 PROCEDURE — 97535 SELF CARE MNGMENT TRAINING: CPT

## 2021-06-08 PROCEDURE — 65660000000 HC RM CCU STEPDOWN

## 2021-06-08 PROCEDURE — 85027 COMPLETE CBC AUTOMATED: CPT

## 2021-06-08 PROCEDURE — 97530 THERAPEUTIC ACTIVITIES: CPT

## 2021-06-08 PROCEDURE — 74011250637 HC RX REV CODE- 250/637: Performed by: FAMILY MEDICINE

## 2021-06-08 PROCEDURE — 81001 URINALYSIS AUTO W/SCOPE: CPT

## 2021-06-08 PROCEDURE — 87635 SARS-COV-2 COVID-19 AMP PRB: CPT

## 2021-06-08 PROCEDURE — 36415 COLL VENOUS BLD VENIPUNCTURE: CPT

## 2021-06-08 PROCEDURE — 80053 COMPREHEN METABOLIC PANEL: CPT

## 2021-06-08 PROCEDURE — 76770 US EXAM ABDO BACK WALL COMP: CPT

## 2021-06-08 PROCEDURE — 82570 ASSAY OF URINE CREATININE: CPT

## 2021-06-08 PROCEDURE — 74011250637 HC RX REV CODE- 250/637: Performed by: INTERNAL MEDICINE

## 2021-06-08 PROCEDURE — 82962 GLUCOSE BLOOD TEST: CPT

## 2021-06-08 PROCEDURE — 99231 SBSQ HOSP IP/OBS SF/LOW 25: CPT | Performed by: CLINICAL NURSE SPECIALIST

## 2021-06-08 PROCEDURE — 74011250636 HC RX REV CODE- 250/636: Performed by: INTERNAL MEDICINE

## 2021-06-08 PROCEDURE — 74011250637 HC RX REV CODE- 250/637: Performed by: PSYCHIATRY & NEUROLOGY

## 2021-06-08 PROCEDURE — 97116 GAIT TRAINING THERAPY: CPT

## 2021-06-08 RX ORDER — INSULIN LISPRO 100 [IU]/ML
4 INJECTION, SOLUTION INTRAVENOUS; SUBCUTANEOUS
Status: DISCONTINUED | OUTPATIENT
Start: 2021-06-08 | End: 2021-06-12 | Stop reason: HOSPADM

## 2021-06-08 RX ORDER — HYDRALAZINE HYDROCHLORIDE 10 MG/1
10 TABLET, FILM COATED ORAL 3 TIMES DAILY
Status: DISCONTINUED | OUTPATIENT
Start: 2021-06-08 | End: 2021-06-09

## 2021-06-08 RX ORDER — AMLODIPINE BESYLATE 5 MG/1
5 TABLET ORAL 2 TIMES DAILY
Status: DISCONTINUED | OUTPATIENT
Start: 2021-06-08 | End: 2021-06-12 | Stop reason: HOSPADM

## 2021-06-08 RX ADMIN — BACLOFEN 2.5 MG: 10 TABLET ORAL at 21:23

## 2021-06-08 RX ADMIN — CARVEDILOL 12.5 MG: 12.5 TABLET, FILM COATED ORAL at 09:08

## 2021-06-08 RX ADMIN — BENZONATATE 100 MG: 100 CAPSULE ORAL at 09:10

## 2021-06-08 RX ADMIN — HYDRALAZINE HYDROCHLORIDE 10 MG: 10 TABLET, FILM COATED ORAL at 09:08

## 2021-06-08 RX ADMIN — AMLODIPINE BESYLATE 5 MG: 5 TABLET ORAL at 17:56

## 2021-06-08 RX ADMIN — ATORVASTATIN CALCIUM 80 MG: 40 TABLET, FILM COATED ORAL at 21:23

## 2021-06-08 RX ADMIN — HUMAN INSULIN 12 UNITS: 100 INJECTION, SUSPENSION SUBCUTANEOUS at 09:03

## 2021-06-08 RX ADMIN — TICAGRELOR 30 MG: 60 TABLET ORAL at 21:23

## 2021-06-08 RX ADMIN — ONDANSETRON 4 MG: 2 INJECTION INTRAMUSCULAR; INTRAVENOUS at 09:00

## 2021-06-08 RX ADMIN — PANTOPRAZOLE SODIUM 40 MG: 40 TABLET, DELAYED RELEASE ORAL at 09:08

## 2021-06-08 RX ADMIN — HYDRALAZINE HYDROCHLORIDE 10 MG: 10 TABLET, FILM COATED ORAL at 21:23

## 2021-06-08 RX ADMIN — INSULIN LISPRO 4 UNITS: 100 INJECTION, SOLUTION INTRAVENOUS; SUBCUTANEOUS at 12:17

## 2021-06-08 RX ADMIN — GUAIFENESIN 600 MG: 600 TABLET, EXTENDED RELEASE ORAL at 09:10

## 2021-06-08 RX ADMIN — GUAIFENESIN 600 MG: 600 TABLET, EXTENDED RELEASE ORAL at 17:56

## 2021-06-08 RX ADMIN — AMLODIPINE BESYLATE 5 MG: 5 TABLET ORAL at 09:09

## 2021-06-08 RX ADMIN — INSULIN LISPRO 4 UNITS: 100 INJECTION, SOLUTION INTRAVENOUS; SUBCUTANEOUS at 09:02

## 2021-06-08 RX ADMIN — TICAGRELOR 30 MG: 60 TABLET ORAL at 09:09

## 2021-06-08 RX ADMIN — PANTOPRAZOLE SODIUM 40 MG: 40 TABLET, DELAYED RELEASE ORAL at 17:55

## 2021-06-08 RX ADMIN — INSULIN LISPRO 4 UNITS: 100 INJECTION, SOLUTION INTRAVENOUS; SUBCUTANEOUS at 17:54

## 2021-06-08 RX ADMIN — HYDRALAZINE HYDROCHLORIDE 10 MG: 10 TABLET, FILM COATED ORAL at 17:56

## 2021-06-08 RX ADMIN — HUMAN INSULIN 12 UNITS: 100 INJECTION, SUSPENSION SUBCUTANEOUS at 17:54

## 2021-06-08 RX ADMIN — INSULIN LISPRO 9 UNITS: 100 INJECTION, SOLUTION INTRAVENOUS; SUBCUTANEOUS at 12:18

## 2021-06-08 RX ADMIN — SODIUM CHLORIDE 100 ML/HR: 9 INJECTION, SOLUTION INTRAVENOUS at 06:29

## 2021-06-08 RX ADMIN — CARVEDILOL 12.5 MG: 12.5 TABLET, FILM COATED ORAL at 17:56

## 2021-06-08 RX ADMIN — BENZONATATE 100 MG: 100 CAPSULE ORAL at 17:56

## 2021-06-08 RX ADMIN — ASPIRIN 81 MG: 81 TABLET, CHEWABLE ORAL at 09:09

## 2021-06-08 RX ADMIN — BACLOFEN 2.5 MG: 10 TABLET ORAL at 09:10

## 2021-06-08 RX ADMIN — FLUTICASONE PROPIONATE 2 SPRAY: 50 SPRAY, METERED NASAL at 09:12

## 2021-06-08 RX ADMIN — INSULIN LISPRO 3 UNITS: 100 INJECTION, SOLUTION INTRAVENOUS; SUBCUTANEOUS at 09:03

## 2021-06-08 RX ADMIN — BACLOFEN 2.5 MG: 10 TABLET ORAL at 17:55

## 2021-06-08 NOTE — PROGRESS NOTES
Problem: Mobility Impaired (Adult and Pediatric)  Goal: *Acute Goals and Plan of Care (Insert Text)  Description: FUNCTIONAL STATUS PRIOR TO ADMISSION: Patient was modified independent using a single point cane for functional mobility. HOME SUPPORT PRIOR TO ADMISSION: The patient lived with 2 sons who provided support but is left alone for prolonged periods of time. Physical Therapy Goals  Initiated 6/5/2021  1. Patient will move from supine to sit and sit to supine  in bed with modified independence within 7 day(s). 2.  Patient will transfer from bed to chair and chair to bed with modified independence using the least restrictive device within 7 day(s). 3.  Patient will ambulate with modified independence for 200 feet with the least restrictive device within 7 day(s). 4.  Patient will ascend/descend 4 stairs with bilateral handrail(s) with modified independence within 7 day(s). 5.  Patient will improve Quinonez Balance score by 7 points within 7 days. Outcome: Progressing Towards Goal   PHYSICAL THERAPY TREATMENT  Patient: Veronica Singer (77 y.o. female)  Date: 6/8/2021  Diagnosis: CVA (cerebral vascular accident) [facility]) [I63.9] <principal problem not specified>       Precautions: Fall  Chart, physical therapy assessment, plan of care and goals were reviewed. ASSESSMENT  Patient continues with skilled PT services and is progressing towards goals. Pt emotionally labile throughout session with several crying spells. Continues to requires Min A for safe functional transfers and initiation of gait training. Maintains narrow GEORGE despite verbal cueing. Improved RW management with only cueing to maintain close proximity with turns. Fatigues after 30ft and requests to return to room. Up in chair with all needs in reach at end of session. DBP elevated and RN aware. Pt reports she is concerned about the amount of support she would need if discharged home.  As she is alone during the day    Current Level of Function Impacting Discharge (mobility/balance): Min A transfers and gait training    Other factors to consider for discharge: utilized [facility] P[facility], lives with son who works during the day         PLAN :  Patient continues to benefit from skilled intervention to address the above impairments. Continue treatment per established plan of care. to address goals. Recommendation for discharge: (in order for the patient to meet his/her long term goals)  Therapy 3 hours per day 5-7 days per week vs SNF if denied IPR, pt could discharge home with HHPT if family can increase supervision    This discharge recommendation:  Has been made in collaboration with the attending provider and/or case management    IF patient discharges home will need the following DME: rolling walker       SUBJECTIVE:   Patient stated I just want to go home to my son.     OBJECTIVE DATA SUMMARY:   Critical Behavior:  Neurologic State: Alert  Orientation Level: Oriented to person, Oriented to place  Cognition: Follows commands, Decreased attention/concentration  Safety/Judgement: Fall prevention  Functional Mobility Training:  Bed Mobility:  Rolling: Contact guard assistance  Supine to Sit: Contact guard assistance              Transfers:  Sit to Stand: Minimum assistance;Contact guard assistance  Stand to Sit: Contact guard assistance                             Balance:  Sitting: Intact  Standing: Impaired  Standing - Static: Fair;Constant support  Standing - Dynamic : Fair  Ambulation/Gait Training:  Distance (ft): 65 Feet (ft)  Assistive Device: Gait belt;Walker, rolling  Ambulation - Level of Assistance: Contact guard assistance;Minimal assistance        Gait Abnormalities: Shuffling gait; Decreased step clearance        Base of Support: Narrowed     Speed/Yuki: Slow;Pace decreased (<100 feet/min)  Step Length: Left shortened;Right shortened               Activity Tolerance:   Good and requires rest breaks    After treatment patient left in no apparent distress:   Sitting in chair, Call bell within reach, and Bed / chair alarm activated    COMMUNICATION/COLLABORATION:   The patients plan of care was discussed with: Registered nurse.      Nazanin Guadalupe, PT   Time Calculation: 30 mins

## 2021-06-08 NOTE — PROGRESS NOTES
[facility] Adult  Hospitalist Group                                                                                          Hospitalist Progress Note  Noa Garcia MD  Answering service: [phone] from in house phone        Date of Service:  2021  NAME:  Amish Arambula  :  1945  MRN:  [id number]      Admission Summary:   Amish Arambula is a 77 y.o. female who presents with dysarthria     History is primary obtained from the patient     Patient reports that she went to bed at baseline health at around 10:00 last night. Woke up this morning and had a headache associated with slurred speech. Patient reports that she has had a stroke in the past and has left-sided deficit. Patient came to the ER as a code stroke, patient currently reports that all her symptoms have resolved. Patient denies any other complaints or problems. Patient was requested to be admitted to the hospitalist service. Patient denies any recent falls or injuries. Interval history / Subjective: Follow up TIA, headache with slurred speech. Patient seen and examined at the bedside. Labs, images and notes reviewed  Discussed with nursing staff, orders reviewed. Plan discussed with patient/Family  Feeling well. No overnight events. No other complaints  Creatinine up trended . Received IV fluids overnight as per the plan. Met with sons at bedside and updated. Answered all their concerns and questions. Assessment & Plan:     #Headache with slurred speech, resolved. MRI brain done on  s/o R lateral frontal acute on chronic infarction chronic small vessel ischemic changes  #Subacute cough without any sputum, better  #Right nasal stuffiness, better  #DM2 with hyperglycemia. A1c 8.2  #Mild DERREK on CKD 3, creatinine up trended 2.05 on . Awaiting  BMP/CMP  #Elevated blood pressure, better with amlodipine and carvedilol, better with wide variation  #Anemia, acute/chronic. No baseline.   #Mild thrombocytopenia  #Hypertriglyceridemia, triglycerides 534. Total cholesterol 243    -Admitted to inpatient, neuro telemetry  -Neurology on board, appreciate recommendations. -NIS on board, appreciate recommendations. Medical management  -Dual antiplatelet regimen with aspirin and Brilinta per neurology. Aspirin 81 mg and Brilinta 30 mg twice daily with therapeutic/supratherapeutic platelet function test  -Follow MRI brain without contrast noted 6/4  -Follow echocardiogram noted 6/5  -PT/OT/ST noted. IPR recommendation on discharge. Accepted at [address] amlodipine from 10 mg daily to 5 mg twice daily  -Continue carvedilol to 12.5 mg twice daily  -Add hydralazine 10 mg 3 times daily  -Close BP monitoring. Goal <140/90. Hopefully with less wide variation during the day  -Check PCT level. <0.05. -CXR for cough, unremarkable 6/5  -Continue pantoprazole 40 mg twice daily for possible GERD as a differential of cough  -Mucinex 600 mg twice daily  -Tessalon Perles as needed  -Atorvastatin 80 mg daily at bedtime  -Continue breathing treatments: Scheduled albuterol as well as PRN for excessive cough  -Humalog sliding scale insulin with serial Accu-Cheks  -If continues to remain emotional, will consider psychiatry consult for depression assessment and management. Has remained stable. Monitor closely. Code status: Full code  DVT prophylaxis: SCD    Care Plan discussed with: Patient/Family and Nurse  Anticipated Disposition: IPR-accepted at [address].   Pending authorization-awaiting 6/7  Anticipated Discharge: 24-48 hours     Hospital Problems  Never Reviewed        Codes Class Noted POA    CVA (cerebral vascular accident) [facility]) ICD-10-CM: I63.9  ICD-9-CM: 434.91  6/4/2021 Unknown        Intracranial atherosclerosis ICD-10-CM: L53.2  ICD-9-CM: 437.0  6/4/2021 Yes        Stenosis of right carotid artery ICD-10-CM: I65.21  ICD-9-CM: 433.10  6/4/2021 Yes        Aneurysm of middle cerebral artery ICD-10-CM: I67.1  ICD-9-CM: 437.3  6/4/2021 Yes    Overview Signed 6/5/2021  5:20 PM by Ej Roberts NP     Left MCA aneurysm                      Review of Systems:   A comprehensive review of systems was negative except for that written in the HPI. Vital Signs:    Last 24hrs VS reviewed since prior progress note. Most recent are:  Visit Vitals  BP (!) 155/68 (BP 1 Location: Left upper arm, BP Patient Position: At rest)   Pulse 72   Temp 98.2 °F (36.8 °C)   Resp 13   Ht 5' 4\" (1.626 m)   Wt 82.8 kg (182 lb 9.6 oz)   SpO2 100%   BMI 31.34 kg/m²         Intake/Output Summary (Last 24 hours) at 6/8/2021 0820  Last data filed at 6/8/2021 0400  Gross per 24 hour   Intake 1320 ml   Output --   Net 1320 ml        Physical Examination:     I had a face to face encounter with this patient and independently examined them on 6/8/2021 as outlined below: Unchanged from previous day          General : alert x 3, awake, NAD, appears to be stated age. Comfortable  HEENT: PEERL, EOMI, moist mucus membrane, TM clear  Neck: supple, no carotid bruits  Chest: Clear to auscultation bilaterally, no RRW  CVS: S1 S2 heard, no MRG  Abd: soft/ Non tender, non distended, BS physiological,   Ext: no clubbing, no cyanosis,   Neuro/Psych: pleasant mood and affect, CN 2-12 grossly intact, sensory grossly within normal limit, Strength 4/5 in LUE and LL, 5/5 right upper and right lower extremity weakness, DTR 1+ x 4  Skin: warm          Data Review:    Review and/or order of clinical lab test  Review and/or order of tests in the radiology section of CPT  Review and/or order of tests in the medicine section of CPT    Radiology:  MRI BRAIN WO CONT    Result Date: 6/7/2021  1. Chronic right lateral frontal infarct with small if any associated acute infarction. 2. Prominent white matter changes consistent chronic small vessel ischemic change. XR CHEST PORT    Result Date: 6/5/2021  No acute process.      CTA CODE NEURO HEAD AND NECK W CONT    Result Date: 6/4/2021  1. Small matched perfusion defect in the location of the patient's chronic right frontal infarct. Otherwise negative CT perfusion study, without perfusion mismatch. 2. No acute large vessel occlusion. 3. Moderate left internal carotid artery origin stenosis. Moderate to severe bilateral cavernous carotid stenoses. Irregularity of the middle cerebral arteries bilaterally without focal flow-limiting stenosis. CT CODE NEURO HEAD WO CONTRAST    Result Date: 6/4/2021  Old right frontal infarct and significant small vessel ischemic changes. No acute abnormality. CT CODE NEURO PERF W CBF    Result Date: 6/4/2021  1. Small matched perfusion defect in the location of the patient's chronic right frontal infarct. Otherwise negative CT perfusion study, without perfusion mismatch. 2. No acute large vessel occlusion. 3. Moderate left internal carotid artery origin stenosis. Moderate to severe bilateral cavernous carotid stenoses. Irregularity of the middle cerebral arteries bilaterally without focal flow-limiting stenosis. Labs:     Recent Labs     06/07/21  0835 06/06/21  1257   WBC 6.7 6.3   HGB 10.4* 9.7*   HCT 34.0* 31.5*    145*     Recent Labs     06/07/21  0835 06/06/21  1257    137   K 3.9 4.0    107   CO2 23 23   BUN 30* 30*   CREA 2.05* 1.97*   * 289*   CA 9.6 9.3     No results for input(s): ALT, AP, TBIL, TBILI, TP, ALB, GLOB, GGT, AML, LPSE in the last 72 hours. No lab exists for component: SGOT, GPT, AMYP, HLPSE  No results for input(s): INR, PTP, APTT, INREXT, INREXT in the last 72 hours. No results for input(s): FE, TIBC, PSAT, FERR in the last 72 hours. No results found for: FOL, RBCF   No results for input(s): PH, PCO2, PO2 in the last 72 hours. No results for input(s): CPK, CKNDX, TROIQ in the last 72 hours.     No lab exists for component: CPKMB  Lab Results   Component Value Date/Time    Cholesterol, total 243 (H) 06/05/2021 04:48 AM HDL Cholesterol 37 06/05/2021 04:48 AM    LDL,Direct 82 06/05/2021 04:48 AM    LDL, calculated Not calculated due to elevated triglyceride level 06/05/2021 04:48 AM    Triglyceride 534 (H) 06/05/2021 04:48 AM    CHOL/HDL Ratio 6.6 (H) 06/05/2021 04:48 AM     Lab Results   Component Value Date/Time    Glucose (POC) 217 (H) 06/08/2021 07:18 AM    Glucose (POC) 225 (H) 06/08/2021 06:32 AM    Glucose (POC) 250 (H) 06/07/2021 10:06 PM    Glucose (POC) 194 (H) 06/07/2021 04:37 PM    Glucose (POC) 316 (H) 06/07/2021 11:23 AM     No results found for: COLOR, APPRN, SPGRU, REFSG, MARCELO, PROTU, GLUCU, KETU, BILU, UROU, DYLON, LEUKU, GLUKE, EPSU, BACTU, WBCU, RBCU, CASTS, UCRY      Medications Reviewed:     Current Facility-Administered Medications   Medication Dose Route Frequency    insulin lispro (HUMALOG) injection 4 Units  4 Units SubCUTAneous TID WITH MEALS    amLODIPine (NORVASC) tablet 5 mg  5 mg Oral BID    hydrALAZINE (APRESOLINE) tablet 10 mg  10 mg Oral TID    carvediloL (COREG) tablet 12.5 mg  12.5 mg Oral BID WITH MEALS    insulin NPH (NOVOLIN N, HUMULIN N) injection 12 Units  12 Units SubCUTAneous ACB&D    albuterol (PROVENTIL VENTOLIN) nebulizer solution 2.5 mg  2.5 mg Nebulization Q6H PRN    ondansetron (ZOFRAN) injection 4 mg  4 mg IntraVENous Q8H PRN    0.9% sodium chloride infusion  100 mL/hr IntraVENous CONTINUOUS    ticagrelor (BRILINTA) tablet 30 mg  30 mg Oral Q12H    baclofen (LIORESAL) tablet 2.5 mg  2.5 mg Oral TID    fluticasone propionate (FLONASE) 50 mcg/actuation nasal spray 2 Spray  2 Spray Both Nostrils DAILY    albuterol (PROVENTIL VENTOLIN) nebulizer solution 2.5 mg  2.5 mg Nebulization Q4H PRN    pantoprazole (PROTONIX) tablet 40 mg  40 mg Oral BID    guaiFENesin ER (MUCINEX) tablet 600 mg  600 mg Oral BID    benzonatate (TESSALON) capsule 100 mg  100 mg Oral TID PRN    glucose chewable tablet 16 g  4 Tablet Oral PRN    dextrose (D50W) injection syrg 12.5-25 g  25-50 mL IntraVENous PRN    glucagon (GLUCAGEN) injection 1 mg  1 mg IntraMUSCular PRN    insulin lispro (HUMALOG) injection   SubCUTAneous AC&HS    acetaminophen (TYLENOL) tablet 650 mg  650 mg Oral Q4H PRN    Or    acetaminophen (TYLENOL) solution 650 mg  650 mg Per NG tube Q4H PRN    Or    acetaminophen (TYLENOL) suppository 650 mg  650 mg Rectal Q4H PRN    aspirin chewable tablet 81 mg  81 mg Oral DAILY    atorvastatin (LIPITOR) tablet 80 mg  80 mg Oral QHS     ______________________________________________________________________  EXPECTED LENGTH OF STAY: 2d 0h  ACTUAL LENGTH OF STAY:          4                 Almas Corbett MD

## 2021-06-08 NOTE — PROGRESS NOTES
Transition of Care Plan   RUR- Low 18%   DISPOSITION: CJW pending insurance appeal     F/U with PCP/Specialist     Transport: SARINA NORMAN received call from [facility] conduct [facility]2P with Dr. Kevin Lewis. P2P scheduled for 2:30pm today, 6/8.    3:22pm: CM spoke with Dr. Kevin Lewis regarding P2P; [address] likely to deny patient. CM spoke with patient at bedside about appeal option vs. Support at home. Patient does not feel like she has adequate support to return home; has one son that is supportive but works all day. Patient would like to appeal insurance auth - CM faxed appeal paper to [employer] at [address] to start process. 4:08pm: If IPR appeal does not go through, SNF choice is [email or web address].

## 2021-06-08 NOTE — PROGRESS NOTES
Problem: Falls - Risk of  Goal: *Absence of Falls  Description: Document Vain Leary Fall Risk and appropriate interventions in the flowsheet.   Outcome: Progressing Towards Goal  Note: Fall Risk Interventions:  Mobility Interventions: Assess mobility with egress test, Bed/chair exit alarm, Communicate number of staff needed for ambulation/transfer, OT consult for ADLs, Patient to call before getting OOB, PT Consult for mobility concerns, PT Consult for assist device competence, Strengthening exercises (ROM-active/passive), Utilize walker, cane, or other assistive device, Utilize gait belt for transfers/ambulation    Mentation Interventions: Adequate sleep, hydration, pain control, Bed/chair exit alarm, Door open when patient unattended, Evaluate medications/consider consulting pharmacy, Familiar objects from home, Gait belt with transfers/ambulation, Increase mobility, More frequent rounding, Reorient patient, Toileting rounds, Update white board    Medication Interventions: Assess postural VS orthostatic hypotension, Bed/chair exit alarm, Evaluate medications/consider consulting pharmacy, Patient to call before getting OOB, Teach patient to arise slowly, Utilize gait belt for transfers/ambulation    Elimination Interventions: Bed/chair exit alarm, Call light in reach, Elevated toilet seat, Patient to call for help with toileting needs, Stay With Me (per policy), Toilet paper/wipes in reach, Toileting schedule/hourly rounds    History of Falls Interventions: Bed/chair exit alarm, Consult care management for discharge planning, Door open when patient unattended, Evaluate medications/consider consulting pharmacy, Utilize gait belt for transfer/ambulation, Assess for delayed presentation/identification of injury for 48 hrs (comment for end date), Vital signs minimum Q4HRs X 24 hrs (comment for end date)         Problem: Patient Education: Go to Patient Education Activity  Goal: Patient/Family Education  Outcome: Progressing Towards Goal     Problem: Discharge Planning  Goal: *Discharge to safe environment  Outcome: Progressing Towards Goal  Goal: *Knowledge of medication management  Outcome: Progressing Towards Goal  Goal: *Knowledge of discharge instructions  Outcome: Progressing Towards Goal     Problem: Patient Education: Go to Patient Education Activity  Goal: Patient/Family Education  Outcome: Progressing Towards Goal     Problem: TIA/CVA Stroke: Day 2 Until Discharge  Goal: Activity/Safety  Outcome: Progressing Towards Goal  Goal: Diagnostic Test/Procedures  Outcome: Progressing Towards Goal  Goal: Nutrition/Diet  Outcome: Progressing Towards Goal  Goal: Discharge Planning  Outcome: Progressing Towards Goal  Goal: Medications  Outcome: Progressing Towards Goal  Goal: Respiratory  Outcome: Progressing Towards Goal  Goal: Treatments/Interventions/Procedures  Outcome: Progressing Towards Goal  Goal: Psychosocial  Outcome: Progressing Towards Goal  Goal: *Verbalizes anxiety and depression are reduced or absent  Outcome: Progressing Towards Goal  Goal: *Absence of aspiration  Outcome: Progressing Towards Goal  Goal: *Absence of deep venous thrombosis signs and symptoms(Stroke Metric)  Outcome: Progressing Towards Goal  Goal: *Optimal pain control at patient's stated goal  Outcome: Progressing Towards Goal  Goal: *Tolerating diet  Outcome: Progressing Towards Goal  Goal: *Ability to perform ADLs and demonstrates progressive mobility and function  Outcome: Progressing Towards Goal  Goal: *Stroke education continued(Stroke Metric)  Outcome: Progressing Towards Goal

## 2021-06-08 NOTE — PROGRESS NOTES
Bedside and Verbal shift change report given to ELIDA Castellanos (oncoming nurse) by Sony Calvert (offgoing nurse). Report included the following information SBAR, Kardex, Intake/Output, MAR, Recent Results, Cardiac Rhythm NSR and Dual Neuro Assessment.

## 2021-06-08 NOTE — DIABETES MGMT
[id number] [facility]    CLINICAL NURSE SPECIALIST CONSULT   FOLLOW UP NOTE    Initial Presentation   Trevor Perry is a 77 y.o. female who presented 6/4/21 with a HA and dysarthria. A Code S was called via EMS but on initial presentation to ED, symptoms had resolved. A CT was performed which was negative for acute process. HX: CVA, Type 2 Diabetes, CKD    DX: Acute CVA    Treatment plan     TX: CT, Neurology consultation    Hospital course   Clinical progress has been complicated by     Diabetes    Patient has known Type 2 diabetes, treated with insulin (type unknown) PTA. Family history unknown for diabetes. Admission  and A1c 8.2% indicate poor diabetes control PTA. Ambulatory blood glucose management provided by primary care provider: Tiff[facility] Garza. Consulted by Demarcus Murray MD for advanced diabetes nursing assessment and care, specifically related to   [x] Inpatient management strategy    Diabetes-related medical history  Acute complications: Acute hyperglycemia  Neurological complications  Peripheral neuropathy  Microvascular disease  Nephropathy  Macrovascular disease  Cerebral vascular accident  Other associated conditions     Depression    Diabetes medication history  Drug class Currently in use Discontinued Never used   Biguanide      DDP-4 inhibitor       Sulfonylurea  Glipizide 10mg BID    Thiazolidinedione      GLP-1 RA      SGLT-2 inhibitors      Basal insulin Novolin N  20 units BID     Bolus insulin      Fixed Dose  Combinations        Subjective   I am dizzy today    Patient reports a 32 year history of Type 2 Diabetes   She had a previous MCA stroke and has since been living with her son who assists her in ADLs, meals and medication administration   Is cared for at [facility]?     Patient reports the following home diabetes self-care practices:  Eating pattern  [x] Breakfast Eggs, cornned beef hash,  1/2 cup of milk and 1/2 cup of OJ  [x] Lunch  Skips  [x] Dinner  Fish  [x] Beverages Water, milk, fish  Physical activity pattern  [x] Aerobic exercise Limited due to debility  Monitoring pattern  [x] Breakfast 170  [x] Bedtime \"200s\"  Taking medications pattern  [x] Consistent administration  [x] Affordable    Social determinants of health impacting diabetes self-management practices   Struggling with anxiety and/or depression and Concerned that you need to know more about how to stay healthy with diabetes       New acute on chronic right MCA territory infarct  Triglyceride: 534 On Adult consistent carbohdyrate diet (3 carb choices- 45 grams CHO)  Fasting glucose 217  Pre-prandial glucose: 194-250  15 units in the last 24 hrs  NPH 12 units BID  Just added 4 units Humalog/meal      Called daughter, Saintclair Oms at [phone] to confirm insulin doses. She did not return my call and did not answer when attempted to call today. Left voice message for her to return my. Number listed for sonSonido, [phone] has been disconnected. Objective   Physical exam  General   Neuro  Alert, oriented and in no acute distress/ill-appearing. Conversant and cooperative. Slowed speech, weak. Vital Signs   Visit Vitals  BP (!) 152/70 (BP 1 Location: Left upper arm, BP Patient Position: At rest;Sitting)   Pulse 72   Temp 98.2 °F (36.8 °C)   Resp 12   Ht 5' 4\" (1.626 m)   Wt 82.8 kg (182 lb 9.6 oz)   SpO2 93%   BMI 31.34 kg/m²     Skin  Warm and dry. Poor dentition. No acanthosis noted along neckline. No lipohypertrophy or lipoatrophy noted at injection sites   Heart   Regular rate and rhythm. No murmurs, rubs or gallops  Lungs  Clear to auscultation without rales or rhonchi  Extremities No foot wounds        Laboratory  No results found for this visit on 06/04/21 (from the past 12 hour(s)). No results found for this visit on 06/04/21 (from the past 12 hour(s)).   BMP:   No results found for: NA, K, CL, CO2, AGAP, GLU, BUN, CREA, GFRAA, GFRNA     Factors impacting BG management  Factor Dose Comments   Nutrition:  Carb-controlled meals     45 grams/meal        Blood glucose pattern        Assessment and Plan   Nursing Diagnosis Risk for unstable blood glucose pattern   Nursing Intervention Domain 0770 Decision-making Support   Nursing Interventions Examined current inpatient diabetes control   Explored factors facilitating and impeding inpatient management  Identified self-management practices impeding diabetes control  Explored corrective strategies with patient and responsible inpatient provider   Informed patient of rational for insulin strategy while hospitalized     Evaluation   Amy Goldman is a 77year old female, with Type 2 diabetes, who was admitted with an acute on chronic MCA infarct. She did not achieve diabetes control prior to admission, as evidenced by admission BG of 310 and A1c of 8.2%. During this hospitalization, the patient has not achieved inpatient blood glucose target of 180 on NPH and correctional humalog. Several factors have played a role in blood glucose management including:  [x] Critical nature of illness state  [x]  Changing nutritive sources & needs   [x]  Kidney dysfunction    The Subcutaneous Insulin Order set (3981) has been in use. Hence, the next step in optimizing blood glucose control would be    [x]  Optimize basal insulin dosing   [x]  Add mealtime insulin    Based on patient's debility, glucose goal closer to a glucose of 180 with A1C of 7.5-8% to prevent hypoglycemia. Recommendations   1. POC glucose ACHS    2. Noted 45 gram CHO/meal, ok to advance to 60 grams cho/meal if hungry of needing calories    3.  Continue the Subcutaneous Insulin Order set (3624)  Insulin Dosing Specific recommendation   Basal                                      (Based on weight, BMI & GFR) [x]0.3 units/kg/D: 12 units NPH BID      Add Nutritional            (Based on CHO/dextrose load) Start with 4 units Humalog/meal    Corrective (Useful in adjusting insulin dosing) [x] Normal sensitivity: ACHS        Discharge Planning   1. Continue glucose monitoring before meals    2. Recommend adjusting insulin to 70/30 insulin: 25 units at breakfast and 15 units at dinner       3. Please follow up with PCP on discharge for insulin adjustments. Billing Code(s)   [x] 09679    Before making these care recommendations, I personally reviewed the hosptialization record, including laboratory and diagnostic data, medications and examined the patient at bedside (circumstances permitting).   Total minutes: 15 mins    Corine Moreno CNS  Diabetes Clinical Nurse Specialist  [facility]  Access via [facility]

## 2021-06-08 NOTE — PROGRESS NOTES
Bedside shift change report given to Misha Lee RN (oncoming nurse) by Javad Jimenez RN (offgoing nurse). Report included the following information SBAR, Kardex, Recent Results, Cardiac Rhythm NSR and Dual Neuro Assessment.

## 2021-06-09 LAB
ALBUMIN SERPL-MCNC: 3.5 G/DL (ref 3.5–5)
ALBUMIN/GLOB SERPL: 0.9 {RATIO} (ref 1.1–2.2)
ALP SERPL-CCNC: 104 U/L (ref 45–117)
ALT SERPL-CCNC: 18 U/L (ref 12–78)
ANION GAP SERPL CALC-SCNC: 9 MMOL/L (ref 5–15)
APPEARANCE UR: CLEAR
AST SERPL-CCNC: 17 U/L (ref 15–37)
BACTERIA URNS QL MICRO: NEGATIVE /HPF
BILIRUB SERPL-MCNC: 0.4 MG/DL (ref 0.2–1)
BILIRUB UR QL: NEGATIVE
BUN SERPL-MCNC: 32 MG/DL (ref 6–20)
BUN/CREAT SERPL: 18 (ref 12–20)
CALCIUM SERPL-MCNC: 9.2 MG/DL (ref 8.5–10.1)
CHLORIDE SERPL-SCNC: 111 MMOL/L (ref 97–108)
CHOLEST SERPL-MCNC: 229 MG/DL
CO2 SERPL-SCNC: 20 MMOL/L (ref 21–32)
COLOR UR: ABNORMAL
CREAT SERPL-MCNC: 1.75 MG/DL (ref 0.55–1.02)
CREAT UR-MCNC: 77.5 MG/DL
EPITH CASTS URNS QL MICRO: ABNORMAL /LPF
GLOBULIN SER CALC-MCNC: 3.8 G/DL (ref 2–4)
GLUCOSE BLD STRIP.AUTO-MCNC: 139 MG/DL (ref 65–117)
GLUCOSE BLD STRIP.AUTO-MCNC: 174 MG/DL (ref 65–117)
GLUCOSE BLD STRIP.AUTO-MCNC: 191 MG/DL (ref 65–117)
GLUCOSE BLD STRIP.AUTO-MCNC: 208 MG/DL (ref 65–117)
GLUCOSE SERPL-MCNC: 193 MG/DL (ref 65–100)
GLUCOSE UR STRIP.AUTO-MCNC: NEGATIVE MG/DL
HDLC SERPL-MCNC: 33 MG/DL
HDLC SERPL: 6.9 {RATIO} (ref 0–5)
HGB UR QL STRIP: NEGATIVE
HYALINE CASTS URNS QL MICRO: ABNORMAL /LPF (ref 0–5)
KETONES UR QL STRIP.AUTO: NEGATIVE MG/DL
LDLC SERPL CALC-MCNC: 124 MG/DL (ref 0–100)
LEUKOCYTE ESTERASE UR QL STRIP.AUTO: NEGATIVE
NITRITE UR QL STRIP.AUTO: NEGATIVE
PH UR STRIP: 5 [PH] (ref 5–8)
POTASSIUM SERPL-SCNC: 3.9 MMOL/L (ref 3.5–5.1)
PROT SERPL-MCNC: 7.3 G/DL (ref 6.4–8.2)
PROT UR STRIP-MCNC: 30 MG/DL
RBC #/AREA URNS HPF: ABNORMAL /HPF (ref 0–5)
SERVICE CMNT-IMP: ABNORMAL
SODIUM SERPL-SCNC: 140 MMOL/L (ref 136–145)
SODIUM UR-SCNC: 47 MMOL/L
SP GR UR REFRACTOMETRY: 1.01 (ref 1–1.03)
TRIGL SERPL-MCNC: 360 MG/DL (ref ?–150)
UA: UC IF INDICATED,UAUC: ABNORMAL
UROBILINOGEN UR QL STRIP.AUTO: 0.2 EU/DL (ref 0.2–1)
VLDLC SERPL CALC-MCNC: 72 MG/DL
WBC URNS QL MICRO: ABNORMAL /HPF (ref 0–4)

## 2021-06-09 PROCEDURE — 74011000250 HC RX REV CODE- 250: Performed by: INTERNAL MEDICINE

## 2021-06-09 PROCEDURE — 80061 LIPID PANEL: CPT

## 2021-06-09 PROCEDURE — 82962 GLUCOSE BLOOD TEST: CPT

## 2021-06-09 PROCEDURE — 97116 GAIT TRAINING THERAPY: CPT

## 2021-06-09 PROCEDURE — 74011250637 HC RX REV CODE- 250/637: Performed by: INTERNAL MEDICINE

## 2021-06-09 PROCEDURE — 74011636637 HC RX REV CODE- 636/637: Performed by: INTERNAL MEDICINE

## 2021-06-09 PROCEDURE — 74011250636 HC RX REV CODE- 250/636: Performed by: INTERNAL MEDICINE

## 2021-06-09 PROCEDURE — 36415 COLL VENOUS BLD VENIPUNCTURE: CPT

## 2021-06-09 PROCEDURE — 80053 COMPREHEN METABOLIC PANEL: CPT

## 2021-06-09 PROCEDURE — 74011250637 HC RX REV CODE- 250/637: Performed by: PSYCHIATRY & NEUROLOGY

## 2021-06-09 PROCEDURE — 74011250637 HC RX REV CODE- 250/637: Performed by: FAMILY MEDICINE

## 2021-06-09 PROCEDURE — 65660000000 HC RM CCU STEPDOWN

## 2021-06-09 PROCEDURE — 97530 THERAPEUTIC ACTIVITIES: CPT

## 2021-06-09 RX ORDER — POLYETHYLENE GLYCOL 3350 17 G/17G
17 POWDER, FOR SOLUTION ORAL DAILY
Status: DISCONTINUED | OUTPATIENT
Start: 2021-06-09 | End: 2021-06-12 | Stop reason: HOSPADM

## 2021-06-09 RX ORDER — FACIAL-BODY WIPES
10 EACH TOPICAL DAILY PRN
Status: DISCONTINUED | OUTPATIENT
Start: 2021-06-09 | End: 2021-06-12 | Stop reason: HOSPADM

## 2021-06-09 RX ORDER — DOCUSATE SODIUM 100 MG/1
100 CAPSULE, LIQUID FILLED ORAL DAILY
Status: DISCONTINUED | OUTPATIENT
Start: 2021-06-09 | End: 2021-06-10

## 2021-06-09 RX ORDER — HYDRALAZINE HYDROCHLORIDE 25 MG/1
25 TABLET, FILM COATED ORAL 3 TIMES DAILY
Status: DISCONTINUED | OUTPATIENT
Start: 2021-06-09 | End: 2021-06-12 | Stop reason: HOSPADM

## 2021-06-09 RX ADMIN — ONDANSETRON 4 MG: 2 INJECTION INTRAMUSCULAR; INTRAVENOUS at 09:02

## 2021-06-09 RX ADMIN — TICAGRELOR 30 MG: 60 TABLET ORAL at 22:08

## 2021-06-09 RX ADMIN — SODIUM CHLORIDE 100 ML/HR: 9 INJECTION, SOLUTION INTRAVENOUS at 10:50

## 2021-06-09 RX ADMIN — CARVEDILOL 12.5 MG: 12.5 TABLET, FILM COATED ORAL at 08:53

## 2021-06-09 RX ADMIN — HYDRALAZINE HYDROCHLORIDE 25 MG: 25 TABLET, FILM COATED ORAL at 22:17

## 2021-06-09 RX ADMIN — AMLODIPINE BESYLATE 5 MG: 5 TABLET ORAL at 08:52

## 2021-06-09 RX ADMIN — GUAIFENESIN 600 MG: 600 TABLET, EXTENDED RELEASE ORAL at 08:54

## 2021-06-09 RX ADMIN — PANTOPRAZOLE SODIUM 40 MG: 40 TABLET, DELAYED RELEASE ORAL at 18:30

## 2021-06-09 RX ADMIN — BACLOFEN 2.5 MG: 10 TABLET ORAL at 18:30

## 2021-06-09 RX ADMIN — GUAIFENESIN 600 MG: 600 TABLET, EXTENDED RELEASE ORAL at 18:29

## 2021-06-09 RX ADMIN — HUMAN INSULIN 2 UNITS: 100 INJECTION, SUSPENSION SUBCUTANEOUS at 10:44

## 2021-06-09 RX ADMIN — INSULIN LISPRO 3 UNITS: 100 INJECTION, SOLUTION INTRAVENOUS; SUBCUTANEOUS at 12:07

## 2021-06-09 RX ADMIN — CARVEDILOL 12.5 MG: 12.5 TABLET, FILM COATED ORAL at 18:29

## 2021-06-09 RX ADMIN — INSULIN LISPRO 4 UNITS: 100 INJECTION, SOLUTION INTRAVENOUS; SUBCUTANEOUS at 12:06

## 2021-06-09 RX ADMIN — BACLOFEN 2.5 MG: 10 TABLET ORAL at 08:55

## 2021-06-09 RX ADMIN — HUMAN INSULIN 12 UNITS: 100 INJECTION, SUSPENSION SUBCUTANEOUS at 08:49

## 2021-06-09 RX ADMIN — INSULIN LISPRO 4 UNITS: 100 INJECTION, SOLUTION INTRAVENOUS; SUBCUTANEOUS at 08:48

## 2021-06-09 RX ADMIN — HUMAN INSULIN 14 UNITS: 100 INJECTION, SUSPENSION SUBCUTANEOUS at 18:38

## 2021-06-09 RX ADMIN — HYDRALAZINE HYDROCHLORIDE 10 MG: 10 TABLET, FILM COATED ORAL at 08:54

## 2021-06-09 RX ADMIN — INSULIN LISPRO 4 UNITS: 100 INJECTION, SOLUTION INTRAVENOUS; SUBCUTANEOUS at 18:38

## 2021-06-09 RX ADMIN — ASPIRIN 81 MG: 81 TABLET, CHEWABLE ORAL at 08:53

## 2021-06-09 RX ADMIN — SODIUM CHLORIDE 100 ML/HR: 9 INJECTION, SOLUTION INTRAVENOUS at 00:18

## 2021-06-09 RX ADMIN — AMLODIPINE BESYLATE 5 MG: 5 TABLET ORAL at 18:29

## 2021-06-09 RX ADMIN — ALBUTEROL SULFATE 2.5 MG: 2.5 SOLUTION RESPIRATORY (INHALATION) at 22:18

## 2021-06-09 RX ADMIN — TICAGRELOR 30 MG: 60 TABLET ORAL at 08:54

## 2021-06-09 RX ADMIN — INSULIN LISPRO 2 UNITS: 100 INJECTION, SOLUTION INTRAVENOUS; SUBCUTANEOUS at 18:38

## 2021-06-09 RX ADMIN — HYDRALAZINE HYDROCHLORIDE 25 MG: 25 TABLET, FILM COATED ORAL at 18:28

## 2021-06-09 RX ADMIN — BENZONATATE 100 MG: 100 CAPSULE ORAL at 08:54

## 2021-06-09 RX ADMIN — PANTOPRAZOLE SODIUM 40 MG: 40 TABLET, DELAYED RELEASE ORAL at 08:52

## 2021-06-09 RX ADMIN — BENZONATATE 100 MG: 100 CAPSULE ORAL at 18:29

## 2021-06-09 RX ADMIN — BISACODYL 10 MG: 10 SUPPOSITORY RECTAL at 10:45

## 2021-06-09 RX ADMIN — BACLOFEN 2.5 MG: 10 TABLET ORAL at 22:17

## 2021-06-09 RX ADMIN — FLUTICASONE PROPIONATE 2 SPRAY: 50 SPRAY, METERED NASAL at 08:57

## 2021-06-09 RX ADMIN — ATORVASTATIN CALCIUM 80 MG: 40 TABLET, FILM COATED ORAL at 22:08

## 2021-06-09 NOTE — CONSULTS
PSYCHIATRY CONSULT NOTE:    REASON FOR CONSULT: depression      HISTORY OF PRESENTING COMPLAINT:  Tylor Cullen is a 77 y.o. BLACK/ female who is currently admitted to the medical floor at [facility]. Psychiatry consulted due to concern for depression. She initially denied feeling any depression but later in assessment became tearful when discussing that two of her sons have passed away as well as her  and her mother. She feels hopeless at times. Denies SI/HI/AH/VH. She does not remember what she used to take cymbalta for but does know that she has taken some antidepressants in the past. She feels that her son does take good care of her at home. PAST PSYCHIATRIC HISTORY and SUBSTANCE ABUSE HISTORY:  History of depression      PAST MEDICAL HISTORY:    Please see H&P for details. No past medical history on file. Prior to Admission medications    Medication Sig Start Date End Date Taking? Authorizing Provider   acetaminophen (TYLENOL) 500 mg tablet Take 500 mg by mouth every six (6) hours as needed for Pain. Yes Provider, Historical   albuterol (PROVENTIL HFA, VENTOLIN HFA, PROAIR HFA) 90 mcg/actuation inhaler Take 2 Puffs by inhalation every six (6) hours as needed for Wheezing. Yes Provider, Historical   amLODIPine (NORVASC) 10 mg tablet Take 10 mg by mouth daily. Yes Provider, Historical   aspirin delayed-release 81 mg tablet Take 81 mg by mouth daily. Yes Provider, Historical   atorvastatin (LIPITOR) 80 mg tablet Take 80 mg by mouth daily. Yes Provider, Historical   benzonatate (TESSALON) 100 mg capsule Take 100 mg by mouth two (2) times daily as needed for Cough. Yes Provider, Historical   carvediloL (COREG) 6.25 mg tablet Take 6.25 mg by mouth two (2) times daily (with meals). Yes Provider, Historical   diclofenac (VOLTAREN) 1 % gel Apply 2 g to affected area two (2) times a day.    Yes Provider, Historical   DULoxetine (CYMBALTA) 60 mg capsule Take 60 mg by mouth daily. Yes Provider, Historical   fluticasone propionate (FLONASE) 50 mcg/actuation nasal spray 2 Sprays by Both Nostrils route daily. Yes Provider, Historical   gabapentin (NEURONTIN) 100 mg capsule Take 100 mg by mouth two (2) times a day. Yes Provider, Historical   glipiZIDE (GLUCOTROL) 10 mg tablet Take 10 mg by mouth two (2) times a day. Yes Provider, Historical   albuterol-ipratropium (DUO-NEB) 2.5 mg-0.5 mg/3 ml nebu 3 mL by Nebulization route every six (6) hours as needed for Wheezing. Yes Provider, Historical   lisinopriL (PRINIVIL, ZESTRIL) 10 mg tablet Take 10 mg by mouth daily. Yes Provider, Historical   loratadine (CLARITIN) 10 mg tablet Take 10 mg by mouth daily. Yes Provider, Historical   magnesium 250 mg tab Take 250 mg by mouth daily. Yes Provider, Historical   ondansetron (ZOFRAN ODT) 8 mg disintegrating tablet Take 8 mg by mouth daily. Yes Provider, Historical   QUEtiapine (SEROquel) 25 mg tablet Take 25 mg by mouth nightly. Yes Provider, Historical   senna (Senna) 8.6 mg tablet Take 1 Tablet by mouth two (2) times a day. Yes Provider, Historical   ergocalciferol (Vitamin D2) 1,250 mcg (50,000 unit) capsule Take 50,000 Units by mouth every seven (7) days.    Yes Provider, Historical     Vitals:    06/09/21 0852 06/09/21 1015 06/09/21 1209 06/09/21 1348   BP: (!) 154/87 (!) 168/69 (!) 143/78 (!) 146/63   Pulse: 73 68 72 63   Resp:  22  21   Temp:  98.2 °F (36.8 °C)  98.2 °F (36.8 °C)   SpO2:  96%  100%   Weight:       Height:         Lab Results   Component Value Date/Time    WBC 6.9 06/08/2021 09:30 AM    HGB 9.8 (L) 06/08/2021 09:30 AM    HCT 31.9 (L) 06/08/2021 09:30 AM    PLATELET 161 01/57/7653 09:30 AM    MCV 82.4 06/08/2021 09:30 AM     Lab Results   Component Value Date/Time    Sodium 140 06/09/2021 12:05 PM    Potassium 3.9 06/09/2021 12:05 PM    Chloride 111 (H) 06/09/2021 12:05 PM    CO2 20 (L) 06/09/2021 12:05 PM    Anion gap 9 06/09/2021 12:05 PM    Glucose 193 (H) 06/09/2021 12:05 PM    BUN 32 (H) 06/09/2021 12:05 PM    Creatinine 1.75 (H) 06/09/2021 12:05 PM    BUN/Creatinine ratio 18 06/09/2021 12:05 PM    GFR est AA 34 (L) 06/09/2021 12:05 PM    GFR est non-AA 28 (L) 06/09/2021 12:05 PM    Calcium 9.2 06/09/2021 12:05 PM    Bilirubin, total 0.4 06/09/2021 12:05 PM    Alk. phosphatase 104 06/09/2021 12:05 PM    Protein, total 7.3 06/09/2021 12:05 PM    Albumin 3.5 06/09/2021 12:05 PM    Globulin 3.8 06/09/2021 12:05 PM    A-G Ratio 0.9 (L) 06/09/2021 12:05 PM    ALT (SGPT) 18 06/09/2021 12:05 PM    AST (SGOT) 17 06/09/2021 12:05 PM     No results found for: VALF2, VALAC, VALP, VALPR, DS6, CRBAM, CRBAMP, CARB2, XCRBAM  No results found for: LITHM  RADIOLOGY REPORTS:(reviewed/updated 6/9/2021)  MRI BRAIN WO CONT    Result Date: 6/7/2021  PRELIMINARY REPORT: Encephalomalacia right frontal lobe. Areas of hemosiderin deposition compatible with chronic hemorrhage. An acute hemorrhage is not identified. There are linear areas of restricted diffusion demonstrating decreased signal on ADC concerning for acute on chronic infarct. Extensive white matter changes with punctate hemosiderin deposition in the basal ganglia Preliminary report was provided by Dr. Nicole Crowell, the on-call radiologist, at 2104 Final report to follow. **FINAL REPORT BELOW** **FINAL REPORT BELOW** EXAM:  MRI BRAIN WO CONT INDICATION:  CVA, patient with previous history of stroke brought by EMS severe headache and slurred speech. TECHNIQUE: Sagittal T1, axial FLAIR, T2,T1 and gradient echo images as well as coronal T2 weighted images and axial diffusion weighted images of the head were obtained. COMPARISON:  CT, CTA FINDINGS: Noted above there is encephalomalacia related to previous infarction right anterior lateral frontal lobe.  Small areas of hemosiderin as well as foci of T1 shortening in the cortex consistent with mineralization and/or minimal residual hemorrhagic products consistent with subacute/chronic infarct. Areas of T1 shortening also have restricted diffusion most likely due to subacute infarct and susceptibility and does not necessarily represent superimposed acute infarction. Punctate foci of restricted diffusion superior to this are indeterminate and may represent minimal area of acute infarction. There is extensive and confluent T2 hyperintensity in the cerebral white matter, basal ganglia and also involvement of the brainstem in a pattern consistent with chronic small vessel ischemic change. Additionally, punctate foci of hemosiderin deposition in some of the basal ganglia and pattern also suggesting chronic small vessel disease possibly on the basis of chronic hypertension. No evidence of acute intracranial hemorrhage, mass or abnormal extra-axial fluid collections. Flow voids are present in vertebral basilar and carotid artery systems. Small amount of fluid dependent right maxillary sinus. Other structures and skull base are unremarkable. 1. Chronic right lateral frontal infarct with small if any associated acute infarction. 2. Prominent white matter changes consistent chronic small vessel ischemic change. US RETROPERITONEUM COMP    Result Date: 6/8/2021  RENAL ULTRASOUND INDICATION: worsening renal functions COMPARISON: None. TECHNIQUE: Sonography of the kidneys, retroperitoneum, and bladder was performed. FINDINGS: RIGHT KIDNEY: 10.1 cm in length. Normal cortical echogenicity. No hydronephrosis, shadowing calculus, or contour-deforming renal mass. LEFT KIDNEY: 10.1 cm in length. Normal cortical echogenicity. No hydronephrosis, shadowing calculus, or contour-deforming renal mass. AORTA: Normal caliber in its visualized portions. COMMON ILIAC ARTERIES: Normal caliber proximally. IVC: Normal caliber in its visualized portions. BLADDER: Normally distended. OTHER: Spleen is small and echogenic. Normal renal size and cortical echogenicity. No hydronephrosis.      XR CHEST PORT    Result Date: 6/5/2021  EXAM:  XR CHEST PORT INDICATION:  cough, r/o pna COMPARISON:  None. FINDINGS: A portable AP radiograph of the chest was obtained at 16:35 hours. The patient is on a cardiac monitor. The lungs are clear. The cardiac and mediastinal contours and pulmonary vascularity are normal.  The bones and soft tissues are unremarkable. No acute process. CTA CODE NEURO HEAD AND NECK W CONT    Result Date: 6/4/2021  EXAM:  CTA CODE NEURO HEAD AND NECK W CONT, CT CODE NEURO PERF W CBF INDICATION:  Slurred Speech COMPARISON:  Noncontrast head CT of 6/4/2021 TECHNIQUE:  Multislice helical axial CT angiography was performed from the aortic arch to the top of the head during uneventful rapid bolus intravenous administration of 120 mL of Isovue 370. Postprocessing was performed to include MIP and reformatted images. Standard images of the head were also obtained following contrast administration and a delay. CT brain perfusion was performed with generation of hemodynamic maps of multiple parameters, including cerebral blood flow, cerebral volume, Tmax and MTT (mean transit time). This study was analyzed by the [address] ai algorithm. CT dose reduction was achieved through the use of a standardized protocol tailored for this examination and automatic exposure control for dose modulation. FINDINGS: CT PERFUSION: There are no regional areas of elevated Tmax, decreased cerebral blood flow or blood volume as calculated by the AI algorithm. . There is matched reduced perfusion in the location of the chronic right frontal infarct. rCBF < 30% = 0 cc. Tmax > 6 seconds = 0 cc. Head CT: The ventricles are normal in size and midline. .  There is no intracranial hemorrhage or evolving infarct. . A chronic right frontal lobe infarct is again noted. There is hypodensity of the cerebral white matter, consistent with chronic small vessel ischemic change. There is no abnormal enhancement.  . . CTA NECK: Great vessels: Normal arch anatomy with the origins patent. Right subclavian artery: Patent Left subclavian artery: Patent Right common carotid artery: Patent Left common carotid artery: Patent Cervical right internal carotid artery: Patent with no significant stenosis by NASCET criteria. Tortuosity of the proximal to mid internal carotid artery, with a retropharyngeal location. Cervical left internal carotid artery: Patent with moderate (40-60%) stenosis by NASCET criteria. Tortuosity of the proximal to mid internal carotid artery, with a retropharyngeal location. Right vertebral artery: Patent. Focal calcified plaque at the origin with moderate stenosis. Left vertebral artery: Patent. Focal calcified plaque at the origin with moderate stenosis. Lung apices clear. No suspicious thyroid nodule. Subcentimeter cystic-appearing left thyroid nodule. Enlarged main pulmonary artery suggesting pulmonary arterial hypertension. Cervical spine degenerative changes. CTA HEAD: Right cavernous internal carotid artery: Patent. Diffuse calcified plaque with moderate to severe stenosis. Left cavernous internal carotid artery: Patent. Diffuse calcified plaque with moderate to severe stenosis. Anterior cerebral arteries: Patent Anterior communicating artery: Patent Right middle cerebral artery: Patent. Irregularity, consistent with atherosclerotic disease without flow-limiting stenosis. Left middle cerebral artery: Patent. Irregularity, consistent with atherosclerotic disease, without flow-limiting stenosis. Posterior cerebral arteries: Patent Posterior communicating arteries: Patent Basilar artery: Patent Distal vertebral arteries: Patent Major venous sinus and deep venous system: Patent. Aneurysm: None. 1. Small matched perfusion defect in the location of the patient's chronic right frontal infarct. Otherwise negative CT perfusion study, without perfusion mismatch. 2. No acute large vessel occlusion. 3. Moderate left internal carotid artery origin stenosis. Moderate to severe bilateral cavernous carotid stenoses. Irregularity of the middle cerebral arteries bilaterally without focal flow-limiting stenosis. CT CODE NEURO HEAD WO CONTRAST    Result Date: 6/4/2021  EXAM: CT CODE NEURO HEAD WO CONTRAST INDICATION: Slurred Speech, headache COMPARISON: None. CONTRAST: None. TECHNIQUE: Unenhanced CT of the head was performed using 5 mm images. Brain and bone windows were generated. Coronal and sagittal reformats. CT dose reduction was achieved through use of a standardized protocol tailored for this examination and automatic exposure control for dose modulation. FINDINGS: The ventricles and sulci are normal in size, shape and configuration. . Old right frontal infarct is noted. Significant small vessel ischemic changes are seen in the periventricular white matter. There is no intracranial hemorrhage, extra-axial collection, or mass effect. The basilar cisterns are open. No CT evidence of acute infarct. The bone windows demonstrate no abnormalities. The visualized portions of the paranasal sinuses and mastoid air cells are clear. Vascular calcification is noted. Old right frontal infarct and significant small vessel ischemic changes. No acute abnormality. ECHO ADULT COMPLETE    Result Date: 6/5/2021  · Saline contrast was given to evaluate for intracardiac shunt. · LV: Estimated LVEF is 55 - 60%. Normal cavity size and systolic function (ejection fraction normal). Moderate concentric hypertrophy. Mild (grade 1) left ventricular diastolic dysfunction. · LA: Mildly dilated left atrium. · IAS: No atrial septal defect present. Agitated saline contrast study was performed. There was no shunting at baseline or with Valsalva. · AV: Mild aortic valve stenosis is present. · MV: Mitral valve non-specific thickening. Mitral valve leaflet calcification. Mild mitral annular calcification. · PV: Mild pulmonic valve regurgitation is present.       CT CODE NEURO PERF W CBF    Result Date: 6/4/2021  EXAM:  CTA CODE NEURO HEAD AND NECK W CONT, CT CODE NEURO PERF W CBF INDICATION:  Slurred Speech COMPARISON:  Noncontrast head CT of 6/4/2021 TECHNIQUE:  Multislice helical axial CT angiography was performed from the aortic arch to the top of the head during uneventful rapid bolus intravenous administration of 120 mL of Isovue 370. Postprocessing was performed to include MIP and reformatted images. Standard images of the head were also obtained following contrast administration and a delay. CT brain perfusion was performed with generation of hemodynamic maps of multiple parameters, including cerebral blood flow, cerebral volume, Tmax and MTT (mean transit time). This study was analyzed by the [address] ai algorithm. CT dose reduction was achieved through the use of a standardized protocol tailored for this examination and automatic exposure control for dose modulation. FINDINGS: CT PERFUSION: There are no regional areas of elevated Tmax, decreased cerebral blood flow or blood volume as calculated by the AI algorithm. . There is matched reduced perfusion in the location of the chronic right frontal infarct. rCBF < 30% = 0 cc. Tmax > 6 seconds = 0 cc. Head CT: The ventricles are normal in size and midline. .  There is no intracranial hemorrhage or evolving infarct. . A chronic right frontal lobe infarct is again noted. There is hypodensity of the cerebral white matter, consistent with chronic small vessel ischemic change. There is no abnormal enhancement. . . CTA NECK: Great vessels: Normal arch anatomy with the origins patent. Right subclavian artery: Patent Left subclavian artery: Patent Right common carotid artery: Patent Left common carotid artery: Patent Cervical right internal carotid artery: Patent with no significant stenosis by NASCET criteria. Tortuosity of the proximal to mid internal carotid artery, with a retropharyngeal location.  Cervical left internal carotid artery: Patent with moderate (40-60%) stenosis by NASCET criteria. Tortuosity of the proximal to mid internal carotid artery, with a retropharyngeal location. Right vertebral artery: Patent. Focal calcified plaque at the origin with moderate stenosis. Left vertebral artery: Patent. Focal calcified plaque at the origin with moderate stenosis. Lung apices clear. No suspicious thyroid nodule. Subcentimeter cystic-appearing left thyroid nodule. Enlarged main pulmonary artery suggesting pulmonary arterial hypertension. Cervical spine degenerative changes. CTA HEAD: Right cavernous internal carotid artery: Patent. Diffuse calcified plaque with moderate to severe stenosis. Left cavernous internal carotid artery: Patent. Diffuse calcified plaque with moderate to severe stenosis. Anterior cerebral arteries: Patent Anterior communicating artery: Patent Right middle cerebral artery: Patent. Irregularity, consistent with atherosclerotic disease without flow-limiting stenosis. Left middle cerebral artery: Patent. Irregularity, consistent with atherosclerotic disease, without flow-limiting stenosis. Posterior cerebral arteries: Patent Posterior communicating arteries: Patent Basilar artery: Patent Distal vertebral arteries: Patent Major venous sinus and deep venous system: Patent. Aneurysm: None. 1. Small matched perfusion defect in the location of the patient's chronic right frontal infarct. Otherwise negative CT perfusion study, without perfusion mismatch. 2. No acute large vessel occlusion. 3. Moderate left internal carotid artery origin stenosis. Moderate to severe bilateral cavernous carotid stenoses. Irregularity of the middle cerebral arteries bilaterally without focal flow-limiting stenosis.     No results found for: Elton Reagin, [id number], [id number], [id number], PREGU, POCHCG, MHCGN, HCGQR, THCGA1, SHCG, HCGN, [address], [id number]    PSYCHOSOCIAL HISTORY:  Lives with 2 of her sons      MENTAL STATUS EXAM:    General appearance: appropriately groomed, psychomotor activity is wnl  Eye contact: Avoids eye contact  Speech: Spontaneous, soft, decreased output. Affect : Depressed, decreased range  Mood: \"ok \"  Thought Process: Logical, goal directed  Perception: Denies AH or VH. Thought Content: SI or Plan  Insight: Partial  Judgement: Fair  Cognition: Intact grossly. ASSESSMENT AND PLAN:  Amish Arambula meets criteria for a diagnosis of major depressive disorder, recurrent, moderate. Recommendation:  Restart cymbalta 30mg daily, she has previously been on this medication. Connect with outpatient psychiatric resources       Thank your your consult. Please feel free to consult us again as needed.

## 2021-06-09 NOTE — PROGRESS NOTES
Bedside shift change report given to Mei Dixon RN (oncoming nurse) by Joe Ghosh RN (offgoing nurse). Report included the following information SBAR, Kardex, Recent Results, Cardiac Rhythm NSR and Dual Neuro Assessment.

## 2021-06-09 NOTE — PROGRESS NOTES
Transition of Care Plan   RUR- Low  18%   DISPOSITION: [facility]PR [facility] pending insurance auth appeal vs. SNF (choice is [facility])   F/U with PCP/Specialist     Transport: SARINA NORMAN received call from patient's daughter, Zoë Webb regarding discharge plan. Patient has been to [facility] before and open to returning if appeal at [facility] does not come through. CM spoke with Vita Bonner at [facility]; appeal was submitted yesterday, 6/9 to [facility] Turn around time is 1-2 days. TERE Cruz.

## 2021-06-09 NOTE — PROGRESS NOTES
[address] [facility] Adult  Hospitalist Group                                                                                          Hospitalist Progress Note  Martha Mata MD  Answering service: [phone] from in house phone        Date of Service:  2021  NAME:  Kyle Gibson  :  1945  MRN:  [id number]      Admission Summary:   Kyle Gibson is a 77 y.o. female who presents with dysarthria     History is primary obtained from the patient     Patient reports that she went to bed at [facility] at around 10:00 last night. Woke up this morning and had a headache associated with slurred speech. Patient reports that she has had a stroke in the past and has left-sided deficit. Patient came to the ER as a code stroke, patient currently reports that all her symptoms have resolved. Patient denies any other complaints or problems. Patient was requested to be admitted to the hospitalist service. Patient denies any recent falls or injuries. Interval history / Subjective: Follow up TIA, headache with slurred speech. Patient seen and examined at the bedside. Labs, images and notes reviewed  Discussed with nursing staff, orders reviewed. Plan discussed with patient/Family  Patient tearful again. Not feeling good. Some nausea this morning. Suggested no BM for few days. Per patient last BM on Friday. Nursing team was able to suggest last BM on Monday. Willing for bowel regimen and suppository/enema. Could not sleep well last night. Suggested that she ask for help without much success. Blood glucose discrepancy between Accu-Chek and BMP noted. P2P with [facility] physician Dr. Anastasiia Rodriguez on 2021. Conveyed PT/OT team significant concern for patient safety and recommendation against discharge home with home health. Also that patient has good rehab potential.  Physician expressed her knowing the patient very well from previous encounters.   Decision was already made about not approving for inpatient rehab and the only choice for home with home health. Also conveyed to me to convey to the patient that she can appeal the decision. I have also expressed my concern for patient safety for discharge home with home health based on expert inputs and my assessment of the patient in the hospital.  D/W case management team this morning. I was updated about appeal process has been initiated. SNF choice process ongoing. Assessment & Plan:     #Acute on chronic renal insufficiency, baseline CKD 3, currently progressed to level 4 with renal functions worsening, creatinine baseline around 1.4-1.6 as found during discussion with P2P physician at [facility]. Creatinine up to 2.13 on 6/8. No response to IVF given on 6/7-8. Morning labs pending-awaiting  #Headache with slurred speech, resolved. #Acute R lateral frontal acute on chronic infarction chronic small vessel ischemic changes as noted in MRI brain on 6/4  #Intermittent tearfulness, known Hx of chronic depression  #Subacute cough without any sputum, better  #Right nasal stuffiness, better  #DM2 with hyperglycemia. A1c 8.2  #Elevated blood pressure, better with amlodipine and carvedilol, better with wide variation  #Anemia, acute/chronic. No baseline. #Mild thrombocytopenia  #Hypertriglyceridemia, triglycerides 534. Total cholesterol 243    -Admitted to inpatient, neuro telemetry  -Neurology on board, appreciate recommendations. -NIS on board, appreciate recommendations. Medical management  -Dual antiplatelet regimen with aspirin and Brilinta per neurology. Aspirin 81 mg and Brilinta 30 mg twice daily with therapeutic/supratherapeutic platelet function test  -Follow MRI brain without contrast noted 6/4  -Follow echocardiogram noted 6/5  -PT/OT/ST noted. IPR recommendation on discharge. Accepted at [facility] [address] by insurance after PTP. Only option given home with home health. Not safety as per experts and treatment team here. Patient has chosen to appeal.  SNF choice process ongoing while awaiting for results of appeal.  May take 1-2 days. At least at this point, discharge consideration to home with home health would be a very high risk for patient safety and at this point, based on team recommendations with assessment here, the liability would be solely to the physician of the insurance company who has denied rehab based on chart review and have suggested home with home health as is the only option. We will continue to follow further clinical progression while patient is continuing her inpatient care. -Adjust amlodipine from 10 mg daily to 5 mg twice daily  -Continue carvedilol to 12.5 mg twice daily  -Continue hydralazine 10 mg 3 times daily  -Nephrology consultation for acute on chronic renal insufficiency. -Avoid nephrotoxic medications. Renally adjusted meds. -Renal ultrasound done on 6/8/2021 unremarkable  -Urine lytes and creatinine to check FENa ordered  -Close BP monitoring. Goal <140/90. Would appreciate further input from nephrology team as well.? Need for MOSES evaluation?  -Check PCT level. <0.05. -CXR for cough, unremarkable 6/5  -Continue pantoprazole 40 mg twice daily for possible GERD as a differential of cough  -Mucinex 600 mg twice daily  -Tessalon Perles as needed  -Atorvastatin 80 mg daily at bedtime  -Bowel regimen, if needed suppository  -Continue breathing treatments: Scheduled albuterol as well as PRN for excessive cough  -NPH uptitrated to 14 units twice daily  -Humalog sliding scale insulin with serial Accu-Cheks  -Psychiatry consult for concern for exacerbation of depression and known case of chronic depression    Code status: Full code  DVT prophylaxis: SCD    Care Plan discussed with: Patient/Family and Nurse  Anticipated Disposition: IPR-accepted at [facility].   Pending authorization-awaiting 6/7  Anticipated Discharge: 24-48 hours     Hospital Problems  Never Reviewed        Codes Class Noted POA    CVA (cerebral vascular accident) [facility]) ICD-10-CM: I63.9  ICD-9-CM: 434.91  6/4/2021 Unknown        Intracranial atherosclerosis ICD-10-CM: R35.7  ICD-9-CM: 437.0  6/4/2021 Yes        Stenosis of right carotid artery ICD-10-CM: I65.21  ICD-9-CM: 433.10  6/4/2021 Yes        Aneurysm of middle cerebral artery ICD-10-CM: I67.1  ICD-9-CM: 437.3  6/4/2021 Yes    Overview Signed 6/5/2021  5:20 PM by Marita Salomon NP     Left MCA aneurysm                      Review of Systems:   A comprehensive review of systems was negative except for that written in the HPI. Vital Signs:    Last 24hrs VS reviewed since prior progress note. Most recent are:  Visit Vitals  BP (!) 154/87   Pulse 73   Temp 98.2 °F (36.8 °C)   Resp 18   Ht 5' 4\" (1.626 m)   Wt 82.9 kg (182 lb 11.2 oz)   SpO2 98%   BMI 31.36 kg/m²         Intake/Output Summary (Last 24 hours) at 6/9/2021 2837  Last data filed at 6/9/2021 0400  Gross per 24 hour   Intake 2240 ml   Output 800 ml   Net 1440 ml        Physical Examination:     I had a face to face encounter with this patient and independently examined them on 6/9/2021 as outlined below: Unchanged from previous day          General : alert x 3, awake, NAD, appears to be stated age. Comfortable  HEENT: PEERL, EOMI, moist mucus membrane, TM clear  Neck: supple, no carotid bruits  Chest: Clear to auscultation bilaterally, no RRW  CVS: S1 S2 heard, no MRG  Abd: soft/ Non tender, non distended, BS physiological,   Ext: no clubbing, no cyanosis,   Neuro/Psych: pleasant mood and affect, CN 2-12 grossly intact, sensory grossly within normal limit, Strength 4/5 in LUE and LL, 5/5 right upper and right lower extremity weakness, DTR 1+ x 4  Skin: warm          Data Review:    Review and/or order of clinical lab test  Review and/or order of tests in the radiology section of CPT  Review and/or order of tests in the medicine section of CPT    Radiology:  MRI BRAIN WO CONT    Result Date: 6/7/2021  1.  Chronic right lateral frontal infarct with small if any associated acute infarction. 2. Prominent white matter changes consistent chronic small vessel ischemic change. US RETROPERITONEUM COMP    Result Date: 6/8/2021  Normal renal size and cortical echogenicity. No hydronephrosis. XR CHEST PORT    Result Date: 6/5/2021  No acute process. CTA CODE NEURO HEAD AND NECK W CONT    Result Date: 6/4/2021  1. Small matched perfusion defect in the location of the patient's chronic right frontal infarct. Otherwise negative CT perfusion study, without perfusion mismatch. 2. No acute large vessel occlusion. 3. Moderate left internal carotid artery origin stenosis. Moderate to severe bilateral cavernous carotid stenoses. Irregularity of the middle cerebral arteries bilaterally without focal flow-limiting stenosis. CT CODE NEURO HEAD WO CONTRAST    Result Date: 6/4/2021  Old right frontal infarct and significant small vessel ischemic changes. No acute abnormality. CT CODE NEURO PERF W CBF    Result Date: 6/4/2021  1. Small matched perfusion defect in the location of the patient's chronic right frontal infarct. Otherwise negative CT perfusion study, without perfusion mismatch. 2. No acute large vessel occlusion. 3. Moderate left internal carotid artery origin stenosis. Moderate to severe bilateral cavernous carotid stenoses. Irregularity of the middle cerebral arteries bilaterally without focal flow-limiting stenosis. Labs:     Recent Labs     06/08/21  0930 06/07/21  0835   WBC 6.9 6.7   HGB 9.8* 10.4*   HCT 31.9* 34.0*    176     Recent Labs     06/08/21  0930 06/07/21  0835 06/06/21  1257    138 137   K 4.3 3.9 4.0   * 107 107   CO2 22 23 23   BUN 36* 30* 30*   CREA 2.13* 2.05* 1.97*   * 290* 289*   CA 9.0 9.6 9.3     Recent Labs     06/08/21  0930   ALT 17      TBILI 0.3   TP 7.4   ALB 3.7   GLOB 3.7     No results for input(s): INR, PTP, APTT, INREXT, INREXT in the last 72 hours.    No results for input(s): FE, TIBC, PSAT, FERR in the last 72 hours. No results found for: FOL, RBCF   No results for input(s): PH, PCO2, PO2 in the last 72 hours. No results for input(s): CPK, CKNDX, TROIQ in the last 72 hours.     No lab exists for component: CPKMB  Lab Results   Component Value Date/Time    Cholesterol, total 243 (H) 06/05/2021 04:48 AM    HDL Cholesterol 37 06/05/2021 04:48 AM    LDL,Direct 82 06/05/2021 04:48 AM    LDL, calculated Not calculated due to elevated triglyceride level 06/05/2021 04:48 AM    Triglyceride 534 (H) 06/05/2021 04:48 AM    CHOL/HDL Ratio 6.6 (H) 06/05/2021 04:48 AM     Lab Results   Component Value Date/Time    Glucose (POC) 174 (H) 06/09/2021 06:02 AM    Glucose (POC) 128 (H) 06/08/2021 09:27 PM    Glucose (POC) 171 (H) 06/08/2021 05:38 PM    Glucose (POC) 297 (H) 06/08/2021 11:12 AM    Glucose (POC) 217 (H) 06/08/2021 07:18 AM     Lab Results   Component Value Date/Time    Color YELLOW/STRAW 06/08/2021 11:39 PM    Appearance CLEAR 06/08/2021 11:39 PM    Specific gravity 1.011 06/08/2021 11:39 PM    pH (UA) 5.0 06/08/2021 11:39 PM    Protein 30 (A) 06/08/2021 11:39 PM    Glucose Negative 06/08/2021 11:39 PM    Ketone Negative 06/08/2021 11:39 PM    Bilirubin Negative 06/08/2021 11:39 PM    Urobilinogen 0.2 06/08/2021 11:39 PM    Nitrites Negative 06/08/2021 11:39 PM    Leukocyte Esterase Negative 06/08/2021 11:39 PM    Epithelial cells FEW 06/08/2021 11:39 PM    Bacteria Negative 06/08/2021 11:39 PM    WBC 0-4 06/08/2021 11:39 PM    RBC 0-5 06/08/2021 11:39 PM         Medications Reviewed:     Current Facility-Administered Medications   Medication Dose Route Frequency    bisacodyL (DULCOLAX) suppository 10 mg  10 mg Rectal DAILY PRN    insulin lispro (HUMALOG) injection 4 Units  4 Units SubCUTAneous TID WITH MEALS    amLODIPine (NORVASC) tablet 5 mg  5 mg Oral BID    hydrALAZINE (APRESOLINE) tablet 10 mg  10 mg Oral TID    carvediloL (COREG) tablet 12.5 mg  12.5 mg Oral BID WITH MEALS    insulin NPH (NOVOLIN N, HUMULIN N) injection 12 Units  12 Units SubCUTAneous ACB&D    albuterol (PROVENTIL VENTOLIN) nebulizer solution 2.5 mg  2.5 mg Nebulization Q6H PRN    ondansetron (ZOFRAN) injection 4 mg  4 mg IntraVENous Q8H PRN    0.9% sodium chloride infusion  100 mL/hr IntraVENous CONTINUOUS    ticagrelor (BRILINTA) tablet 30 mg  30 mg Oral Q12H    baclofen (LIORESAL) tablet 2.5 mg  2.5 mg Oral TID    fluticasone propionate (FLONASE) 50 mcg/actuation nasal spray 2 Spray  2 Spray Both Nostrils DAILY    albuterol (PROVENTIL VENTOLIN) nebulizer solution 2.5 mg  2.5 mg Nebulization Q4H PRN    pantoprazole (PROTONIX) tablet 40 mg  40 mg Oral BID    guaiFENesin ER (MUCINEX) tablet 600 mg  600 mg Oral BID    benzonatate (TESSALON) capsule 100 mg  100 mg Oral TID PRN    glucose chewable tablet 16 g  4 Tablet Oral PRN    dextrose (D50W) injection syrg 12.5-25 g  25-50 mL IntraVENous PRN    glucagon (GLUCAGEN) injection 1 mg  1 mg IntraMUSCular PRN    insulin lispro (HUMALOG) injection   SubCUTAneous AC&HS    acetaminophen (TYLENOL) tablet 650 mg  650 mg Oral Q4H PRN    Or    acetaminophen (TYLENOL) solution 650 mg  650 mg Per NG tube Q4H PRN    Or    acetaminophen (TYLENOL) suppository 650 mg  650 mg Rectal Q4H PRN    aspirin chewable tablet 81 mg  81 mg Oral DAILY    atorvastatin (LIPITOR) tablet 80 mg  80 mg Oral QHS     ______________________________________________________________________  EXPECTED LENGTH OF STAY: 2d 21h  ACTUAL LENGTH OF STAY:          Kareem Garcia MD

## 2021-06-09 NOTE — PROGRESS NOTES
Problem: Mobility Impaired (Adult and Pediatric)  Goal: *Acute Goals and Plan of Care (Insert Text)  Description: FUNCTIONAL STATUS PRIOR TO ADMISSION: Patient was modified independent using a single point cane for functional mobility. HOME SUPPORT PRIOR TO ADMISSION: The patient lived with 2 sons who provided support but is left alone for prolonged periods of time. Physical Therapy Goals  Initiated 6/5/2021  1. Patient will move from supine to sit and sit to supine  in bed with modified independence within 7 day(s). 2.  Patient will transfer from bed to chair and chair to bed with modified independence using the least restrictive device within 7 day(s). 3.  Patient will ambulate with modified independence for 200 feet with the least restrictive device within 7 day(s). 4.  Patient will ascend/descend 4 stairs with bilateral handrail(s) with modified independence within 7 day(s). 5.  Patient will improve Quinonez Balance score by 7 points within 7 days. Outcome: Progressing Towards Goal   PHYSICAL THERAPY TREATMENT  Patient: Deepthi Kendall (77 y.o. female)  Date: 6/9/2021  Diagnosis: CVA (cerebral vascular accident) [facility]) [I63.9] <principal problem not specified>       Precautions: Fall  Chart, physical therapy assessment, plan of care and goals were reviewed. ASSESSMENT  Patient continues with skilled PT services and is progressing towards goals. Pt received supine in bed and agreeable to therapy. Pt continues to be limited by generalized weakness, impaired balance and gait from previous mod I with SPC, decreased tolerance to activity. Pt with intermittent emotional lability during session. Pt completed transfers with RW with CGA-min A + verbal cues for proper hand placement for safety. Pt tolerated progressive gait training x 50 ft with RW with CGA-min A and verbal cues for safe RW distance. Pt remains beoow baseline mobility status and would benefit from inpatient rehab upon discharge.  Continue to recommend inpatient rehab upon discharge to continue therapy efforts. .     Current Level of Function Impacting Discharge (mobility/balance): CGA-min A sit<>stand with RW, gait training x 50 ft with RW    Other factors to consider for discharge: previously mod I, new CVA, limited support at home         PLAN :  Patient continues to benefit from skilled intervention to address the above impairments. Continue treatment per established plan of care. to address goals. Recommendation for discharge: (in order for the patient to meet his/her long term goals)  Therapy 3 hours per day 5-7 days per week; if not then SNF. This discharge recommendation:  Has been made in collaboration with the attending provider and/or case management    IF patient discharges home will need the following DME: to be determined (TBD)       SUBJECTIVE:   Patient stated I need to go to the bathroom real bad. My stomach hurts.     OBJECTIVE DATA SUMMARY:   Critical Behavior:  Neurologic State: Alert, Eyes open spontaneously  Orientation Level: Oriented X4  Cognition: Decreased attention/concentration  Safety/Judgement: Fall prevention  Functional Mobility Training:  Bed Mobility:  Rolling: Contact guard assistance  Supine to Sit: Contact guard assistance; Additional time              Transfers:  Sit to Stand: Contact guard assistance;Minimum assistance  Stand to Sit: Contact guard assistance;Minimum assistance (verbal cues for proper hand placement)                             Balance:  Sitting: Intact  Standing: Impaired; With support  Standing - Static: Good;Constant support (with RW)  Standing - Dynamic : Fair  Ambulation/Gait Training:  Distance (ft): 50 Feet (ft)  Assistive Device: Gait belt;Walker, rolling  Ambulation - Level of Assistance: Contact guard assistance;Minimal assistance        Gait Abnormalities: Decreased step clearance        Base of Support: Narrowed     Speed/Yuki: Pace decreased (<100 feet/min); Slow  Step Length: Right shortened;Left shortened               Therapeutic Exercises:   Reaching to target in standing with CGA and support of RW  Pain Rating:  Pt reported abdominal pain--RN is aware    Activity Tolerance:   Good and Fair    After treatment patient left in no apparent distress:   Sitting in chair, Call bell within reach, Bed / chair alarm activated, Caregiver / family present, and Side rails x 3    COMMUNICATION/COLLABORATION:   The patients plan of care was discussed with: Occupational therapist and Registered nurse.      Scarlett Pappas, PT, DPT   Time Calculation: 26 mins

## 2021-06-09 NOTE — CONSULTS
[facility]   [address] [facility], [address] Critical access hospital  Phone: [phone]   [phone] NOTE     Patient: Juan Pereyra MRN: [id number]  PCP: Unknown, Provider   :     1945  Age:   77 y.o. Sex:  female      Referring physician: Stefan Gonzalez MD  Reason for consultation: 77 y.o. female with CVA (cerebral vascular accident) [facility]) [Z86.7] complicated by DERREK  Admission Date: 2021 10:38 AM  LOS: 5 days      ASSESSMENT and PLAN :   DERREK on CKD   - suspect contrast nephropathy   - urine lytes : Intrinsic picture   - UA trace proteinuria likely 2/2 Diabetic Kidney disease. Ordered Myeloma screen due to her anemic state    - Imaging : renal US : unremarkable Kidneys   - Non oliguric, uncontrolled BP w/ rising Creatinine   - can continue w/ IVF for another 24 hrs and expect Cr to plateau   - she is ok for d/c from renal stand point   - recommend holding Lisinopril until ARF resolves - expect 1-2 weeks     CKD 3  - baseline Cr per [facility] : 1.4-1.5 mg/dl   - likely 2/2 HTN +/- DM     TIA     HTN   - lisinopril on hold for DERREK    - increased hydralazine     Anemia :  - out of proportion for degree of ckd  - ordered Myeloma screen     Obesity       Care Plan discussed with:  Pt       Thank you for consulting [facility] in the care of your patient. Subjective:   HPI: Juan Pereyra is a 77 y.o.  female who has been admitted to the hospital for stroke like Sx and underwent CTA. She has a serum creatinine of 1.5 mg/dl on admission and since admission her serum Creatinine has bene steadily rising and is upto 2.1 mg/dl which prompted renal consult. She is a very poor historian. Reports > 20 years Hx of DM but unsure how long she had HTN. Was not aware of prior CKD but per [facility] MD her baseline Cr was 1.4-1.5 mg/dl. She denies any urinary sx. Denies any NSAID use     Past Medical Hx:   No past medical history on file. Past Surgical Hx:   No past surgical history on file. No Known Allergies    Social Hx:       No family history on file. Review of Systems:  A thorough twelve point review of system was performed today. Pertinent positives and negatives are mentioned in the HPI. The reminder of the ROS is negative and noncontributory. Objective:    Vitals:    Vitals:    06/09/21 0600 06/09/21 0852 06/09/21 1015 06/09/21 1209   BP: (!) 164/65 (!) 154/87 (!) 168/69 (!) 143/78   Pulse: 71 73 68 72   Resp: 18  22    Temp: 98.2 °F (36.8 °C)  98.2 °F (36.8 °C)    SpO2: 98%  96%    Weight:       Height:         I&O's:  06/08 0701 - 06/09 0700  In: 8869 [P.O.:240; I.V.:2400]  Out: 800 [Urine:800]  Visit Vitals  BP (!) 143/78   Pulse 72   Temp 98.2 °F (36.8 °C)   Resp 22   Ht 5' 4\" (1.626 m)   Wt 82.9 kg (182 lb 11.2 oz)   SpO2 96%   BMI 31.36 kg/m²       Physical Exam:  General:  No apparent Distress  HEENT: PERRL,  No Pallor , No Icterus  Neck: Supple,no mass palpable  Lungs : CTA  CVS: RRR, S1 S2 normal, No murmur   Abdomen: Soft, NT, BS +  Extremities: Edema  Skin: No rash or lesions.   MS: No joint swelling, erythema, warmth  Neurologic: non focal, AAO x 3  Psych:  Unable to assess    Laboratory Results:    Recent Labs     06/09/21  1205 06/08/21  0930 06/07/21  0835    138 138   K 3.9 4.3 3.9   * 110* 107   CO2 20* 22 23   * 296* 290*   BUN 32* 36* 30*   CREA 1.75* 2.13* 2.05*   CA 9.2 9.0 9.6   ALB 3.5 3.7  --    ALT 18 17  --      Recent Labs     06/08/21  0930 06/07/21  0835   WBC 6.9 6.7   HGB 9.8* 10.4*   HCT 31.9* 34.0*    176     No results found for: SDES  No results found for: CULT  Recent Results (from the past 24 hour(s))   COVID-19 RAPID TEST    Collection Time: 06/08/21  1:14 PM   Result Value Ref Range    Specimen source Nasopharyngeal      COVID-19 rapid test Not detected NOTD     GLUCOSE, POC    Collection Time: 06/08/21  5:38 PM   Result Value Ref Range    Glucose (POC) 171 (H) 65 - 117 mg/dL    Performed by Dru Lawrence    GLUCOSE, POC    Collection Time: 06/08/21  9:27 PM   Result Value Ref Range    Glucose (POC) 128 (H) 65 - 117 mg/dL    Performed by Elizabeth vivar RN    CREATININE, UR, RANDOM    Collection Time: 06/08/21 11:39 PM   Result Value Ref Range    Creatinine, urine 77.50 mg/dL   SODIUM, UR, RANDOM    Collection Time: 06/08/21 11:39 PM   Result Value Ref Range    Sodium,urine random 47 MMOL/L   URINALYSIS W/ REFLEX CULTURE    Collection Time: 06/08/21 11:39 PM    Specimen: Urine   Result Value Ref Range    Color YELLOW/STRAW      Appearance CLEAR CLEAR      Specific gravity 1.011 1.003 - 1.030      pH (UA) 5.0 5.0 - 8.0      Protein 30 (A) NEG mg/dL    Glucose Negative NEG mg/dL    Ketone Negative NEG mg/dL    Bilirubin Negative NEG      Blood Negative NEG      Urobilinogen 0.2 0.2 - 1.0 EU/dL    Nitrites Negative NEG      Leukocyte Esterase Negative NEG      UA:UC IF INDICATED CULTURE NOT INDICATED BY UA RESULT CNI      WBC 0-4 0 - 4 /hpf    RBC 0-5 0 - 5 /hpf    Epithelial cells FEW FEW /lpf    Bacteria Negative NEG /hpf    Hyaline cast 0-2 0 - 5 /lpf   SAMPLES BEING HELD    Collection Time: 06/08/21 11:39 PM   Result Value Ref Range    SAMPLES BEING HELD 1CUP URINE     COMMENT        Add-on orders for these samples will be processed based on acceptable specimen integrity and analyte stability, which may vary by analyte.    GLUCOSE, POC    Collection Time: 06/09/21  6:02 AM   Result Value Ref Range    Glucose (POC) 174 (H) 65 - 117 mg/dL    Performed by Elizabeth vivar RN    GLUCOSE, POC    Collection Time: 06/09/21 11:41 AM   Result Value Ref Range    Glucose (POC) 208 (H) 65 - 117 mg/dL    Performed by Dru Lawrence    METABOLIC PANEL, COMPREHENSIVE    Collection Time: 06/09/21 12:05 PM   Result Value Ref Range    Sodium 140 136 - 145 mmol/L    Potassium 3.9 3.5 - 5.1 mmol/L    Chloride 111 (H) 97 - 108 mmol/L    CO2 20 (L) 21 - 32 mmol/L    Anion gap 9 5 - 15 mmol/L    Glucose 193 (H) 65 - 100 mg/dL    BUN 32 (H) 6 - 20 MG/DL    Creatinine 1.75 (H) 0.55 - 1.02 MG/DL    BUN/Creatinine ratio 18 12 - 20      GFR est AA 34 (L) >60 ml/min/1.73m2    GFR est non-AA 28 (L) >60 ml/min/1.73m2    Calcium 9.2 8.5 - 10.1 MG/DL    Bilirubin, total 0.4 0.2 - 1.0 MG/DL    ALT (SGPT) 18 12 - 78 U/L    AST (SGOT) 17 15 - 37 U/L    Alk. phosphatase 104 45 - 117 U/L    Protein, total 7.3 6.4 - 8.2 g/dL    Albumin 3.5 3.5 - 5.0 g/dL    Globulin 3.8 2.0 - 4.0 g/dL    A-G Ratio 0.9 (L) 1.1 - 2.2     LIPID PANEL    Collection Time: 06/09/21 12:05 PM   Result Value Ref Range    Cholesterol, total 229 (H) <200 MG/DL    Triglyceride 360 (H) <150 MG/DL    HDL Cholesterol 33 MG/DL    LDL, calculated 124 (H) 0 - 100 MG/DL    VLDL, calculated 72 MG/DL    CHOL/HDL Ratio 6.9 (H) 0.0 - 5.0           Urine dipstick:   Lab Results   Component Value Date/Time    Color YELLOW/STRAW 06/08/2021 11:39 PM    Appearance CLEAR 06/08/2021 11:39 PM    Specific gravity 1.011 06/08/2021 11:39 PM    pH (UA) 5.0 06/08/2021 11:39 PM    Protein 30 (A) 06/08/2021 11:39 PM    Glucose Negative 06/08/2021 11:39 PM    Ketone Negative 06/08/2021 11:39 PM    Bilirubin Negative 06/08/2021 11:39 PM    Urobilinogen 0.2 06/08/2021 11:39 PM    Nitrites Negative 06/08/2021 11:39 PM    Leukocyte Esterase Negative 06/08/2021 11:39 PM    Epithelial cells FEW 06/08/2021 11:39 PM    Bacteria Negative 06/08/2021 11:39 PM    WBC 0-4 06/08/2021 11:39 PM    RBC 0-5 06/08/2021 11:39 PM       I have reviewed the following: All pertinent labs, microbiology data, radiology imaging for my assessment     Medications list Personally Reviewed   [x]      Yes     []               No       Medications:  Prior to Admission medications    Medication Sig Start Date End Date Taking? Authorizing Provider   acetaminophen (TYLENOL) 500 mg tablet Take 500 mg by mouth every six (6) hours as needed for Pain.    Yes Provider, Historical   albuterol (PROVENTIL HFA, VENTOLIN HFA, PROAIR HFA) 90 mcg/actuation inhaler Take 2 Puffs by inhalation every six (6) hours as needed for Wheezing. Yes Provider, Historical   amLODIPine (NORVASC) 10 mg tablet Take 10 mg by mouth daily. Yes Provider, Historical   aspirin delayed-release 81 mg tablet Take 81 mg by mouth daily. Yes Provider, Historical   atorvastatin (LIPITOR) 80 mg tablet Take 80 mg by mouth daily. Yes Provider, Historical   benzonatate (TESSALON) 100 mg capsule Take 100 mg by mouth two (2) times daily as needed for Cough. Yes Provider, Historical   carvediloL (COREG) 6.25 mg tablet Take 6.25 mg by mouth two (2) times daily (with meals). Yes Provider, Historical   diclofenac (VOLTAREN) 1 % gel Apply 2 g to affected area two (2) times a day. Yes Provider, Historical   DULoxetine (CYMBALTA) 60 mg capsule Take 60 mg by mouth daily. Yes Provider, Historical   fluticasone propionate (FLONASE) 50 mcg/actuation nasal spray 2 Sprays by Both Nostrils route daily. Yes Provider, Historical   gabapentin (NEURONTIN) 100 mg capsule Take 100 mg by mouth two (2) times a day. Yes Provider, Historical   glipiZIDE (GLUCOTROL) 10 mg tablet Take 10 mg by mouth two (2) times a day. Yes Provider, Historical   albuterol-ipratropium (DUO-NEB) 2.5 mg-0.5 mg/3 ml nebu 3 mL by Nebulization route every six (6) hours as needed for Wheezing. Yes Provider, Historical   lisinopriL (PRINIVIL, ZESTRIL) 10 mg tablet Take 10 mg by mouth daily. Yes Provider, Historical   loratadine (CLARITIN) 10 mg tablet Take 10 mg by mouth daily. Yes Provider, Historical   magnesium 250 mg tab Take 250 mg by mouth daily. Yes Provider, Historical   ondansetron (ZOFRAN ODT) 8 mg disintegrating tablet Take 8 mg by mouth daily. Yes Provider, Historical   QUEtiapine (SEROquel) 25 mg tablet Take 25 mg by mouth nightly. Yes Provider, Historical   senna (Senna) 8.6 mg tablet Take 1 Tablet by mouth two (2) times a day.    Yes Provider, Historical ergocalciferol (Vitamin D2) 1,250 mcg (50,000 unit) capsule Take 50,000 Units by mouth every seven (7) days. Yes Provider, Historical        Thank you for allowing us to participate in the care of this patient. We will follow patient. Please dont hesitate to call with any questions    Raphael Smith MD  [facility]   [id number] [place] [address], [address] [place] [address] [address], [facility]  Phone - [phone]   Fax - [phone]  www. [facility]

## 2021-06-10 LAB
COMMENT, HOLDF: NORMAL
GLUCOSE BLD STRIP.AUTO-MCNC: 182 MG/DL (ref 65–117)
GLUCOSE BLD STRIP.AUTO-MCNC: 235 MG/DL (ref 65–117)
GLUCOSE BLD STRIP.AUTO-MCNC: 89 MG/DL (ref 65–117)
GLUCOSE BLD STRIP.AUTO-MCNC: 92 MG/DL (ref 65–117)
SAMPLES BEING HELD,HOLD: NORMAL
SERVICE CMNT-IMP: ABNORMAL
SERVICE CMNT-IMP: ABNORMAL
SERVICE CMNT-IMP: NORMAL
SERVICE CMNT-IMP: NORMAL

## 2021-06-10 PROCEDURE — 74011636637 HC RX REV CODE- 636/637: Performed by: INTERNAL MEDICINE

## 2021-06-10 PROCEDURE — 84155 ASSAY OF PROTEIN SERUM: CPT

## 2021-06-10 PROCEDURE — 74011250637 HC RX REV CODE- 250/637: Performed by: INTERNAL MEDICINE

## 2021-06-10 PROCEDURE — 65660000000 HC RM CCU STEPDOWN

## 2021-06-10 PROCEDURE — 83883 ASSAY NEPHELOMETRY NOT SPEC: CPT

## 2021-06-10 PROCEDURE — 74011250637 HC RX REV CODE- 250/637: Performed by: NURSE PRACTITIONER

## 2021-06-10 PROCEDURE — 97535 SELF CARE MNGMENT TRAINING: CPT

## 2021-06-10 PROCEDURE — 82962 GLUCOSE BLOOD TEST: CPT

## 2021-06-10 PROCEDURE — 74011250637 HC RX REV CODE- 250/637: Performed by: PSYCHIATRY & NEUROLOGY

## 2021-06-10 PROCEDURE — 82784 ASSAY IGA/IGD/IGG/IGM EACH: CPT

## 2021-06-10 PROCEDURE — 74011250637 HC RX REV CODE- 250/637: Performed by: FAMILY MEDICINE

## 2021-06-10 PROCEDURE — 97116 GAIT TRAINING THERAPY: CPT

## 2021-06-10 PROCEDURE — 99231 SBSQ HOSP IP/OBS SF/LOW 25: CPT | Performed by: CLINICAL NURSE SPECIALIST

## 2021-06-10 PROCEDURE — 36415 COLL VENOUS BLD VENIPUNCTURE: CPT

## 2021-06-10 RX ORDER — LOPERAMIDE HYDROCHLORIDE 2 MG/1
2 CAPSULE ORAL
Status: DISCONTINUED | OUTPATIENT
Start: 2021-06-10 | End: 2021-06-12 | Stop reason: HOSPADM

## 2021-06-10 RX ADMIN — INSULIN LISPRO 4 UNITS: 100 INJECTION, SOLUTION INTRAVENOUS; SUBCUTANEOUS at 08:34

## 2021-06-10 RX ADMIN — HYDRALAZINE HYDROCHLORIDE 25 MG: 25 TABLET, FILM COATED ORAL at 15:40

## 2021-06-10 RX ADMIN — BACLOFEN 2.5 MG: 10 TABLET ORAL at 15:40

## 2021-06-10 RX ADMIN — HYDRALAZINE HYDROCHLORIDE 25 MG: 25 TABLET, FILM COATED ORAL at 21:11

## 2021-06-10 RX ADMIN — INSULIN LISPRO 2 UNITS: 100 INJECTION, SOLUTION INTRAVENOUS; SUBCUTANEOUS at 11:56

## 2021-06-10 RX ADMIN — TICAGRELOR 30 MG: 60 TABLET ORAL at 21:10

## 2021-06-10 RX ADMIN — INSULIN LISPRO 3 UNITS: 100 INJECTION, SOLUTION INTRAVENOUS; SUBCUTANEOUS at 08:34

## 2021-06-10 RX ADMIN — TICAGRELOR 30 MG: 60 TABLET ORAL at 08:34

## 2021-06-10 RX ADMIN — ATORVASTATIN CALCIUM 80 MG: 40 TABLET, FILM COATED ORAL at 21:11

## 2021-06-10 RX ADMIN — PANTOPRAZOLE SODIUM 40 MG: 40 TABLET, DELAYED RELEASE ORAL at 08:33

## 2021-06-10 RX ADMIN — ASPIRIN 81 MG: 81 TABLET, CHEWABLE ORAL at 08:33

## 2021-06-10 RX ADMIN — CARVEDILOL 12.5 MG: 12.5 TABLET, FILM COATED ORAL at 08:33

## 2021-06-10 RX ADMIN — LOPERAMIDE HYDROCHLORIDE 2 MG: 2 CAPSULE ORAL at 11:56

## 2021-06-10 RX ADMIN — BACLOFEN 2.5 MG: 10 TABLET ORAL at 08:33

## 2021-06-10 RX ADMIN — BENZONATATE 100 MG: 100 CAPSULE ORAL at 17:03

## 2021-06-10 RX ADMIN — AMLODIPINE BESYLATE 5 MG: 5 TABLET ORAL at 08:34

## 2021-06-10 RX ADMIN — AMLODIPINE BESYLATE 5 MG: 5 TABLET ORAL at 17:03

## 2021-06-10 RX ADMIN — CARVEDILOL 12.5 MG: 12.5 TABLET, FILM COATED ORAL at 17:03

## 2021-06-10 RX ADMIN — BACLOFEN 2.5 MG: 10 TABLET ORAL at 21:11

## 2021-06-10 RX ADMIN — HYDRALAZINE HYDROCHLORIDE 25 MG: 25 TABLET, FILM COATED ORAL at 08:34

## 2021-06-10 RX ADMIN — GUAIFENESIN 600 MG: 600 TABLET, EXTENDED RELEASE ORAL at 17:03

## 2021-06-10 RX ADMIN — HUMAN INSULIN 14 UNITS: 100 INJECTION, SUSPENSION SUBCUTANEOUS at 08:34

## 2021-06-10 RX ADMIN — INSULIN LISPRO 4 UNITS: 100 INJECTION, SOLUTION INTRAVENOUS; SUBCUTANEOUS at 11:56

## 2021-06-10 RX ADMIN — GUAIFENESIN 600 MG: 600 TABLET, EXTENDED RELEASE ORAL at 08:34

## 2021-06-10 RX ADMIN — INSULIN LISPRO 4 UNITS: 100 INJECTION, SOLUTION INTRAVENOUS; SUBCUTANEOUS at 17:03

## 2021-06-10 RX ADMIN — HUMAN INSULIN 14 UNITS: 100 INJECTION, SUSPENSION SUBCUTANEOUS at 17:03

## 2021-06-10 RX ADMIN — PANTOPRAZOLE SODIUM 40 MG: 40 TABLET, DELAYED RELEASE ORAL at 17:03

## 2021-06-10 NOTE — PROGRESS NOTES
[address] [facility] Adult  Hospitalist Group                                                                                          Hospitalist Progress Note  Rao Chavira NP  Answering service: [phone] OR [phone] from in house phone        Date of Service:  6/10/2021  NAME:  Laura Kaur  :  1945  MRN:  [id number]      Admission Summary:   Walt Krishnan a 77 y. o. female who presents with dysarthria  History is primary obtained from the patient  Patient reports that she went to bed at baseline health at around 10:00 last night.  Woke up this morning and had a headache associated with slurred speech.  Patient reports that she has had a stroke in the past and has left-sided deficit.  Patient came to the ER as a code stroke, patient currently reports that all her symptoms have resolved.  Patient denies any other complaints or problems.  Patient was requested to be admitted to the hospitalist service. Le Nix denies any recent falls or injuries. Interval history / Subjective:   Seen and examined patient sitting up in bed. Family member at bedside. Patient states that she is ok. Having some cough and shortness of breath. Encourage patient to cough, deep breath and to increase activity with assistance. Out of bed for all meals. 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 - Nephrology consulted. #Headache with slurred speech, resolved. #Acute R lateral frontal acute on chronic infarction chronic small vessel ischemic changes as noted in MRI brain on   -Neurology on board, appreciate recommendations. -NIS on board, appreciate recommendations. Medical management  -Dual antiplatelet regimen with aspirin and Brilinta per neurology.   Aspirin 81 mg and Brilinta 30 mg twice daily with therapeutic/supratherapeutic platelet function test  -Follow MRI brain without contrast noted 6/4  -Follow echocardiogram noted 6/5  -PT/OT/ST noted. IPR recommendation on discharge. Accepted at [facility] [address] by insurance after PTP. Only option given home with home health. Not safety as per experts and treatment team here. Patient has chosen to appeal.  SNF choice process ongoing while awaiting for results of appeal.  May take 1-2 days. At least at this point, discharge consideration to home with home health would be a very high risk for patient safety and at this point, based on team recommendations with assessment here, the liability would be solely to the physician of the insurance company who has denied rehab based on chart review and have suggested home with home health as is the only option. We will continue to follow further clinical progression while patient is continuing her inpatient care. #Intermittent tearfulness, known Hx of chronic depression  - Supportive treatment   - Psych consulted   - Restarted on Cymbalta     #Subacute cough without any sputum, better  -CXR for cough, unremarkable 6/5  -Continue pantoprazole 40 mg twice daily for possible GERD as a differential of cough  - Continue nebs PRN   - Continue Mucinex   - Encourage cough and deep breathing   - Out of bed for meals. #DM2 with hyperglycemia. A1c 8.2  - Monitor BG ac/hs   - Continue NPH 14 units BID and Humalog 4 units with meals   - Insulin sliding scale     #Elevated blood pressure, better with amlodipine and carvedilol, better with wide variation  -Close BP monitoring. Goal <140/90. Would appreciate further input from nephrology team as well.? Need for MOSES evaluation?  -Adjust amlodipine from 10 mg daily to 5 mg twice daily  -Continue carvedilol to 12.5 mg twice daily  -Continue hydralazine 10 mg 3 times daily    #Hypertriglyceridemia, triglycerides 534. Total cholesterol 243  Continue Atorvastatin 80mg     #Anemia, acute/chronic. No baseline.   #Mild thrombocytopenia          Code status: Full code   DVT prophylaxis: SCDs    Care Plan discussed with: Patient/Family and Nurse  Anticipated Disposition: IPR vs SNF placement   Anticipated Discharge: Placement pending      Hospital Problems  Never Reviewed        Codes Class Noted POA    CVA (cerebral vascular accident) ([facility] [address] ICD-10-CM: I63.9  ICD-9-CM: 434.91  6/4/2021 Unknown        Intracranial atherosclerosis ICD-10-CM: B28.6  ICD-9-CM: 437.0  6/4/2021 Yes        Stenosis of right carotid artery ICD-10-CM: I65.21  ICD-9-CM: 433.10  6/4/2021 Yes        Aneurysm of middle cerebral artery ICD-10-CM: I67.1  ICD-9-CM: 437.3  6/4/2021 Yes    Overview Signed 6/5/2021  5:20 PM by Ej Roberts NP     Left MCA aneurysm                      Review of Systems:   A comprehensive review of systems was negative except for that written in the HPI. Vital Signs:    Last 24hrs VS reviewed since prior progress note. Most recent are:  Visit Vitals  BP (!) 143/81 (BP 1 Location: Left arm, BP Patient Position: At rest)   Pulse 67   Temp 98.3 °F (36.8 °C)   Resp 14   Ht 5' 4\" (1.626 m)   Wt 84.7 kg (186 lb 12.8 oz)   SpO2 97%   BMI 32.06 kg/m²         Intake/Output Summary (Last 24 hours) at 6/10/2021 1004  Last data filed at 6/9/2021 1600  Gross per 24 hour   Intake 800 ml   Output --   Net 800 ml        Physical Examination:             Constitutional:  No acute distress, cooperative, pleasant, answers questions   ENT:  Oral mucosa moist, oropharynx benign. Resp:  CTA bilaterally on room air No wheezing/rhonchi/rales. No accessory muscle use   CV:  Regular rhythm, normal rate, no murmurs, gallops, rubs    GI:  Soft, non distended, non tender. normoactive bowel sounds,    Musculoskeletal:  No edema, warm, 2+ pulses throughout    Neurologic:  Moves all extremities. AAOx3, CN II-XII reviewed, follows commands.       Psych:  Not anxious or agitated       Data Review:    Review and/or order of clinical lab test  Review and/or order of tests in the medicine section of [facility]      Labs:     Recent Labs     06/08/21  0930   WBC 6.9   HGB 9.8*   HCT 31.9*        Recent Labs     06/09/21  1205 06/08/21  0930    138   K 3.9 4.3   * 110*   CO2 20* 22   BUN 32* 36*   CREA 1.75* 2.13*   * 296*   CA 9.2 9.0     Recent Labs     06/09/21  1205 06/08/21  0930   ALT 18 17    107   TBILI 0.4 0.3   TP 7.3 7.4   ALB 3.5 3.7   GLOB 3.8 3.7     No results for input(s): INR, PTP, APTT, INREXT in the last 72 hours. No results for input(s): FE, TIBC, PSAT, FERR in the last 72 hours. No results found for: FOL, RBCF   No results for input(s): PH, PCO2, PO2 in the last 72 hours. No results for input(s): CPK, CKNDX, TROIQ in the last 72 hours.     No lab exists for component: CPKMB  Lab Results   Component Value Date/Time    Cholesterol, total 229 (H) 06/09/2021 12:05 PM    HDL Cholesterol 33 06/09/2021 12:05 PM    LDL,Direct 82 06/05/2021 04:48 AM    LDL, calculated 124 (H) 06/09/2021 12:05 PM    Triglyceride 360 (H) 06/09/2021 12:05 PM    CHOL/HDL Ratio 6.9 (H) 06/09/2021 12:05 PM     Lab Results   Component Value Date/Time    Glucose (POC) 235 (H) 06/10/2021 07:33 AM    Glucose (POC) 139 (H) 06/09/2021 10:11 PM    Glucose (POC) 191 (H) 06/09/2021 06:03 PM    Glucose (POC) 208 (H) 06/09/2021 11:41 AM    Glucose (POC) 174 (H) 06/09/2021 06:02 AM     Lab Results   Component Value Date/Time    Color YELLOW/STRAW 06/08/2021 11:39 PM    Appearance CLEAR 06/08/2021 11:39 PM    Specific gravity 1.011 06/08/2021 11:39 PM    pH (UA) 5.0 06/08/2021 11:39 PM    Protein 30 (A) 06/08/2021 11:39 PM    Glucose Negative 06/08/2021 11:39 PM    Ketone Negative 06/08/2021 11:39 PM    Bilirubin Negative 06/08/2021 11:39 PM    Urobilinogen 0.2 06/08/2021 11:39 PM    Nitrites Negative 06/08/2021 11:39 PM    Leukocyte Esterase Negative 06/08/2021 11:39 PM    Epithelial cells FEW 06/08/2021 11:39 PM    Bacteria Negative 06/08/2021 11:39 PM WBC 0-4 06/08/2021 11:39 PM    RBC 0-5 06/08/2021 11:39 PM         Medications Reviewed:     Current Facility-Administered Medications   Medication Dose Route Frequency    bisacodyL (DULCOLAX) suppository 10 mg  10 mg Rectal DAILY PRN    insulin NPH (NOVOLIN N, HUMULIN N) injection 14 Units  14 Units SubCUTAneous ACB&D    hydrALAZINE (APRESOLINE) tablet 25 mg  25 mg Oral TID    docusate sodium (COLACE) capsule 100 mg  100 mg Oral DAILY    polyethylene glycol (MIRALAX) packet 17 g  17 g Oral DAILY    insulin lispro (HUMALOG) injection 4 Units  4 Units SubCUTAneous TID WITH MEALS    amLODIPine (NORVASC) tablet 5 mg  5 mg Oral BID    carvediloL (COREG) tablet 12.5 mg  12.5 mg Oral BID WITH MEALS    albuterol (PROVENTIL VENTOLIN) nebulizer solution 2.5 mg  2.5 mg Nebulization Q6H PRN    ondansetron (ZOFRAN) injection 4 mg  4 mg IntraVENous Q8H PRN    0.9% sodium chloride infusion  100 mL/hr IntraVENous CONTINUOUS    ticagrelor (BRILINTA) tablet 30 mg  30 mg Oral Q12H    baclofen (LIORESAL) tablet 2.5 mg  2.5 mg Oral TID    fluticasone propionate (FLONASE) 50 mcg/actuation nasal spray 2 Spray  2 Spray Both Nostrils DAILY    albuterol (PROVENTIL VENTOLIN) nebulizer solution 2.5 mg  2.5 mg Nebulization Q4H PRN    pantoprazole (PROTONIX) tablet 40 mg  40 mg Oral BID    guaiFENesin ER (MUCINEX) tablet 600 mg  600 mg Oral BID    benzonatate (TESSALON) capsule 100 mg  100 mg Oral TID PRN    glucose chewable tablet 16 g  4 Tablet Oral PRN    dextrose (D50W) injection syrg 12.5-25 g  25-50 mL IntraVENous PRN    glucagon (GLUCAGEN) injection 1 mg  1 mg IntraMUSCular PRN    insulin lispro (HUMALOG) injection   SubCUTAneous AC&HS    acetaminophen (TYLENOL) tablet 650 mg  650 mg Oral Q4H PRN    Or    acetaminophen (TYLENOL) solution 650 mg  650 mg Per NG tube Q4H PRN    Or    acetaminophen (TYLENOL) suppository 650 mg  650 mg Rectal Q4H PRN    aspirin chewable tablet 81 mg  81 mg Oral DAILY    atorvastatin (LIPITOR) tablet 80 mg  80 mg Oral QHS     ______________________________________________________________________  EXPECTED LENGTH OF STAY: 2d 21h  ACTUAL LENGTH OF STAY:          [facility], NP

## 2021-06-10 NOTE — PROGRESS NOTES
Problem: Self Care Deficits Care Plan (Adult)  Goal: *Acute Goals and Plan of Care (Insert Text)  Description:   FUNCTIONAL STATUS PRIOR TO ADMISSION: Patient was modified independent using a single point cane for functional mobility, largely Mod I for basic and some instrumental ADLs with inconsistent family assist.     HOME SUPPORT: The patient lived with her sons who work during the day leaving her alone for prolonged periods of time. Occupational Therapy Goals  Initiated 6/5/2021  1. Patient will perform grooming at the sink with supervision/set-up within 7 day(s). 2.  Patient will perform bathing with supervision/set-up within 7 day(s). 3.  Patient will perform upper and lower body dressing with supervision/set-up within 7 day(s). 4.  Patient will perform toilet transfers with supervision/set-up within 7 day(s). 5.  Patient will perform all aspects of toileting with supervision/set-up within 7 day(s). 6.  Patient will participate in upper extremity therapeutic exercise/activities with supervision/set-up for 10 minutes within 7 day(s). 7.  Patient will utilize energy conservation techniques during functional activities with verbal and visual cues within 7 day(s). Outcome: Progressing Towards Goal    OCCUPATIONAL THERAPY TREATMENT  Patient: Kailash Decker (77 y.o. female)  Date: 6/10/2021  Diagnosis: CVA (cerebral vascular accident) [facility]) [I63.9] <principal problem not specified>       Precautions: Fall  Chart, occupational therapy assessment, plan of care, and goals were reviewed. ASSESSMENT  Patient continues with skilled OT services and is progressing towards goals but remains limited by general weakness and impaired balance/ mobility. Patient received seated on toilet and performed the following with CGA using RW: donned underwear + pants, toileting, standing grooming, and ambulated ~100 feet.   Patient reports she lives with 2 sons, one who works and one who may not be able to provide assistance. Continue to recommend rehab at d/c. If she d/c home instead, she would benefit from [facility] and would require x1 CGA for OOB mobility/ ADLs and IADLs. Current Level of Function Impacting Discharge (ADLs): contact guard assistance           PLAN :  Patient continues to benefit from skilled intervention to address the above impairments. Continue treatment per established plan of care to address goals. Recommendation for discharge: (in order for the patient to meet his/her long term goals)  Therapy 3 hours per day 5-7 days per week  (If she d/c home instead, she would benefit from [facility] and would require x1 CGA for OOB mobility/ ADLs and IADLs)    This discharge recommendation:  Has been made in collaboration with the attending provider and/or case management         SUBJECTIVE:   Patient stated I feel ok.     OBJECTIVE DATA SUMMARY:   Cognitive/Behavioral Status:  Neurologic State: Eyes open spontaneously; Alert  Orientation Level: Oriented X4  Cognition: Follows commands;Decreased attention/concentration             Functional Mobility and Transfers for ADLs:  Bed Mobility:  Supine to Sit: Contact guard assistance; Additional time    Transfers:  Sit to Stand: Contact guard assistance          Balance:  Sitting: Intact  Standing: Impaired; With support  Standing - Static: Good  Standing - Dynamic : Fair    ADL Intervention:       Grooming  Washing Hands: Contact guard assistance (standing at sink, with RW)       Lower Body Dressing Assistance  Underpants: Contact guard assistance (threaded seated on toilet, stood to raise with pants)  Pants With Elastic Waist: Contact guard assistance (threaded seated on toilet, stood to raise with CGA)    Toileting  Bladder Hygiene: Supervision (seated on toilet)  Clothing Management: Contact guard assistance       Pain:  Patient did not report pain    Activity Tolerance:   VSS, good    After treatment patient left in no apparent distress:   Supine in bed, Call bell within reach, and Bed / chair alarm activated    COMMUNICATION/COLLABORATION:   The patients plan of care was discussed with: Physical therapist and Registered nurse. .    Patient and/or family was verbally educated on the BE FAST acronym for signs/symptoms of CVA and TIA. Provided with BEFAST handout. All questions answered with patient indicating good understanding.      Zari Draper OT  Time Calculation: 17 mins

## 2021-06-10 NOTE — PROGRESS NOTES
Bedside and Verbal shift change report given to [address] [place] [address] Avenue (oncoming nurse) by Tara Delgado (offgoing nurse). Report included the following information SBAR, Kardex, Intake/Output, MAR, Recent Results, Cardiac Rhythm NSR and Dual Neuro Assessment.

## 2021-06-10 NOTE — PROGRESS NOTES
Transition of Care Plan   RUR- Med. 19%   DISPOSITION: IPR (pending appeal at [facility]) vs. SNF ([facility] is following patient)   F/U with PCP/Specialist     Transport: SARINA NORMAN spoke with patient and patient's son, Guy Liu, at the bedside regarding a back up SNF choice if appeal at [facility] does not come through. Patient open to [facility]; referral sent in [facility]. CM spoke with Little Ordonez [phone] at [facility] to inform her of patient's insurance situation. 2:10: CM left message for Davida Diaz [phone] at [facility] regarding insurance appeal; Eugene Garrison if off today. 2:32pm: CM received call from [address] [place] [address]; appeal is still pending. TERE Palacios.

## 2021-06-10 NOTE — PROGRESS NOTES
[facility]   [address] [facility], [address], [facility]  Phone: [phone]   [phone]       Nephrology Progress Note  Jung Piña     1945     [id number]  Date of Admission : 6/4/2021  06/10/21    CC: Follow up for ARF     Assessment and Plan   DERREK on CKD   - suspect contrast nephropathy   - urine lytes : Intrinsic picture   - UA trace proteinuria likely 2/2 Diabetic Kidney disease.  - Imaging : renal US : unremarkable Kidneys   - Cr improving   - Ok for d/c from renal stand point. Can resume Lisinopril next week   - f/u Myeloma screen      CKD 3  - baseline Cr per veronica Care : 1.4-1.5 mg/dl   - likely 2/2 HTN +/- DM      TIA      HTN   - hold Lisinopril for now      Anemia :  - f/u Myeloma screen      Obesity         Care Plan discussed with:  Pt       Interval History:  Seen and examined, sitting in chair comfortably. Denies any N/V/CP/SOB    Review of Systems: Review of systems not obtained due to patient factors.     Current Medications:   Current Facility-Administered Medications   Medication Dose Route Frequency    bisacodyL (DULCOLAX) suppository 10 mg  10 mg Rectal DAILY PRN    insulin NPH (NOVOLIN N, HUMULIN N) injection 14 Units  14 Units SubCUTAneous ACB&D    hydrALAZINE (APRESOLINE) tablet 25 mg  25 mg Oral TID    docusate sodium (COLACE) capsule 100 mg  100 mg Oral DAILY    polyethylene glycol (MIRALAX) packet 17 g  17 g Oral DAILY    insulin lispro (HUMALOG) injection 4 Units  4 Units SubCUTAneous TID WITH MEALS    amLODIPine (NORVASC) tablet 5 mg  5 mg Oral BID    carvediloL (COREG) tablet 12.5 mg  12.5 mg Oral BID WITH MEALS    albuterol (PROVENTIL VENTOLIN) nebulizer solution 2.5 mg  2.5 mg Nebulization Q6H PRN    ondansetron (ZOFRAN) injection 4 mg  4 mg IntraVENous Q8H PRN    0.9% sodium chloride infusion  100 mL/hr IntraVENous CONTINUOUS    ticagrelor (BRILINTA) tablet 30 mg  30 mg Oral Q12H    baclofen (LIORESAL) tablet 2.5 mg  2.5 mg Oral TID  fluticasone propionate (FLONASE) 50 mcg/actuation nasal spray 2 Spray  2 Spray Both Nostrils DAILY    albuterol (PROVENTIL VENTOLIN) nebulizer solution 2.5 mg  2.5 mg Nebulization Q4H PRN    pantoprazole (PROTONIX) tablet 40 mg  40 mg Oral BID    guaiFENesin ER (MUCINEX) tablet 600 mg  600 mg Oral BID    benzonatate (TESSALON) capsule 100 mg  100 mg Oral TID PRN    glucose chewable tablet 16 g  4 Tablet Oral PRN    dextrose (D50W) injection syrg 12.5-25 g  25-50 mL IntraVENous PRN    glucagon (GLUCAGEN) injection 1 mg  1 mg IntraMUSCular PRN    insulin lispro (HUMALOG) injection   SubCUTAneous AC&HS    acetaminophen (TYLENOL) tablet 650 mg  650 mg Oral Q4H PRN    Or    acetaminophen (TYLENOL) solution 650 mg  650 mg Per NG tube Q4H PRN    Or    acetaminophen (TYLENOL) suppository 650 mg  650 mg Rectal Q4H PRN    aspirin chewable tablet 81 mg  81 mg Oral DAILY    atorvastatin (LIPITOR) tablet 80 mg  80 mg Oral QHS      No Known Allergies    Objective:  Vitals:    Vitals:    06/10/21 0346 06/10/21 0600 06/10/21 0840 06/10/21 1025   BP:  (!) 143/81 (!) 162/77 (!) 147/67   Pulse:  67 85 62   Resp:  14 18 14   Temp:  98.3 °F (36.8 °C)     SpO2:  97% (!) 88%    Weight: 84.7 kg (186 lb 12.8 oz)      Height:         Intake and Output:  No intake/output data recorded. 06/08 1901 - 06/10 0700  In: 2640 [P.O.:240; I.V.:2400]  Out: 800 [Urine:800]    Physical Examination:  General:  No apparent Distress  HEENT: PERRL,  No Pallor , No Icterus  Neck: Supple,no mass palpable  Lungs : CTA  CVS: RRR, S1 S2 normal, No murmur   Abdomen: Soft, NT, BS +  Extremities: Edema  Skin: No rash or lesions.   MS: No joint swelling, erythema, warmth  Neurologic: non focal, AAO x 3  Psych:  Unable to assess       []    High complexity decision making was performed  []    Patient is at high-risk of decompensation with multiple organ involvement    Lab Data Personally Reviewed: I have reviewed all the pertinent labs, microbiology data and radiology studies during assessment. Recent Labs     06/09/21  1205 06/08/21  0930    138   K 3.9 4.3   * 110*   CO2 20* 22   * 296*   BUN 32* 36*   CREA 1.75* 2.13*   CA 9.2 9.0   ALB 3.5 3.7   ALT 18 17     Recent Labs     06/08/21  0930   WBC 6.9   HGB 9.8*   HCT 31.9*        No results found for: SDES  No results found for: CULT  Recent Results (from the past 24 hour(s))   GLUCOSE, POC    Collection Time: 06/09/21 11:41 AM   Result Value Ref Range    Glucose (POC) 208 (H) 65 - 117 mg/dL    Performed by Sushil Worthy    METABOLIC PANEL, COMPREHENSIVE    Collection Time: 06/09/21 12:05 PM   Result Value Ref Range    Sodium 140 136 - 145 mmol/L    Potassium 3.9 3.5 - 5.1 mmol/L    Chloride 111 (H) 97 - 108 mmol/L    CO2 20 (L) 21 - 32 mmol/L    Anion gap 9 5 - 15 mmol/L    Glucose 193 (H) 65 - 100 mg/dL    BUN 32 (H) 6 - 20 MG/DL    Creatinine 1.75 (H) 0.55 - 1.02 MG/DL    BUN/Creatinine ratio 18 12 - 20      GFR est AA 34 (L) >60 ml/min/1.73m2    GFR est non-AA 28 (L) >60 ml/min/1.73m2    Calcium 9.2 8.5 - 10.1 MG/DL    Bilirubin, total 0.4 0.2 - 1.0 MG/DL    ALT (SGPT) 18 12 - 78 U/L    AST (SGOT) 17 15 - 37 U/L    Alk.  phosphatase 104 45 - 117 U/L    Protein, total 7.3 6.4 - 8.2 g/dL    Albumin 3.5 3.5 - 5.0 g/dL    Globulin 3.8 2.0 - 4.0 g/dL    A-G Ratio 0.9 (L) 1.1 - 2.2     LIPID PANEL    Collection Time: 06/09/21 12:05 PM   Result Value Ref Range    Cholesterol, total 229 (H) <200 MG/DL    Triglyceride 360 (H) <150 MG/DL    HDL Cholesterol 33 MG/DL    LDL, calculated 124 (H) 0 - 100 MG/DL    VLDL, calculated 72 MG/DL    CHOL/HDL Ratio 6.9 (H) 0.0 - 5.0     GLUCOSE, POC    Collection Time: 06/09/21  6:03 PM   Result Value Ref Range    Glucose (POC) 191 (H) 65 - 117 mg/dL    Performed by Dajuan GACRIA [place], POC    Collection Time: 06/09/21 10:11 PM   Result Value Ref Range    Glucose (POC) 139 (H) 65 - 117 mg/dL    Performed by Kale Collection Time: 06/10/21  3:11 AM   Result Value Ref Range    SAMPLES BEING HELD 1LAV,1PST     COMMENT        Add-on orders for these samples will be processed based on acceptable specimen integrity and analyte stability, which may vary by analyte. GLUCOSE, POC    Collection Time: 06/10/21  7:33 AM   Result Value Ref Range    Glucose (POC) 235 (H) 65 - 117 mg/dL    Performed by Jada Roberto            Total time spent with patient:  xxx   min. Care Plan discussed with:  Patient     Family      RN      Consulting Physician [facility]        I have reviewed the flowsheets. Chart and Pertinent Notes have been reviewed. No change in PMH ,family and social history from Consult note.       Megha Hoffmann MD

## 2021-06-10 NOTE — PROGRESS NOTES
Problem: Mobility Impaired (Adult and Pediatric)  Goal: *Acute Goals and Plan of Care (Insert Text)  Description: FUNCTIONAL STATUS PRIOR TO ADMISSION: Patient was modified independent using a single point cane for functional mobility. HOME SUPPORT PRIOR TO ADMISSION: The patient lived with 2 sons who provided support but is left alone for prolonged periods of time. Physical Therapy Goals  Initiated 6/5/2021  1. Patient will move from supine to sit and sit to supine  in bed with modified independence within 7 day(s). 2.  Patient will transfer from bed to chair and chair to bed with modified independence using the least restrictive device within 7 day(s). 3.  Patient will ambulate with modified independence for 200 feet with the least restrictive device within 7 day(s). 4.  Patient will ascend/descend 4 stairs with bilateral handrail(s) with modified independence within 7 day(s). 5.  Patient will improve Quinonez Balance score by 7 points within 7 days. Outcome: Progressing Towards Goal  PHYSICAL THERAPY TREATMENT  Patient: Jung Piña (77 y.o. female)  Date: 6/10/2021  Diagnosis: CVA (cerebral vascular accident) [facility]) [I63.9] <principal problem not specified>       Precautions: Fall  Chart, physical therapy assessment, plan of care and goals were reviewed. ASSESSMENT  Patient continues with skilled PT services and is progressing towards goals - remains limited by generalized weakness, impaired balance, unsteady gait, and decreased activity toleranc. Pt received in bed, reports feeling tired but agreeable to mobilize with therapy with encouragement. Transferred supine>sit with CGA, sit<>stand with CGA, and ambulated 40ft with CGA using RW. Pt demonstrated slow, shuffled, unsteady gait. Ended session seated EOB with all needs met. Current Level of Function Impacting Discharge (mobility/balance):  CGA for transfers and ambulation    Other factors to consider for discharge: fall risk, unsteady gait, mod I baseline          PLAN :  Patient continues to benefit from skilled intervention to address the above impairments. Continue treatment per established plan of care. to address goals. Recommendation for discharge: (in order for the patient to meet his/her long term goals)  Therapy 3 hours per day 5-7 days per week  If unable, recommending SNF    This discharge recommendation:  Has not yet been discussed the attending provider and/or case management    IF patient discharges home will need the following DME: walker       SUBJECTIVE:   Patient stated I'm so tired.     OBJECTIVE DATA SUMMARY:   Critical Behavior:  Neurologic State: Eyes open spontaneously, Alert  Orientation Level: Oriented X4  Cognition: Follows commands, Decreased attention/concentration  Safety/Judgement: Fall prevention  Functional Mobility Training:  Bed Mobility:     Supine to Sit: Contact guard assistance; Additional time              Transfers:  Sit to Stand: Contact guard assistance  Stand to Sit: Contact guard assistance                             Balance:  Sitting: Intact  Standing: Impaired; With support  Standing - Static: Good  Standing - Dynamic : Fair  Ambulation/Gait Training:  Distance (ft): 40 Feet (ft)  Assistive Device: Gait belt;Walker, rolling  Ambulation - Level of Assistance: Contact guard assistance        Gait Abnormalities: Decreased step clearance        Base of Support: Narrowed     Speed/Yuki: Slow;Shuffled  Step Length: Right shortened;Left shortened      Activity Tolerance:   Fair    After treatment patient left in no apparent distress:   Sitting EOB, call bell in reach, bed alarm on, all needs met    COMMUNICATION/COLLABORATION:   The patients plan of care was discussed with: Registered nurse.      Angelique Walsh, PT, DPT   Time Calculation: 12 mins

## 2021-06-10 NOTE — DIABETES MGMT
[address] [facility]    CLINICAL NURSE SPECIALIST CONSULT   FOLLOW UP NOTE    Initial Presentation   Amish Arambula is a 77 y.o. female who presented 6/4/21 with a HA and dysarthria. A Code S was called via EMS but on initial presentation to ED, symptoms had resolved. A CT was performed which was negative for acute process. HX: CVA, Type 2 Diabetes, CKD    DX: Acute CVA    Treatment plan     TX: CT, Neurology consultation    Hospital course   Clinical progress has been complicated by     Diabetes    Patient has known Type 2 diabetes, treated with insulin (type unknown) PTA. Family history unknown for diabetes. Admission  and A1c 8.2% indicate poor diabetes control PTA. Ambulatory blood glucose management provided by primary care provider: Manjit Krishna. Consulted by Noa Garcia MD for advanced diabetes nursing assessment and care, specifically related to   [x] Inpatient management strategy    Diabetes-related medical history  Acute complications: Acute hyperglycemia  Neurological complications  Peripheral neuropathy  Microvascular disease  Nephropathy  Macrovascular disease  Cerebral vascular accident  Other associated conditions     Depression    Diabetes medication history  Drug class Currently in use Discontinued Never used   Biguanide      DDP-4 inhibitor       Sulfonylurea  Glipizide 10mg BID    Thiazolidinedione      GLP-1 RA      SGLT-2 inhibitors      Basal insulin Novolin N  20 units BID     Bolus insulin      Fixed Dose  Combinations        Subjective   I am dizzy today    Patient reports a 32 year history of Type 2 Diabetes   She had a previous MCA stroke and has since been living with her son who assists her in ADLs, meals and medication administration   Is cared for at [facility]?     Patient reports the following home diabetes self-care practices:  Eating pattern  [x] Breakfast Eggs, cornned beef hash,  1/2 cup of milk and 1/2 cup of OJ  [x] Lunch  Skips  [x] Dinner  Fish  [x] Beverages Water, milk, fish  Physical activity pattern  [x] Aerobic exercise Limited due to debility  Monitoring pattern  [x] Breakfast 170  [x] Bedtime \"200s\"  Taking medications pattern  [x] Consistent administration  [x] Affordable    Social determinants of health impacting diabetes self-management practices   Struggling with anxiety and/or depression and Concerned that you need to know more about how to stay healthy with diabetes       New acute on chronic right MCA territory infarct  Triglyceride: 534 On Adult consistent carbohdyrate diet (3 carb choices- 45 grams CHO)  Fasting glucose 235  Pre-prandial glucose: 182/139/191/208  15 units in the last 24 hrs  NPH 14 units BID  Just added 4 units Humalog/meal      Stable for discharge, awaiting placement, appreciate case management. Objective   Physical exam  General   Neuro  Alert, oriented and in no acute distress/ill-appearing. Conversant and cooperative. Slowed speech, weak. Vital Signs   Visit Vitals  BP (!) 144/69   Pulse 63   Temp 98.3 °F (36.8 °C)   Resp 21   Ht 5' 4\" (1.626 m)   Wt 84.7 kg (186 lb 12.8 oz)   SpO2 (!) 88%   BMI 32.06 kg/m²     Skin  Warm and dry. Poor dentition. No acanthosis noted along neckline. No lipohypertrophy or lipoatrophy noted at injection sites   Heart   Regular rate and rhythm. No murmurs, rubs or gallops  Lungs  Clear to auscultation without rales or rhonchi  Extremities No foot wounds        Laboratory  No results found for this visit on 06/04/21 (from the past 12 hour(s)). No results found for this visit on 06/04/21 (from the past 12 hour(s)).   BMP:   No results found for: NA, K, CL, CO2, AGAP, GLU, BUN, CREA, GFRAA, GFRNA     Factors impacting BG management  Factor Dose Comments   Nutrition:  Carb-controlled meals     45 grams/meal        Blood glucose pattern        Assessment and Plan   Nursing Diagnosis Risk for unstable blood glucose pattern   Nursing Intervention Domain 12 Decision-making Support   Nursing Interventions Examined current inpatient diabetes control   Explored factors facilitating and impeding inpatient management  Identified self-management practices impeding diabetes control  Explored corrective strategies with patient and responsible inpatient provider   Informed patient of rational for insulin strategy while hospitalized     Evaluation   Maria T Guzmán is a 77year old female, with Type 2 diabetes, who was admitted with an acute on chronic MCA infarct. She did not achieve diabetes control prior to admission, as evidenced by admission BG of 310 and A1c of 8.2%. During this hospitalization, the patient has nearly achieved inpatient blood glucose target of 180 on NPH, bolus and correctional humalog. Several factors have played a role in blood glucose management including:  [x] Critical nature of illness state  [x]  Changing nutritive sources & needs   [x]  Kidney dysfunction    The Subcutaneous Insulin Order set (6976) has been in use. Hence, the next step in optimizing blood glucose control would be    [x]  Optimize basal insulin dosing     Based on patient's debility, glucose goal closer to a glucose of 180 with A1C of 7.5-8% to prevent hypoglycemia. Recommendations   1. POC glucose ACHS    2. Noted 45 gram CHO/meal, ok to advance to 60 grams cho/meal if hungry of needing calories    3.  Continue the Subcutaneous Insulin Order set (9177)  Insulin Dosing Specific recommendation   Basal                                      (Based on weight, BMI & GFR) 14 units NPH BID   If fasting glucose over 200,   increase to 16 units NPH   BID   Continue Nutritional            (Based on CHO/dextrose load) 4 units Humalog/meal Advance by 2 units daily for   persistent pre-prandial   hyperglycemia     Corrective                                       (Useful in adjusting insulin dosing) [x] [facility]          Diabetes Management Team to sign off at this point as patient's blood glucose remains stable. Please re-consult us if patient needs change. Thank you for including us in their care. Discharge Planning   1. Continue glucose monitoring before meals    2. Continue above insulin doses at SNF/[facility]      3. Please follow up with PCP on discharge for insulin adjustments. Billing Code(s)   [x] 57849    Before making these care recommendations, I personally reviewed the hosptialization record, including laboratory and diagnostic data, medications and examined the patient at bedside (circumstances permitting).   Total minutes: 15 mins    Corine Moreno CNS  Diabetes Clinical Nurse Specialist  [facility]  Access via [employer]

## 2021-06-10 NOTE — PROGRESS NOTES
Problem: Diabetes Self-Management  Goal: *Disease process and treatment process  Description: Define diabetes and identify own type of diabetes; list 3 options for treating diabetes. Outcome: Progressing Towards Goal  Goal: *Incorporating nutritional management into lifestyle  Description: Describe effect of type, amount and timing of food on blood glucose; list 3 methods for planning meals. Outcome: Progressing Towards Goal  Goal: *Incorporating physical activity into lifestyle  Description: State effect of exercise on blood glucose levels. Outcome: Progressing Towards Goal  Goal: *Developing strategies to promote health/change behavior  Description: Define the ABC's of diabetes; identify appropriate screenings, schedule and personal plan for screenings. Outcome: Progressing Towards Goal  Goal: *Using medications safely  Description: State effect of diabetes medications on diabetes; name diabetes medication taking, action and side effects. Outcome: Progressing Towards Goal  Goal: *Monitoring blood glucose, interpreting and using results  Description: Identify recommended blood glucose targets  and personal targets. Outcome: Progressing Towards Goal  Goal: *Prevention, detection, treatment of acute complications  Description: List symptoms of hyper- and hypoglycemia; describe how to treat low blood sugar and actions for lowering  high blood glucose level. Outcome: Progressing Towards Goal  Goal: *Prevention, detection and treatment of chronic complications  Description: Define the natural course of diabetes and describe the relationship of blood glucose levels to long term complications of diabetes.   Outcome: Progressing Towards Goal  Goal: *Developing strategies to address psychosocial issues  Description: Describe feelings about living with diabetes; identify support needed and support network  Outcome: Progressing Towards Goal  Goal: *Sick day guidelines  Outcome: Progressing Towards Goal     Problem: Patient Education: Go to Patient Education Activity  Goal: Patient/Family Education  Outcome: Progressing Towards Goal     Problem: Falls - Risk of  Goal: *Absence of Falls  Description: Document Vani Leary Fall Risk and appropriate interventions in the flowsheet.   Outcome: Progressing Towards Goal  Note: Fall Risk Interventions:  Mobility Interventions: Assess mobility with egress test, Bed/chair exit alarm, Communicate number of staff needed for ambulation/transfer, OT consult for ADLs, PT Consult for mobility concerns, Patient to call before getting OOB, PT Consult for assist device competence, Strengthening exercises (ROM-active/passive), Utilize walker, cane, or other assistive device, Utilize gait belt for transfers/ambulation    Mentation Interventions: Adequate sleep, hydration, pain control, Bed/chair exit alarm, Door open when patient unattended, Evaluate medications/consider consulting pharmacy, Gait belt with transfers/ambulation, Increase mobility, More frequent rounding, Reorient patient, Room close to nurse's station, Toileting rounds, Update white board    Medication Interventions: Assess postural VS orthostatic hypotension, Bed/chair exit alarm, Evaluate medications/consider consulting pharmacy, Patient to call before getting OOB, Teach patient to arise slowly, Utilize gait belt for transfers/ambulation    Elimination Interventions: Bed/chair exit alarm, Call light in reach, Elevated toilet seat, Patient to call for help with toileting needs, Stay With Me (per policy), Toilet paper/wipes in reach, Toileting schedule/hourly rounds    History of Falls Interventions: Bed/chair exit alarm, Consult care management for discharge planning, Door open when patient unattended, Evaluate medications/consider consulting pharmacy, Investigate reason for fall, Room close to nurse's station, Utilize gait belt for transfer/ambulation, Assess for delayed presentation/identification of injury for 48 hrs (comment for end date), Vital signs minimum Q4HRs X 24 hrs (comment for end date)         Problem: Patient Education: Go to Patient Education Activity  Goal: Patient/Family Education  Outcome: Progressing Towards Goal     Problem: Discharge Planning  Goal: *Discharge to safe environment  Outcome: Progressing Towards Goal  Goal: *Knowledge of medication management  Outcome: Progressing Towards Goal  Goal: *Knowledge of discharge instructions  Outcome: Progressing Towards Goal     Problem: Patient Education: Go to Patient Education Activity  Goal: Patient/Family Education  Outcome: Progressing Towards Goal     Problem: Breathing Pattern - Ineffective  Goal: *Absence of hypoxia  Outcome: Progressing Towards Goal  Goal: *Use of effective breathing techniques  Outcome: Progressing Towards Goal     Problem: Patient Education: Go to Patient Education Activity  Goal: Patient/Family Education  Outcome: Progressing Towards Goal     Problem: TIA/CVA Stroke: Day 2 Until Discharge  Goal: Activity/Safety  Outcome: Progressing Towards Goal  Goal: Diagnostic Test/Procedures  Outcome: Progressing Towards Goal  Goal: Nutrition/Diet  Outcome: Progressing Towards Goal  Goal: Discharge Planning  Outcome: Progressing Towards Goal  Goal: Medications  Outcome: Progressing Towards Goal  Goal: Respiratory  Outcome: Progressing Towards Goal  Goal: Treatments/Interventions/Procedures  Outcome: Progressing Towards Goal  Goal: Psychosocial  Outcome: Progressing Towards Goal  Goal: *Verbalizes anxiety and depression are reduced or absent  Outcome: Progressing Towards Goal  Goal: *Absence of aspiration  Outcome: Progressing Towards Goal  Goal: *Absence of deep venous thrombosis signs and symptoms(Stroke Metric)  Outcome: Progressing Towards Goal  Goal: *Optimal pain control at patient's stated goal  Outcome: Progressing Towards Goal  Goal: *Tolerating diet  Outcome: Progressing Towards Goal  Goal: *Ability to perform ADLs and demonstrates progressive mobility and function  Outcome: Progressing Towards Goal  Goal: *Stroke education continued(Stroke Metric)  Outcome: Progressing Towards Goal

## 2021-06-10 NOTE — PROGRESS NOTES
Bedside shift change report given to ELIDA Islas (oncoming nurse) by Sadie Watson (offgoing nurse). Report included the following information SBAR, Intake/Output, MAR, Cardiac Rhythm NSR and Dual Neuro Assessment.

## 2021-06-10 NOTE — PROGRESS NOTES
Problem: Diabetes Self-Management  Goal: *Disease process and treatment process  Description: Define diabetes and identify own type of diabetes; list 3 options for treating diabetes. Outcome: Progressing Towards Goal  Goal: *Incorporating nutritional management into lifestyle  Description: Describe effect of type, amount and timing of food on blood glucose; list 3 methods for planning meals. Outcome: Progressing Towards Goal  Goal: *Incorporating physical activity into lifestyle  Description: State effect of exercise on blood glucose levels. Outcome: Progressing Towards Goal  Goal: *Developing strategies to promote health/change behavior  Description: Define the ABC's of diabetes; identify appropriate screenings, schedule and personal plan for screenings. Outcome: Progressing Towards Goal  Goal: *Using medications safely  Description: State effect of diabetes medications on diabetes; name diabetes medication taking, action and side effects. Outcome: Progressing Towards Goal  Goal: *Monitoring blood glucose, interpreting and using results  Description: Identify recommended blood glucose targets  and personal targets. Outcome: Progressing Towards Goal  Goal: *Prevention, detection, treatment of acute complications  Description: List symptoms of hyper- and hypoglycemia; describe how to treat low blood sugar and actions for lowering  high blood glucose level. Outcome: Progressing Towards Goal  Goal: *Prevention, detection and treatment of chronic complications  Description: Define the natural course of diabetes and describe the relationship of blood glucose levels to long term complications of diabetes.   Outcome: Progressing Towards Goal  Goal: *Developing strategies to address psychosocial issues  Description: Describe feelings about living with diabetes; identify support needed and support network  Outcome: Progressing Towards Goal  Goal: *Insulin pump training  Outcome: Progressing Towards Goal  Goal: *Sick day guidelines  Outcome: Progressing Towards Goal  Goal: *Patient Specific Goal (EDIT GOAL, INSERT TEXT)  Outcome: Progressing Towards Goal     Problem: Patient Education: Go to Patient Education Activity  Goal: Patient/Family Education  Outcome: Progressing Towards Goal     Problem: Falls - Risk of  Goal: *Absence of Falls  Description: Document Rocio Loya Fall Risk and appropriate interventions in the flowsheet.   Outcome: Progressing Towards Goal  Note: Fall Risk Interventions:  Mobility Interventions: Assess mobility with egress test    Mentation Interventions: Adequate sleep, hydration, pain control    Medication Interventions: Assess postural VS orthostatic hypotension    Elimination Interventions: Bed/chair exit alarm    History of Falls Interventions: Bed/chair exit alarm         Problem: Patient Education: Go to Patient Education Activity  Goal: Patient/Family Education  Outcome: Progressing Towards Goal     Problem: Patient Education: Go to Patient Education Activity  Goal: Patient/Family Education  Outcome: Progressing Towards Goal     Problem: Patient Education: Go to Patient Education Activity  Goal: Patient/Family Education  Outcome: Progressing Towards Goal     Problem: Discharge Planning  Goal: *Discharge to safe environment  Outcome: Progressing Towards Goal  Goal: *Knowledge of medication management  Outcome: Progressing Towards Goal  Goal: *Knowledge of discharge instructions  Outcome: Progressing Towards Goal     Problem: Patient Education: Go to Patient Education Activity  Goal: Patient/Family Education  Outcome: Progressing Towards Goal     Problem: Breathing Pattern - Ineffective  Goal: *Absence of hypoxia  Outcome: Progressing Towards Goal  Goal: *Use of effective breathing techniques  Outcome: Progressing Towards Goal  Goal: *PALLIATIVE CARE:  Alleviation of Dyspnea  Outcome: Progressing Towards Goal     Problem: Patient Education: Go to Patient Education Activity  Goal: Patient/Family Education  Outcome: Progressing Towards Goal     Problem: TIA/CVA Stroke: Day 2 Until Discharge  Goal: Off Pathway (Use only if patient is Off Pathway)  Outcome: Progressing Towards Goal  Goal: Activity/Safety  Outcome: Progressing Towards Goal  Goal: Diagnostic Test/Procedures  Outcome: Progressing Towards Goal  Goal: Nutrition/Diet  Outcome: Progressing Towards Goal  Goal: Discharge Planning  Outcome: Progressing Towards Goal  Goal: Medications  Outcome: Progressing Towards Goal  Goal: Respiratory  Outcome: Progressing Towards Goal  Goal: Treatments/Interventions/Procedures  Outcome: Progressing Towards Goal  Goal: Psychosocial  Outcome: Progressing Towards Goal  Goal: *Verbalizes anxiety and depression are reduced or absent  Outcome: Progressing Towards Goal  Goal: *Absence of aspiration  Outcome: Progressing Towards Goal  Goal: *Absence of deep venous thrombosis signs and symptoms(Stroke Metric)  Outcome: Progressing Towards Goal  Goal: *Optimal pain control at patient's stated goal  Outcome: Progressing Towards Goal  Goal: *Tolerating diet  Outcome: Progressing Towards Goal  Goal: *Ability to perform ADLs and demonstrates progressive mobility and function  Outcome: Progressing Towards Goal  Goal: *Stroke education continued(Stroke Metric)  Outcome: Progressing Towards Goal

## 2021-06-11 LAB
ANION GAP SERPL CALC-SCNC: 7 MMOL/L (ref 5–15)
BASOPHILS # BLD: 0 K/UL (ref 0–0.1)
BASOPHILS NFR BLD: 0 % (ref 0–1)
BUN SERPL-MCNC: 37 MG/DL (ref 6–20)
BUN/CREAT SERPL: 20 (ref 12–20)
CALCIUM SERPL-MCNC: 8.7 MG/DL (ref 8.5–10.1)
CHLORIDE SERPL-SCNC: 114 MMOL/L (ref 97–108)
CO2 SERPL-SCNC: 21 MMOL/L (ref 21–32)
CREAT SERPL-MCNC: 1.82 MG/DL (ref 0.55–1.02)
CREAT UR-MCNC: 115 MG/DL
DIFFERENTIAL METHOD BLD: ABNORMAL
EOSINOPHIL # BLD: 0.1 K/UL (ref 0–0.4)
EOSINOPHIL NFR BLD: 1 % (ref 0–7)
ERYTHROCYTE [DISTWIDTH] IN BLOOD BY AUTOMATED COUNT: 15.4 % (ref 11.5–14.5)
GLUCOSE BLD STRIP.AUTO-MCNC: 113 MG/DL (ref 65–117)
GLUCOSE BLD STRIP.AUTO-MCNC: 127 MG/DL (ref 65–117)
GLUCOSE BLD STRIP.AUTO-MCNC: 153 MG/DL (ref 65–117)
GLUCOSE BLD STRIP.AUTO-MCNC: 216 MG/DL (ref 65–117)
GLUCOSE BLD STRIP.AUTO-MCNC: 267 MG/DL (ref 65–117)
GLUCOSE SERPL-MCNC: 139 MG/DL (ref 65–100)
HCT VFR BLD AUTO: 27.6 % (ref 35–47)
HGB BLD-MCNC: 8.6 G/DL (ref 11.5–16)
IMM GRANULOCYTES # BLD AUTO: 0 K/UL (ref 0–0.04)
IMM GRANULOCYTES NFR BLD AUTO: 0 % (ref 0–0.5)
KAPPA LC FREE SER-MCNC: 32.9 MG/L (ref 3.3–19.4)
KAPPA LC FREE/LAMBDA FREE SER: 1.42 {RATIO} (ref 0.26–1.65)
LAMBDA LC FREE SERPL-MCNC: 23.1 MG/L (ref 5.7–26.3)
LYMPHOCYTES # BLD: 2.1 K/UL (ref 0.8–3.5)
LYMPHOCYTES NFR BLD: 26 % (ref 12–49)
MCH RBC QN AUTO: 25.7 PG (ref 26–34)
MCHC RBC AUTO-ENTMCNC: 31.2 G/DL (ref 30–36.5)
MCV RBC AUTO: 82.4 FL (ref 80–99)
MICROALBUMIN UR-MCNC: 24.6 MG/DL
MICROALBUMIN/CREAT UR-RTO: 214 MG/G (ref 0–30)
MONOCYTES # BLD: 0.7 K/UL (ref 0–1)
MONOCYTES NFR BLD: 8 % (ref 5–13)
NEUTS SEG # BLD: 5.1 K/UL (ref 1.8–8)
NEUTS SEG NFR BLD: 65 % (ref 32–75)
NRBC # BLD: 0 K/UL (ref 0–0.01)
NRBC BLD-RTO: 0 PER 100 WBC
PLATELET # BLD AUTO: 149 K/UL (ref 150–400)
PMV BLD AUTO: 12.8 FL (ref 8.9–12.9)
POTASSIUM SERPL-SCNC: 3.4 MMOL/L (ref 3.5–5.1)
RBC # BLD AUTO: 3.35 M/UL (ref 3.8–5.2)
SERVICE CMNT-IMP: ABNORMAL
SERVICE CMNT-IMP: NORMAL
SODIUM SERPL-SCNC: 142 MMOL/L (ref 136–145)
WBC # BLD AUTO: 8 K/UL (ref 3.6–11)

## 2021-06-11 PROCEDURE — 74011000250 HC RX REV CODE- 250: Performed by: INTERNAL MEDICINE

## 2021-06-11 PROCEDURE — 36415 COLL VENOUS BLD VENIPUNCTURE: CPT

## 2021-06-11 PROCEDURE — 74011250637 HC RX REV CODE- 250/637: Performed by: FAMILY MEDICINE

## 2021-06-11 PROCEDURE — 97116 GAIT TRAINING THERAPY: CPT

## 2021-06-11 PROCEDURE — 80048 BASIC METABOLIC PNL TOTAL CA: CPT

## 2021-06-11 PROCEDURE — 82043 UR ALBUMIN QUANTITATIVE: CPT

## 2021-06-11 PROCEDURE — 85025 COMPLETE CBC W/AUTO DIFF WBC: CPT

## 2021-06-11 PROCEDURE — 82962 GLUCOSE BLOOD TEST: CPT

## 2021-06-11 PROCEDURE — 74011250637 HC RX REV CODE- 250/637: Performed by: PSYCHIATRY & NEUROLOGY

## 2021-06-11 PROCEDURE — 74011250637 HC RX REV CODE- 250/637: Performed by: INTERNAL MEDICINE

## 2021-06-11 PROCEDURE — 97535 SELF CARE MNGMENT TRAINING: CPT

## 2021-06-11 PROCEDURE — 74011636637 HC RX REV CODE- 636/637: Performed by: INTERNAL MEDICINE

## 2021-06-11 PROCEDURE — 65660000000 HC RM CCU STEPDOWN

## 2021-06-11 PROCEDURE — 94640 AIRWAY INHALATION TREATMENT: CPT

## 2021-06-11 RX ADMIN — HYDRALAZINE HYDROCHLORIDE 25 MG: 25 TABLET, FILM COATED ORAL at 17:10

## 2021-06-11 RX ADMIN — INSULIN LISPRO 4 UNITS: 100 INJECTION, SOLUTION INTRAVENOUS; SUBCUTANEOUS at 17:10

## 2021-06-11 RX ADMIN — TICAGRELOR 30 MG: 60 TABLET ORAL at 08:26

## 2021-06-11 RX ADMIN — BENZONATATE 100 MG: 100 CAPSULE ORAL at 09:37

## 2021-06-11 RX ADMIN — AMLODIPINE BESYLATE 5 MG: 5 TABLET ORAL at 17:10

## 2021-06-11 RX ADMIN — AMLODIPINE BESYLATE 5 MG: 5 TABLET ORAL at 08:26

## 2021-06-11 RX ADMIN — INSULIN LISPRO 4 UNITS: 100 INJECTION, SOLUTION INTRAVENOUS; SUBCUTANEOUS at 12:21

## 2021-06-11 RX ADMIN — PANTOPRAZOLE SODIUM 40 MG: 40 TABLET, DELAYED RELEASE ORAL at 17:10

## 2021-06-11 RX ADMIN — INSULIN LISPRO 3 UNITS: 100 INJECTION, SOLUTION INTRAVENOUS; SUBCUTANEOUS at 08:30

## 2021-06-11 RX ADMIN — BACLOFEN 2.5 MG: 10 TABLET ORAL at 17:10

## 2021-06-11 RX ADMIN — ALBUTEROL SULFATE 2.5 MG: 2.5 SOLUTION RESPIRATORY (INHALATION) at 20:49

## 2021-06-11 RX ADMIN — GUAIFENESIN 600 MG: 600 TABLET, EXTENDED RELEASE ORAL at 17:10

## 2021-06-11 RX ADMIN — TICAGRELOR 30 MG: 60 TABLET ORAL at 20:30

## 2021-06-11 RX ADMIN — BENZONATATE 100 MG: 100 CAPSULE ORAL at 17:10

## 2021-06-11 RX ADMIN — CARVEDILOL 12.5 MG: 12.5 TABLET, FILM COATED ORAL at 17:10

## 2021-06-11 RX ADMIN — INSULIN LISPRO 5 UNITS: 100 INJECTION, SOLUTION INTRAVENOUS; SUBCUTANEOUS at 12:21

## 2021-06-11 RX ADMIN — HYDRALAZINE HYDROCHLORIDE 25 MG: 25 TABLET, FILM COATED ORAL at 21:10

## 2021-06-11 RX ADMIN — HYDRALAZINE HYDROCHLORIDE 25 MG: 25 TABLET, FILM COATED ORAL at 08:26

## 2021-06-11 RX ADMIN — BACLOFEN 2.5 MG: 10 TABLET ORAL at 08:26

## 2021-06-11 RX ADMIN — BACLOFEN 2.5 MG: 10 TABLET ORAL at 21:10

## 2021-06-11 RX ADMIN — ATORVASTATIN CALCIUM 80 MG: 40 TABLET, FILM COATED ORAL at 21:10

## 2021-06-11 RX ADMIN — HUMAN INSULIN 14 UNITS: 100 INJECTION, SUSPENSION SUBCUTANEOUS at 08:26

## 2021-06-11 RX ADMIN — GUAIFENESIN 600 MG: 600 TABLET, EXTENDED RELEASE ORAL at 08:26

## 2021-06-11 RX ADMIN — INSULIN LISPRO 4 UNITS: 100 INJECTION, SOLUTION INTRAVENOUS; SUBCUTANEOUS at 08:26

## 2021-06-11 RX ADMIN — ASPIRIN 81 MG: 81 TABLET, CHEWABLE ORAL at 08:26

## 2021-06-11 RX ADMIN — CARVEDILOL 12.5 MG: 12.5 TABLET, FILM COATED ORAL at 08:26

## 2021-06-11 RX ADMIN — HUMAN INSULIN 14 UNITS: 100 INJECTION, SUSPENSION SUBCUTANEOUS at 17:10

## 2021-06-11 RX ADMIN — FLUTICASONE PROPIONATE 2 SPRAY: 50 SPRAY, METERED NASAL at 22:07

## 2021-06-11 RX ADMIN — PANTOPRAZOLE SODIUM 40 MG: 40 TABLET, DELAYED RELEASE ORAL at 08:26

## 2021-06-11 NOTE — PROGRESS NOTES
[facility]   [address] [address], [address], [facility]  Phone: [phone]   [phone]       Nephrology Progress Note  Laura Kaur     1945     [id number]  Date of Admission : 6/4/2021 06/11/21    CC: Follow up for ARF     Assessment and Plan   DERREK on CKD   - suspect contrast nephropathy   - urine lytes : Intrinsic picture   - UA trace proteinuria likely 2/2 Diabetic Kidney disease.  - Imaging : renal US : unremarkable Kidneys   - Cr relativley stable   - Ok for d/c from renal stand point. Can resume Lisinopril next week   - f/u Myeloma screen , still pending      CKD 3  - baseline Cr per veronica Care : 1.4-1.5 mg/dl   - likely 2/2 HTN +/- DM      TIA      HTN   - hold Lisinopril for now      Anemia :  - f/u Myeloma screen      Obesity         Care Plan discussed with:  Pt       Interval History:  Seen and examined, sitting in chair comfortably. Denies any N/V/CP/SOB  Poor appetitie     Review of Systems: Review of systems not obtained due to patient factors.     Current Medications:   Current Facility-Administered Medications   Medication Dose Route Frequency    loperamide (IMODIUM) capsule 2 mg  2 mg Oral Q4H PRN    bisacodyL (DULCOLAX) suppository 10 mg  10 mg Rectal DAILY PRN    insulin NPH (NOVOLIN N, HUMULIN N) injection 14 Units  14 Units SubCUTAneous ACB&D    hydrALAZINE (APRESOLINE) tablet 25 mg  25 mg Oral TID    polyethylene glycol (MIRALAX) packet 17 g  17 g Oral DAILY    insulin lispro (HUMALOG) injection 4 Units  4 Units SubCUTAneous TID WITH MEALS    amLODIPine (NORVASC) tablet 5 mg  5 mg Oral BID    carvediloL (COREG) tablet 12.5 mg  12.5 mg Oral BID WITH MEALS    albuterol (PROVENTIL VENTOLIN) nebulizer solution 2.5 mg  2.5 mg Nebulization Q6H PRN    ondansetron (ZOFRAN) injection 4 mg  4 mg IntraVENous Q8H PRN    0.9% sodium chloride infusion  100 mL/hr IntraVENous CONTINUOUS    ticagrelor (BRILINTA) tablet 30 mg  30 mg Oral Q12H    baclofen (LIORESAL) tablet 2.5 mg  2.5 mg Oral TID    fluticasone propionate (FLONASE) 50 mcg/actuation nasal spray 2 Spray  2 Spray Both Nostrils DAILY    albuterol (PROVENTIL VENTOLIN) nebulizer solution 2.5 mg  2.5 mg Nebulization Q4H PRN    pantoprazole (PROTONIX) tablet 40 mg  40 mg Oral BID    guaiFENesin ER (MUCINEX) tablet 600 mg  600 mg Oral BID    benzonatate (TESSALON) capsule 100 mg  100 mg Oral TID PRN    glucose chewable tablet 16 g  4 Tablet Oral PRN    dextrose (D50W) injection syrg 12.5-25 g  25-50 mL IntraVENous PRN    glucagon (GLUCAGEN) injection 1 mg  1 mg IntraMUSCular PRN    insulin lispro (HUMALOG) injection   SubCUTAneous AC&HS    acetaminophen (TYLENOL) tablet 650 mg  650 mg Oral Q4H PRN    Or    acetaminophen (TYLENOL) solution 650 mg  650 mg Per NG tube Q4H PRN    Or    acetaminophen (TYLENOL) suppository 650 mg  650 mg Rectal Q4H PRN    aspirin chewable tablet 81 mg  81 mg Oral DAILY    atorvastatin (LIPITOR) tablet 80 mg  80 mg Oral QHS      No Known Allergies    Objective:  Vitals:    Vitals:    06/11/21 0200 06/11/21 0612 06/11/21 0750 06/11/21 0826   BP: (!) 142/68 133/86  136/72   Pulse: 67 64  70   Resp: 16 15     Temp: 98.4 °F (36.9 °C) 98.6 °F (37 °C)     SpO2: 96% 98% 100%    Weight:       Height:         Intake and Output:  No intake/output data recorded. No intake/output data recorded. Physical Examination:  General:  No apparent Distress  HEENT: PERRL,  No Pallor , No Icterus  Neck: Supple,no mass palpable  Lungs : CTA  CVS: RRR, S1 S2 normal, No murmur   Abdomen: Soft, NT, BS +  Extremities: Edema  Skin: No rash or lesions.   MS: No joint swelling, erythema, warmth  Neurologic: non focal, AAO x 3  Psych:  Unable to assess       [x]    High complexity decision making was performed  [x]    Patient is at high-risk of decompensation with multiple organ involvement    Lab Data Personally Reviewed: I have reviewed all the pertinent labs, microbiology data and radiology studies during assessment. Recent Labs     06/11/21  0232 06/09/21  1205    140   K 3.4* 3.9   * 111*   CO2 21 20*   * 193*   BUN 37* 32*   CREA 1.82* 1.75*   CA 8.7 9.2   ALB  --  3.5   ALT  --  18     Recent Labs     06/11/21  0232   WBC 8.0   HGB 8.6*   HCT 27.6*   *     No results found for: SDES  No results found for: CULT  Recent Results (from the past 24 hour(s))   GLUCOSE, POC    Collection Time: 06/10/21  4:55 PM   Result Value Ref Range    Glucose (POC) 92 65 - 117 mg/dL    Performed by Brandon vivar RN    GLUCOSE, POC    Collection Time: 06/10/21  9:15 PM   Result Value Ref Range    Glucose (POC) 89 65 - 117 mg/dL    Performed by Carey Barr    CBC WITH AUTOMATED DIFF    Collection Time: 06/11/21  2:32 AM   Result Value Ref Range    WBC 8.0 3.6 - 11.0 K/uL    RBC 3.35 (L) 3.80 - 5.20 M/uL    HGB 8.6 (L) 11.5 - 16.0 g/dL    HCT 27.6 (L) 35.0 - 47.0 %    MCV 82.4 80.0 - 99.0 FL    MCH 25.7 (L) 26.0 - 34.0 PG    MCHC 31.2 30.0 - 36.5 g/dL    RDW 15.4 (H) 11.5 - 14.5 %    PLATELET 333 (L) 146 - 400 K/uL    MPV 12.8 8.9 - 12.9 FL    NRBC 0.0 0  WBC    ABSOLUTE NRBC 0.00 0.00 - 0.01 K/uL    NEUTROPHILS 65 32 - 75 %    LYMPHOCYTES 26 12 - 49 %    MONOCYTES 8 5 - 13 %    EOSINOPHILS 1 0 - 7 %    BASOPHILS 0 0 - 1 %    IMMATURE GRANULOCYTES 0 0.0 - 0.5 %    ABS. NEUTROPHILS 5.1 1.8 - 8.0 K/UL    ABS. LYMPHOCYTES 2.1 0.8 - 3.5 K/UL    ABS. MONOCYTES 0.7 0.0 - 1.0 K/UL    ABS. EOSINOPHILS 0.1 0.0 - 0.4 K/UL    ABS. BASOPHILS 0.0 0.0 - 0.1 K/UL    ABS. IMM.  GRANS. 0.0 0.00 - 0.04 K/UL    DF AUTOMATED     METABOLIC PANEL, BASIC    Collection Time: 06/11/21  2:32 AM   Result Value Ref Range    Sodium 142 136 - 145 mmol/L    Potassium 3.4 (L) 3.5 - 5.1 mmol/L    Chloride 114 (H) 97 - 108 mmol/L    CO2 21 21 - 32 mmol/L    Anion gap 7 5 - 15 mmol/L    Glucose 139 (H) 65 - 100 mg/dL    BUN 37 (H) 6 - 20 MG/DL    Creatinine 1.82 (H) 0.55 - 1.02 MG/DL    BUN/Creatinine ratio 20 12 - 20      GFR est AA 33 (L) >60 ml/min/1.73m2    GFR est non-AA 27 (L) >60 ml/min/1.73m2    Calcium 8.7 8.5 - 10.1 MG/DL   MICROALBUMIN, UR, RAND W/ MICROALB/CREAT RATIO    Collection Time: 06/11/21  2:32 AM   Result Value Ref Range    Microalbumin,urine random 24.60 MG/DL    Creatinine, urine 115.00 mg/dL    Microalbumin/Creat ratio (mg/g creat) 214 (H) 0 - 30 mg/g   GLUCOSE, POC    Collection Time: 06/11/21  2:49 AM   Result Value Ref Range    Glucose (POC) 153 (H) 65 - 117 mg/dL    Performed by Lillian Keita SSunnyW, POC    Collection Time: 06/11/21  8:04 AM   Result Value Ref Range    Glucose (POC) 216 (H) 65 - 117 mg/dL    Performed by Memo Courtney    GLUCOSE, POC    Collection Time: 06/11/21 12:05 PM   Result Value Ref Range    Glucose (POC) 267 (H) 65 - 117 mg/dL    Performed by Memo Courtney            Total time spent with patient:  xxx   min. Care Plan discussed with:  Patient     Family      RN      Consulting Physician [address] [facility],         I have reviewed the flowsheets. Chart and Pertinent Notes have been reviewed. No change in PMH ,family and social history from Consult note.       Tonia Awad MD

## 2021-06-11 NOTE — PROGRESS NOTES
Problem: Diabetes Self-Management  Goal: *Disease process and treatment process  Description: Define diabetes and identify own type of diabetes; list 3 options for treating diabetes. Outcome: Progressing Towards Goal  Goal: *Incorporating nutritional management into lifestyle  Description: Describe effect of type, amount and timing of food on blood glucose; list 3 methods for planning meals. Outcome: Progressing Towards Goal  Goal: *Incorporating physical activity into lifestyle  Description: State effect of exercise on blood glucose levels. Outcome: Progressing Towards Goal  Goal: *Developing strategies to promote health/change behavior  Description: Define the ABC's of diabetes; identify appropriate screenings, schedule and personal plan for screenings. Outcome: Progressing Towards Goal  Goal: *Using medications safely  Description: State effect of diabetes medications on diabetes; name diabetes medication taking, action and side effects. Outcome: Progressing Towards Goal  Goal: *Monitoring blood glucose, interpreting and using results  Description: Identify recommended blood glucose targets  and personal targets. Outcome: Progressing Towards Goal  Goal: *Prevention, detection, treatment of acute complications  Description: List symptoms of hyper- and hypoglycemia; describe how to treat low blood sugar and actions for lowering  high blood glucose level. Outcome: Progressing Towards Goal  Goal: *Prevention, detection and treatment of chronic complications  Description: Define the natural course of diabetes and describe the relationship of blood glucose levels to long term complications of diabetes.   Outcome: Progressing Towards Goal  Goal: *Developing strategies to address psychosocial issues  Description: Describe feelings about living with diabetes; identify support needed and support network  Outcome: Progressing Towards Goal  Goal: *Insulin pump training  Outcome: Progressing Towards Goal  Goal: *Sick day guidelines  Outcome: Progressing Towards Goal  Goal: *Patient Specific Goal (EDIT GOAL, INSERT TEXT)  Outcome: Progressing Towards Goal     Problem: Patient Education: Go to Patient Education Activity  Goal: Patient/Family Education  Outcome: Progressing Towards Goal     Problem: Falls - Risk of  Goal: *Absence of Falls  Description: Document Maryellen Landry Fall Risk and appropriate interventions in the flowsheet.   Outcome: Progressing Towards Goal  Note: Fall Risk Interventions:  Mobility Interventions: Bed/chair exit alarm    Mentation Interventions: Bed/chair exit alarm    Medication Interventions: Bed/chair exit alarm    Elimination Interventions: Bed/chair exit alarm    History of Falls Interventions: Bed/chair exit alarm         Problem: Patient Education: Go to Patient Education Activity  Goal: Patient/Family Education  Outcome: Progressing Towards Goal     Problem: Patient Education: Go to Patient Education Activity  Goal: Patient/Family Education  Outcome: Progressing Towards Goal     Problem: Patient Education: Go to Patient Education Activity  Goal: Patient/Family Education  Outcome: Progressing Towards Goal     Problem: Discharge Planning  Goal: *Discharge to safe environment  Outcome: Progressing Towards Goal  Goal: *Knowledge of medication management  Outcome: Progressing Towards Goal  Goal: *Knowledge of discharge instructions  Outcome: Progressing Towards Goal     Problem: Patient Education: Go to Patient Education Activity  Goal: Patient/Family Education  Outcome: Progressing Towards Goal     Problem: Breathing Pattern - Ineffective  Goal: *Absence of hypoxia  Outcome: Progressing Towards Goal  Goal: *Use of effective breathing techniques  Outcome: Progressing Towards Goal  Goal: *PALLIATIVE CARE:  Alleviation of Dyspnea  Outcome: Progressing Towards Goal     Problem: Patient Education: Go to Patient Education Activity  Goal: Patient/Family Education  Outcome: Progressing Towards Goal     Problem: TIA/CVA Stroke: Day 2 Until Discharge  Goal: Off Pathway (Use only if patient is Off Pathway)  Outcome: Progressing Towards Goal  Goal: Activity/Safety  Outcome: Progressing Towards Goal  Goal: Diagnostic Test/Procedures  Outcome: Progressing Towards Goal  Goal: Nutrition/Diet  Outcome: Progressing Towards Goal  Goal: Discharge Planning  Outcome: Progressing Towards Goal  Goal: Medications  Outcome: Progressing Towards Goal  Goal: Respiratory  Outcome: Progressing Towards Goal  Goal: Treatments/Interventions/Procedures  Outcome: Progressing Towards Goal  Goal: Psychosocial  Outcome: Progressing Towards Goal  Goal: *Verbalizes anxiety and depression are reduced or absent  Outcome: Progressing Towards Goal  Goal: *Absence of aspiration  Outcome: Progressing Towards Goal  Goal: *Absence of deep venous thrombosis signs and symptoms(Stroke Metric)  Outcome: Progressing Towards Goal  Goal: *Optimal pain control at patient's stated goal  Outcome: Progressing Towards Goal  Goal: *Tolerating diet  Outcome: Progressing Towards Goal  Goal: *Ability to perform ADLs and demonstrates progressive mobility and function  Outcome: Progressing Towards Goal  Goal: *Stroke education continued(Stroke Metric)  Outcome: Progressing Towards Goal

## 2021-06-11 NOTE — PROGRESS NOTES
Bedside shift change report given to ELIDA Islas (oncoming nurse) by Ling Thomas (offgoing nurse). Report included the following information SBAR, Intake/Output, MAR, Cardiac Rhythm NSR and Dual Neuro Assessment.

## 2021-06-11 NOTE — PROGRESS NOTES
Problem: Mobility Impaired (Adult and Pediatric)  Goal: *Acute Goals and Plan of Care (Insert Text)  Description: FUNCTIONAL STATUS PRIOR TO ADMISSION: Patient was modified independent using a single point cane for functional mobility. HOME SUPPORT PRIOR TO ADMISSION: The patient lived with 2 sons who provided support but is left alone for prolonged periods of time. Physical Therapy Goals  Initiated 6/5/2021  1. Patient will move from supine to sit and sit to supine  in bed with modified independence within 7 day(s). 2.  Patient will transfer from bed to chair and chair to bed with modified independence using the least restrictive device within 7 day(s). 3.  Patient will ambulate with modified independence for 200 feet with the least restrictive device within 7 day(s). 4.  Patient will ascend/descend 4 stairs with bilateral handrail(s) with modified independence within 7 day(s). 5.  Patient will improve Quinonez Balance score by 7 points within 7 days. Outcome: Progressing Towards Goal   PHYSICAL THERAPY TREATMENT  Patient: Vijay Sanchez (77 y.o. female)  Date: 6/11/2021  Diagnosis: CVA (cerebral vascular accident) [facility]) [I63.9] <principal problem not specified>       Precautions: Fall  Chart, physical therapy assessment, plan of care and goals were reviewed. ASSESSMENT  Patient continues with skilled PT services and is progressing towards goals. She remains below her modified independent baseline and has required use of a RW since being hospitalized for a CVA. Today, she was received seated in a bedside chair and agreeable to but c/o fatigue. Noted intermittent SOB throughout the session but appeared unrelated to level of exertion and SpO2 remained >96%. Gait training 2x55' using a RW requiring SBA and cues both to keep the RW within her base of support and for posture. She has limited support in the home during the day but lives with 2 sons who provide assist PRN when home.  Continue to recommend discharge to IPR. If not IPR, then home with increased family support and HHPT. Current Level of Function Impacting Discharge (mobility/balance): SBA for gait, c/o fatigue with activity, decreased endurance, reliance on RW         PLAN :  Patient continues to benefit from skilled intervention to address the above impairments. Continue treatment per established plan of care. to address goals. Recommendation for discharge: (in order for the patient to meet his/her long term goals)  Therapy 3 hours per day 5-7 days per week vs HHPT and increased family support      IF patient discharges home will need the following DME: to be determined (TBD)       SUBJECTIVE:   Patient stated I was doing better than this at home. Vanessa Lombardo    OBJECTIVE DATA SUMMARY:   Critical Behavior:  Neurologic State: Alert  Orientation Level: Oriented X4  Cognition: Appropriate decision making, Follows commands, Decreased attention/concentration  Safety/Judgement: Fall prevention  Functional Mobility Training:  Bed Mobility:        Sit to Supine: Contact guard assistance           Transfers:  Sit to Stand: Supervision  Stand to Sit: Supervision        Bed to Chair: Stand-by assistance                    Balance:  Sitting: Intact  Standing: Impaired  Standing - Static: Good (with RW)  Standing - Dynamic : Good (with RW)  Ambulation/Gait Training:  Distance (ft): 55 Feet (ft) (x2)  Assistive Device: Gait belt;Walker, rolling  Ambulation - Level of Assistance: Stand-by assistance        Gait Abnormalities: Decreased step clearance              Speed/Yuki: Shuffled; Slow  Step Length: Left shortened;Right shortened             Activity Tolerance:   Good    After treatment patient left in no apparent distress:   Supine in bed and Call bell within reach    COMMUNICATION/COLLABORATION:   The patients plan of care was discussed with: Registered nurse.      Paty Brambila, PT, DPT   Time Calculation: 18 mins

## 2021-06-11 NOTE — PROGRESS NOTES
[facility] Adult  Hospitalist Group                                                                                          Hospitalist Progress Note  Landa Merlin, NP  Answering service: [phone] OR [phone] from in house phone        Date of Service:  2021  NAME:  Wilner Scott  :  1945  MRN:  [id number]      Admission Summary:   Douglas Hebert a 77 y. o. female who presents with dysarthria  History is primary obtained from the patient  Patient reports that she went to bed at baseline health at around 10:00 last night.  Woke up this morning and had a headache associated with slurred speech.  Patient reports that she has had a stroke in the past and has left-sided deficit.  Patient came to the ER as a code stroke, patient currently reports that all her symptoms have resolved.  Patient denies any other complaints or problems.  Patient was requested to be admitted to the hospitalist service. Quang Mckeon denies any recent falls or injuries. Interval history / Subjective:    Seen and examined patient sitting in bedside chair. States that she is feeling ok today. No new complaints noted. No overnight events noted. Discharge pending insurance auth. 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 - Nephrology consulted.         #Headache with slurred speech, resolved.    #Acute R lateral frontal acute on chronic infarction chronic small vessel ischemic changes as noted in MRI brain on   -Neurology on board, appreciate recommendations.   -NIS on board, appreciate recommendations.  Medical management  -Dual antiplatelet regimen with aspirin and Brilinta per neurology.  Aspirin 81 mg and Brilinta 30 mg twice daily with therapeutic/supratherapeutic platelet function test  -Follow MRI brain without contrast noted   -Follow echocardiogram noted 6/5  -PT/OT/ST noted.  IPR recommendation on discharge.  Accepted at [address] by insurance after PTP.  Only option given home with home health.  Not safety as per experts and treatment team here. Simona Goodson has chosen to appeal.  SNF choice process ongoing while awaiting for results of appeal.  May take 1-2 days.  At least at this point, discharge consideration to home with home health would be a very high risk for patient safety and at this point, based on team recommendations with assessment here, the liability would be solely to the physician of the insurance company who has denied rehab based on chart review and have suggested home with home health as is the only option. Tommy Santiago will continue to follow further clinical progression while patient is continuing her inpatient care.     #Intermittent tearfulness, known Hx of chronic depression  - Supportive treatment   - Psych consulted   - Restarted on Cymbalta      #Subacute cough without any sputum, better  -CXR for cough, unremarkable 6/5  -Continue pantoprazole 40 mg twice daily for possible GERD as a differential of cough  - Continue nebs PRN   - Continue Mucinex   - Encourage cough and deep breathing   - Out of bed for meals.      #DM2 with hyperglycemia.  A1c 8.2  - Monitor BG ac/hs   - Continue NPH 14 units BID and Humalog 4 units with meals   - Insulin sliding scale      #Elevated blood pressure, better with amlodipine and carvedilol, better with wide variation  -Close BP monitoring.  Goal <140/90.  Would appreciate further input from nephrology team as well. ?  Need for MOSES evaluation?  -Adjust amlodipine from 10 mg daily to 5 mg twice daily  -Continue carvedilol to 12.5 mg twice daily  -Continue hydralazine 10 mg 3 times daily     #Hypertriglyceridemia, triglycerides 534.  Total cholesterol 243  Continue Atorvastatin 80mg      #Anemia, acute/chronic.  No baseline.   #Mild thrombocytopenia           Code status: Full   DVT prophylaxis: SCDs    Care Plan discussed with: Patient/Family, Nurse and   Anticipated Disposition: IPR vs SNF placement   Anticipated Discharge: Placement pending      Hospital Problems  Never Reviewed        Codes Class Noted POA    CVA (cerebral vascular accident) ([facility] [address] ICD-10-CM: I63.9  ICD-9-CM: 434.91  6/4/2021 Unknown        Intracranial atherosclerosis ICD-10-CM: N47.0  ICD-9-CM: 437.0  6/4/2021 Yes        Stenosis of right carotid artery ICD-10-CM: I65.21  ICD-9-CM: 433.10  6/4/2021 Yes        Aneurysm of middle cerebral artery ICD-10-CM: I67.1  ICD-9-CM: 437.3  6/4/2021 Yes    Overview Signed 6/5/2021  5:20 PM by Holley Armstrong NP     Left MCA aneurysm                      Review of Systems:   A comprehensive review of systems was negative except for that written in the HPI. Vital Signs:    Last 24hrs VS reviewed since prior progress note. Most recent are:  Visit Vitals  /72   Pulse 70   Temp 98.6 °F (37 °C)   Resp 15   Ht 5' 4\" (1.626 m)   Wt 84.7 kg (186 lb 12.8 oz)   SpO2 100%   BMI 32.06 kg/m²       No intake or output data in the 24 hours ending 06/11/21 1330     Physical Examination:             Constitutional:  No acute distress, cooperative, pleasant    ENT:  Oral mucosa moist, oropharynx benign. Resp:  CTA bilaterally. No wheezing/rhonchi/rales. No accessory muscle use   CV:  Regular rhythm, normal rate, no murmurs, gallops, rubs    GI:  Soft, non distended, non tender. normoactive bowel sounds    Musculoskeletal:  No edema, warm, 2+ pulses throughout    Neurologic:  Moves all extremities. AAOx3, CN II-XII reviewed     Psych:  Good insight, Not anxious nor agitated.        Data Review:    Review and/or order of clinical lab test  Review and/or order of tests in the medicine section of CPT      Labs:     Recent Labs     06/11/21  0232   WBC 8.0   HGB 8.6*   HCT 27.6*   *     Recent Labs     06/11/21  0232 06/09/21  1205    140   K 3.4* 3.9   * 111*   CO2 21 20* BUN 37* 32*   CREA 1.82* 1.75*   * 193*   CA 8.7 9.2     Recent Labs     06/09/21  1205   ALT 18      TBILI 0.4   TP 7.3   ALB 3.5   GLOB 3.8     No results for input(s): INR, PTP, APTT, INREXT in the last 72 hours. No results for input(s): FE, TIBC, PSAT, FERR in the last 72 hours. No results found for: FOL, RBCF   No results for input(s): PH, PCO2, PO2 in the last 72 hours. No results for input(s): CPK, CKNDX, TROIQ in the last 72 hours.     No lab exists for component: CPKMB  Lab Results   Component Value Date/Time    Cholesterol, total 229 (H) 06/09/2021 12:05 PM    HDL Cholesterol 33 06/09/2021 12:05 PM    LDL,Direct 82 06/05/2021 04:48 AM    LDL, calculated 124 (H) 06/09/2021 12:05 PM    Triglyceride 360 (H) 06/09/2021 12:05 PM    CHOL/HDL Ratio 6.9 (H) 06/09/2021 12:05 PM     Lab Results   Component Value Date/Time    Glucose (POC) 267 (H) 06/11/2021 12:05 PM    Glucose (POC) 216 (H) 06/11/2021 08:04 AM    Glucose (POC) 153 (H) 06/11/2021 02:49 AM    Glucose (POC) 89 06/10/2021 09:15 PM    Glucose (POC) 92 06/10/2021 04:55 PM     Lab Results   Component Value Date/Time    Color YELLOW/STRAW 06/08/2021 11:39 PM    Appearance CLEAR 06/08/2021 11:39 PM    Specific gravity 1.011 06/08/2021 11:39 PM    pH (UA) 5.0 06/08/2021 11:39 PM    Protein 30 (A) 06/08/2021 11:39 PM    Glucose Negative 06/08/2021 11:39 PM    Ketone Negative 06/08/2021 11:39 PM    Bilirubin Negative 06/08/2021 11:39 PM    Urobilinogen 0.2 06/08/2021 11:39 PM    Nitrites Negative 06/08/2021 11:39 PM    Leukocyte Esterase Negative 06/08/2021 11:39 PM    Epithelial cells FEW 06/08/2021 11:39 PM    Bacteria Negative 06/08/2021 11:39 PM    WBC 0-4 06/08/2021 11:39 PM    RBC 0-5 06/08/2021 11:39 PM         Medications Reviewed:     Current Facility-Administered Medications   Medication Dose Route Frequency    loperamide (IMODIUM) capsule 2 mg  2 mg Oral Q4H PRN    bisacodyL (DULCOLAX) suppository 10 mg  10 mg Rectal DAILY PRN  insulin NPH (NOVOLIN N, HUMULIN N) injection 14 Units  14 Units SubCUTAneous ACB&D    hydrALAZINE (APRESOLINE) tablet 25 mg  25 mg Oral TID    polyethylene glycol (MIRALAX) packet 17 g  17 g Oral DAILY    insulin lispro (HUMALOG) injection 4 Units  4 Units SubCUTAneous TID WITH MEALS    amLODIPine (NORVASC) tablet 5 mg  5 mg Oral BID    carvediloL (COREG) tablet 12.5 mg  12.5 mg Oral BID WITH MEALS    albuterol (PROVENTIL VENTOLIN) nebulizer solution 2.5 mg  2.5 mg Nebulization Q6H PRN    ondansetron (ZOFRAN) injection 4 mg  4 mg IntraVENous Q8H PRN    0.9% sodium chloride infusion  100 mL/hr IntraVENous CONTINUOUS    ticagrelor (BRILINTA) tablet 30 mg  30 mg Oral Q12H    baclofen (LIORESAL) tablet 2.5 mg  2.5 mg Oral TID    fluticasone propionate (FLONASE) 50 mcg/actuation nasal spray 2 Spray  2 Spray Both Nostrils DAILY    albuterol (PROVENTIL VENTOLIN) nebulizer solution 2.5 mg  2.5 mg Nebulization Q4H PRN    pantoprazole (PROTONIX) tablet 40 mg  40 mg Oral BID    guaiFENesin ER (MUCINEX) tablet 600 mg  600 mg Oral BID    benzonatate (TESSALON) capsule 100 mg  100 mg Oral TID PRN    glucose chewable tablet 16 g  4 Tablet Oral PRN    dextrose (D50W) injection syrg 12.5-25 g  25-50 mL IntraVENous PRN    glucagon (GLUCAGEN) injection 1 mg  1 mg IntraMUSCular PRN    insulin lispro (HUMALOG) injection   SubCUTAneous AC&HS    acetaminophen (TYLENOL) tablet 650 mg  650 mg Oral Q4H PRN    Or    acetaminophen (TYLENOL) solution 650 mg  650 mg Per NG tube Q4H PRN    Or    acetaminophen (TYLENOL) suppository 650 mg  650 mg Rectal Q4H PRN    aspirin chewable tablet 81 mg  81 mg Oral DAILY    atorvastatin (LIPITOR) tablet 80 mg  80 mg Oral QHS     ______________________________________________________________________  EXPECTED LENGTH OF STAY: 2d 21h  ACTUAL LENGTH OF STAY:          7                 Aly Liriano NP

## 2021-06-11 NOTE — PROGRESS NOTES
Problem: Self Care Deficits Care Plan (Adult)  Goal: *Acute Goals and Plan of Care (Insert Text)  Description:   FUNCTIONAL STATUS PRIOR TO ADMISSION: Patient was modified independent using a single point cane for functional mobility, largely Mod I for basic and some instrumental ADLs with inconsistent family assist.     HOME SUPPORT: The patient lived with her sons who work during the day leaving her alone for prolonged periods of time. Occupational Therapy Goals  Initiated 6/5/2021  1. Patient will perform grooming at the sink with supervision/set-up within 7 day(s). 2.  Patient will perform bathing with supervision/set-up within 7 day(s). 3.  Patient will perform upper and lower body dressing with supervision/set-up within 7 day(s). 4.  Patient will perform toilet transfers with supervision/set-up within 7 day(s). 5.  Patient will perform all aspects of toileting with supervision/set-up within 7 day(s). 6.  Patient will participate in upper extremity therapeutic exercise/activities with supervision/set-up for 10 minutes within 7 day(s). 7.  Patient will utilize energy conservation techniques during functional activities with verbal and visual cues within 7 day(s). Outcome: Progressing Towards Goal    OCCUPATIONAL THERAPY TREATMENT  Patient: Tammy Mayes (77 y.o. female)  Date: 6/11/2021  Diagnosis: CVA (cerebral vascular accident) [facility]) [I63.9] <principal problem not specified>       Precautions: Fall  Chart, occupational therapy assessment, plan of care, and goals were reviewed. ASSESSMENT  Patient has demonstrated good functional progress while awaiting for inpatient rehab authorization for several days. Note yesterday [facility] strength/ coordination for ADLs at [facility] level. Today she declined need for ADL practice and instead practiced simulated household IADLs (meal preparation standing at counter and loading clothes washer).   She ambulated within room using RW and practiced multi-level reaching with up to stand-by assistance. She required seated rest break every ~4 minutes due to SOB, although VSS on RA. She engaged in discussion on ADL/ IADL modifications and reports her son can assist as needed with household IADLs. Patient no longer requires physical assistance with all OOB mobility and ADLs. Recommend d/c home with HHOT and PRN family support with household IADLs (meal preparation, groceries, cleaning, laundry, etc.). If family unable to provide this level of support, recommend SNF. Current Level of Function Impacting Discharge (ADLs): supervision to stand-by assistance       PLAN :  Patient continues to benefit from skilled intervention to address the above impairments. Continue treatment per established plan of care to address goals. Recommendation for discharge: (in order for the patient to meet his/her long term goals)  Patient no longer requires physical assistance with all OOB mobility and ADLs. Recommend d/c home with HHOT and PRN family support with household IADLs (meal preparation, groceries, cleaning, laundry, etc.). If family unable to provide this level of support, recommend SNF. This discharge recommendation:  Has been made in collaboration with the attending provider and/or case management    IF patient discharges home will need the following DME: RW.  Patient reports she was not using one PTA but has access to one. SUBJECTIVE:   Patient stated I could do that at home.     OBJECTIVE DATA SUMMARY:   Cognitive/Behavioral Status:  Neurologic State: Alert  Orientation Level: Oriented X4  Cognition: Appropriate decision making; Follows commands;Decreased attention/concentration             Functional Mobility and Transfers for ADLs:    Transfers:  Sit to Stand: Supervision     Bed to Chair: Stand-by assistance    Balance:  Sitting: Intact  Standing: Impaired  Standing - Static: Good  Standing - Dynamic : Good    Pain:  Patient did not report pain    Activity Tolerance:   Good, VSS    After treatment patient left in no apparent distress:   Sitting in chair and Call bell within reach    COMMUNICATION/COLLABORATION:   The patients plan of care was discussed with: Physical therapist, Registered nurse, Physician, and Case management.      Francisco J Russell OT  Time Calculation: 24 mins

## 2021-06-11 NOTE — PROGRESS NOTES
Transition of Care Plan   RUR- Med. 19%   DISPOSITION: IPR ([facility]) vs. SNF ([facility]) pending appeal with [employer]    F/U with PCP/Specialist     Transport: SARINA NORMAN and NP, Codi Weller, spoke with patient at bedside regarding current discharge plan. Patient would like to go home but understands she needs assistance during the day that is not available to her. Patient is still in agreement with waiting on insurance appeal for IPR with SNF [facility]) as backup. Patient stated that her son, Sharan Balbuena, is able to assist in the morning with meals and in the evening but is not there during the day. Patient's daughter, Josee Moura works during the day and patient stated she \"can't count on her\" for assist during day. CM spoke with patient's daughter who is also in agreement with the plan to wait on rehab authorization. ESTER left message for Agusto Johnson [phone] at [address] regarding insurance appeal.    Nena Vasquez M.S.JOSE.

## 2021-06-12 VITALS
DIASTOLIC BLOOD PRESSURE: 92 MMHG | HEART RATE: 61 BPM | OXYGEN SATURATION: 96 % | BODY MASS INDEX: 31.24 KG/M2 | WEIGHT: 183 LBS | SYSTOLIC BLOOD PRESSURE: 138 MMHG | RESPIRATION RATE: 17 BRPM | HEIGHT: 64 IN | TEMPERATURE: 97.6 F

## 2021-06-12 LAB
ALBUMIN SERPL ELPH-MCNC: 3.6 G/DL (ref 2.9–4.4)
ALBUMIN/GLOB SERPL: 1.2 {RATIO} (ref 0.7–1.7)
ALPHA1 GLOB SERPL ELPH-MCNC: 0.2 G/DL (ref 0–0.4)
ALPHA2 GLOB SERPL ELPH-MCNC: 1 G/DL (ref 0.4–1)
B-GLOBULIN SERPL ELPH-MCNC: 0.9 G/DL (ref 0.7–1.3)
COVID-19 RAPID TEST, COVR: NOT DETECTED
GAMMA GLOB SERPL ELPH-MCNC: 0.9 G/DL (ref 0.4–1.8)
GLOBULIN SER CALC-MCNC: 2.9 G/DL (ref 2.2–3.9)
GLUCOSE BLD STRIP.AUTO-MCNC: 196 MG/DL (ref 65–117)
GLUCOSE BLD STRIP.AUTO-MCNC: 305 MG/DL (ref 65–117)
M PROTEIN SERPL ELPH-MCNC: NORMAL G/DL
PROT SERPL-MCNC: 6.5 G/DL (ref 6–8.5)
SERVICE CMNT-IMP: ABNORMAL
SERVICE CMNT-IMP: ABNORMAL
SOURCE, COVRS: NORMAL

## 2021-06-12 PROCEDURE — 74011250637 HC RX REV CODE- 250/637: Performed by: PSYCHIATRY & NEUROLOGY

## 2021-06-12 PROCEDURE — 74011250636 HC RX REV CODE- 250/636: Performed by: INTERNAL MEDICINE

## 2021-06-12 PROCEDURE — 74011636637 HC RX REV CODE- 636/637: Performed by: INTERNAL MEDICINE

## 2021-06-12 PROCEDURE — 74011250637 HC RX REV CODE- 250/637: Performed by: INTERNAL MEDICINE

## 2021-06-12 PROCEDURE — 74011250637 HC RX REV CODE- 250/637: Performed by: FAMILY MEDICINE

## 2021-06-12 PROCEDURE — 82962 GLUCOSE BLOOD TEST: CPT

## 2021-06-12 PROCEDURE — 87635 SARS-COV-2 COVID-19 AMP PRB: CPT

## 2021-06-12 RX ORDER — INSULIN LISPRO 100 [IU]/ML
4 INJECTION, SOLUTION INTRAVENOUS; SUBCUTANEOUS
Qty: 1 VIAL | Refills: 0 | Status: SHIPPED
Start: 2021-06-12

## 2021-06-12 RX ORDER — PANTOPRAZOLE SODIUM 40 MG/1
40 TABLET, DELAYED RELEASE ORAL DAILY
Qty: 30 TABLET | Refills: 0 | Status: SHIPPED
Start: 2021-06-12

## 2021-06-12 RX ORDER — INSULIN LISPRO 100 [IU]/ML
INJECTION, SOLUTION INTRAVENOUS; SUBCUTANEOUS
Qty: 1 VIAL | Refills: 0 | Status: SHIPPED
Start: 2021-06-12

## 2021-06-12 RX ORDER — BACLOFEN 5 MG/1
2.5 TABLET ORAL 3 TIMES DAILY
Qty: 30 TABLET | Refills: 0 | Status: SHIPPED
Start: 2021-06-12

## 2021-06-12 RX ORDER — DULOXETIN HYDROCHLORIDE 30 MG/1
60 CAPSULE, DELAYED RELEASE ORAL DAILY
Qty: 30 CAPSULE | Refills: 0 | Status: SHIPPED
Start: 2021-06-12

## 2021-06-12 RX ORDER — CARVEDILOL 12.5 MG/1
12.5 TABLET ORAL 2 TIMES DAILY WITH MEALS
Qty: 30 TABLET | Refills: 0 | Status: SHIPPED
Start: 2021-06-12

## 2021-06-12 RX ORDER — AMLODIPINE BESYLATE 5 MG/1
5 TABLET ORAL 2 TIMES DAILY
Qty: 30 TABLET | Refills: 0 | Status: SHIPPED
Start: 2021-06-12

## 2021-06-12 RX ORDER — POLYETHYLENE GLYCOL 3350 17 G/17G
17 POWDER, FOR SOLUTION ORAL DAILY
Qty: 30 EACH | Refills: 0 | Status: SHIPPED
Start: 2021-06-13

## 2021-06-12 RX ORDER — GUAIFENESIN 600 MG/1
600 TABLET, EXTENDED RELEASE ORAL 2 TIMES DAILY
Qty: 30 TABLET | Refills: 0 | Status: SHIPPED
Start: 2021-06-12

## 2021-06-12 RX ORDER — HYDRALAZINE HYDROCHLORIDE 25 MG/1
25 TABLET, FILM COATED ORAL 3 TIMES DAILY
Qty: 30 TABLET | Refills: 0 | Status: SHIPPED
Start: 2021-06-12

## 2021-06-12 RX ADMIN — TICAGRELOR 30 MG: 60 TABLET ORAL at 09:08

## 2021-06-12 RX ADMIN — HYDRALAZINE HYDROCHLORIDE 25 MG: 25 TABLET, FILM COATED ORAL at 09:04

## 2021-06-12 RX ADMIN — FLUTICASONE PROPIONATE 2 SPRAY: 50 SPRAY, METERED NASAL at 09:12

## 2021-06-12 RX ADMIN — CARVEDILOL 12.5 MG: 12.5 TABLET, FILM COATED ORAL at 09:04

## 2021-06-12 RX ADMIN — INSULIN LISPRO 2 UNITS: 100 INJECTION, SOLUTION INTRAVENOUS; SUBCUTANEOUS at 09:03

## 2021-06-12 RX ADMIN — INSULIN LISPRO 4 UNITS: 100 INJECTION, SOLUTION INTRAVENOUS; SUBCUTANEOUS at 12:09

## 2021-06-12 RX ADMIN — INSULIN LISPRO 4 UNITS: 100 INJECTION, SOLUTION INTRAVENOUS; SUBCUTANEOUS at 09:03

## 2021-06-12 RX ADMIN — GUAIFENESIN 600 MG: 600 TABLET, EXTENDED RELEASE ORAL at 09:04

## 2021-06-12 RX ADMIN — HUMAN INSULIN 14 UNITS: 100 INJECTION, SUSPENSION SUBCUTANEOUS at 09:03

## 2021-06-12 RX ADMIN — BACLOFEN 2.5 MG: 10 TABLET ORAL at 09:04

## 2021-06-12 RX ADMIN — ACETAMINOPHEN 650 MG: 325 TABLET ORAL at 06:57

## 2021-06-12 RX ADMIN — PANTOPRAZOLE SODIUM 40 MG: 40 TABLET, DELAYED RELEASE ORAL at 09:04

## 2021-06-12 RX ADMIN — ASPIRIN 81 MG: 81 TABLET, CHEWABLE ORAL at 09:04

## 2021-06-12 RX ADMIN — INSULIN LISPRO 7 UNITS: 100 INJECTION, SOLUTION INTRAVENOUS; SUBCUTANEOUS at 12:09

## 2021-06-12 RX ADMIN — AMLODIPINE BESYLATE 5 MG: 5 TABLET ORAL at 09:04

## 2021-06-12 RX ADMIN — ONDANSETRON 4 MG: 2 INJECTION INTRAMUSCULAR; INTRAVENOUS at 11:33

## 2021-06-12 NOTE — PROGRESS NOTES
Transition of Care Plan   RUR- 20%   RAPID COVID test pending; has to finalize prior to DC   Updated clinicals faxed to the facility       [facility] [address] [facility] /[facility]charge [facility]       EMTALA filed and ready for MD to sign at bedside chart. Accepting Physician: Dr Marielle Balbuena    Discharging Physician: Dr. David Chanel    Accepting Representative: Kane Trujillo [address]: Curt \"[address]\"     RN call to report: [phone]    Transport: [address] [place] [address] up time: 1330    Ambulance packet at bedside chart. Family notified: CM met with the family member, Loletha Cabot and they are in agreement with the discharge plan. The attending physician and the primary nurse were notified of the plan. Medicare pt has received, reviewed, and signed 2nd IM letter informing them of their right to appeal the discharge. Signed copy has been placed on pt bedside chart.   CM: [address]. MSJOSE,   [phone]

## 2021-06-12 NOTE — DISCHARGE INSTRUCTIONS
Discharge SNF/Rehab Instructions/LTAC       PATIENT ID: Wilner Scott  MRN: [id number]   YOB: 1945    DATE OF ADMISSION: 6/4/2021 10:38 AM    DATE OF DISCHARGE: 6/12/2021    PRIMARY CARE PROVIDER: Unknown, Provider       ATTENDING PHYSICIAN: Ja Wilson MD  DISCHARGING PROVIDER: Landa Merlin, NP     To contact this individual call [phone] and ask the  to page. If unavailable ask to be transferred the Adult Hospitalist Department. CONSULTATIONS: IP CONSULT TO HOSPITALIST  IP CONSULT TO NEUROLOGY  IP CONSULT TO NEUROINTERVENTIONAL SURGERY  IP CONSULT TO NEPHROLOGY  IP CONSULT TO PSYCHIATRY    PROCEDURES/SURGERIES: * No surgery found *    ADMITTING DIAGNOSES & HOSPITAL COURSE:   Douglas Hebert a 77 y. o. female who presents with dysarthria  History is primary obtained from the patient  Patient reports that she went to bed at baseline health at around 10:00 last night.  Woke up this morning and had a headache associated with slurred speech.  Patient reports that she has had a stroke in the past and has left-sided deficit.  Patient came to the ER as a code stroke, patient currently reports that all her symptoms have resolved.  Patient denies any other complaints or problems.  Patient was requested to be admitted to the hospitalist service. 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         #Headache with slurred speech, resolved.    #Acute R lateral frontal acute on chronic infarction chronic small vessel ischemic changes as noted in MRI brain on 6/4  -Neurology on board, appreciate recommendations. -NIS consulted.  Medical management  -Dual antiplatelet regimen with aspirin and Brilinta per neurology.  Aspirin 81 mg and Brilinta 30 mg twice daily with therapeutic/supratherapeutic platelet function test  - PT/OT      #Intermittent tearfulness, known Hx of chronic depression  - Supportive treatment   - Psych consulted   - Restarted on Cymbalta 30mg      #Subacute cough without any sputum, better  -CXR for cough, unremarkable 6/5  -Continue pantoprazole 40 mg twice daily for possible GERD as a differential of cough  - Continue nebs PRN   - Continue Mucinex   - Encourage cough and deep breathing   - Out of bed for meals.      #DM2 with hyperglycemia.  A1c 8.2  - Monitor BG ac/hs   - Continue NPH 14 units BID and Humalog 4 units with meals   - Insulin sliding scale      #Elevated blood pressure, better with amlodipine and carvedilol, better with wide variation  -Close BP monitoring.  Goal <140/90.  Would appreciate further input from nephrology team as well. ?  Need for MOSES evaluation?  -Adjust amlodipine from 10 mg daily to 5 mg twice daily  -Continue carvedilol to 12.5 mg twice daily  -Continue hydralazine 10 mg 3 times daily  - May restart Lisinopril in 1 week if indicated.      #Hypertriglyceridemia, triglycerides 534.  Total cholesterol 243  Continue Atorvastatin 80mg      #Anemia, acute/chronic.  No baseline.   #Mild thrombocytopenia         PENDING TEST RESULTS:   At the time of discharge the following test results are still pending: ***    FOLLOW UP APPOINTMENTS:    Follow-up Information     Follow up With Specialties Details Why Contact Info    PCP  Schedule an appointment as soon as possible for a visit For follow up within one week of discharge from [facility]  Schedule an appointment as soon as possible for a visit For follow up  [address]:   Take medications as prescribed   Monitor kidney functions     DIET: Diabetic Diet      TUBE FEEDING INSTRUCTIONS: None     OXYGEN / BiPAP SETTINGS: None     ACTIVITY: PT/OT Eval and Treat    WOUND CARE: None     EQUIPMENT needed: Per PT/OT       DISCHARGE MEDICATIONS:   See Medication Reconciliation Form      NOTIFY YOUR PHYSICIAN FOR ANY OF THE FOLLOWING:   Fever over 101 degrees for 24 hours. Chest pain, shortness of breath, fever, chills, nausea, vomiting, diarrhea, change in mentation, falling, weakness, bleeding. Severe pain or pain not relieved by medications. Or, any other signs or symptoms that you may have questions about.     DISPOSITION:    Home With:   OT  PT  HH  RN      X SNF/Inpatient Rehab/LTAC    Independent/assisted living    Hospice    Other:       PATIENT CONDITION AT DISCHARGE:     Functional status    Poor    X Deconditioned     Independent      Cognition    X Lucid     Forgetful     Dementia      Catheters/lines (plus indication)    Carlos     PICC     PEG    X None      Code status   X  Full code     DNR      PHYSICAL EXAMINATION AT DISCHARGE:   Refer to Progress Note***      CHRONIC MEDICAL DIAGNOSES:  Problem List as of 6/12/2021 Never Reviewed        Codes Class Noted - Resolved    CVA (cerebral vascular accident) [facility]) ICD-10-CM: I63.9  ICD-9-CM: 434.91  6/4/2021 - Present        Intracranial atherosclerosis ICD-10-CM: I65.4  ICD-9-CM: 437.0  6/4/2021 - Present        Stenosis of right carotid artery ICD-10-CM: I65.21  ICD-9-CM: 433.10  6/4/2021 - Present        Aneurysm of middle cerebral artery ICD-10-CM: I67.1  ICD-9-CM: 437.3  6/4/2021 - Present    Overview Signed 6/5/2021  5:20 PM by Tramaine Rainey NP     Left MCA aneurysm                    Signed:   Madhav Akbar NP  6/12/2021  10:38 AM

## 2021-06-12 NOTE — PROGRESS NOTES
Bedside shift change report given to Ricardo yRan RN (oncoming nurse) by [facility], RN (offgoing nurse). Report included the following information SBAR, Cardiac Rhythm nsr and Dual Neuro Assessment.

## 2021-06-12 NOTE — ROUTINE PROCESS
I have reviewed discharge instructions with the patient. The patient verbalized understanding. Discharge medications reviewed with patient and appropriate educational materials and side effects teaching were provided. PIV and tele discontinued. Patient discharged to [address] [place] [address] via [facility]. EMTALA completed and copy on chart. Signed AVS on chart. Report called to Ramón Spence at [address] [place] [address]. Patient to follow up with PCP and nephro after discharge home from [facility]. Patient's daughter in room upon discharge.

## 2021-06-12 NOTE — DISCHARGE SUMMARY
Discharge Summary       PATIENT ID: Veronica Singer  MRN: [id number]   YOB: 1945    DATE OF ADMISSION: 6/4/2021 10:38 AM    DATE OF DISCHARGE: 06/12/2021  PRIMARY CARE PROVIDER: Unknown, Provider     ATTENDING PHYSICIAN: Chey Mckeon MD  DISCHARGING PROVIDER: Naty Castro NP    To contact this individual call [phone] and ask the  to page. If unavailable ask to be transferred the Adult Hospitalist Department. CONSULTATIONS: IP CONSULT TO HOSPITALIST  IP CONSULT TO NEUROLOGY  IP CONSULT TO NEUROINTERVENTIONAL SURGERY  IP CONSULT TO NEPHROLOGY  IP CONSULT TO PSYCHIATRY    PROCEDURES/SURGERIES: * No surgery found *    ADMITTING DIAGNOSES & HOSPITAL COURSE:   Andrew Lawrence a 77 y. o. female who presents with dysarthria  History is primary obtained from the patient  Patient reports that she went to bed at baseline health at around 10:00 last night.  Woke up this morning and had a headache associated with slurred speech.  Patient reports that she has had a stroke in the past and has left-sided deficit.  Patient came to the ER as a code stroke, patient currently reports that all her symptoms have resolved.  Patient denies any other complaints or problems.  Patient was requested to be admitted to the hospitalist service. 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         #Headache with slurred speech, resolved.    #Acute R lateral frontal acute on chronic infarction chronic small vessel ischemic changes as noted in MRI brain on 6/4  -Neurology on board, appreciate recommendations. -NIS consulted.  Medical management  -Dual antiplatelet regimen with aspirin and Brilinta per neurology.  Aspirin 81 mg and Brilinta 30 mg twice daily with therapeutic/supratherapeutic platelet function test  - PT/OT      #Intermittent tearfulness, known Hx of chronic depression  - Supportive treatment   - Psych consulted   - Restarted on Cymbalta 30mg      #Subacute cough without any sputum, better  -CXR for cough, unremarkable 6/5  -Continue pantoprazole 40 mg twice daily for possible GERD as a differential of cough  - Continue nebs PRN   - Continue Mucinex   - Encourage cough and deep breathing   - Out of bed for meals.      #DM2 with hyperglycemia.  A1c 8.2  - Monitor BG ac/hs   - Continue NPH 14 units BID and Humalog 4 units with meals   - Insulin sliding scale      #Elevated blood pressure, better with amlodipine and carvedilol, better with wide variation  -Close BP monitoring.  Goal <140/90.  Would appreciate further input from nephrology team as well. ?  Need for MOSES evaluation?  -Adjust amlodipine from 10 mg daily to 5 mg twice daily  -Continue carvedilol to 12.5 mg twice daily  -Continue hydralazine 10 mg 3 times daily  - May restart Lisinopril in 1 week if indicated.      #Hypertriglyceridemia, triglycerides 534.  Total cholesterol 243  Continue Atorvastatin 80mg      #Anemia, acute/chronic.  No baseline. #Mild thrombocytopenia        ADDITIONAL CARE RECOMMENDATIONS:   Take medications as prescribed.    Monitor kidney functions    PENDING TEST RESULTS:   At the time of discharge the following test results are still pending: None     FOLLOW UP APPOINTMENTS:    Follow-up Information     Follow up With Specialties Details Why Contact Info    PCP  Schedule an appointment as soon as possible for a visit For follow up within one week of discharge from [facility]  Schedule an appointment as soon as possible for a visit For follow up  [address]             DIET: Diabetic Diet    ACTIVITY: PT/OT Eval and Treat    WOUND CARE: None     EQUIPMENT needed: Per PT/OT      DISCHARGE MEDICATIONS:  Current Discharge Medication List      START taking these medications    Details   baclofen 5 mg tab Take 2.5 mg by mouth three (3) times daily. Qty: 30 Tablet, Refills: 0  Start date: 6/12/2021      ticagrelor (BRILINTA) 60 mg tab tablet Take 0.5 Tablets by mouth every twelve (12) hours every twelve (12) hours. Qty: 60 Tablet, Refills: 0  Start date: 6/12/2021      polyethylene glycol (MIRALAX) 17 gram packet Take 1 Packet by mouth daily. Qty: 30 Each, Refills: 0  Start date: 6/13/2021      hydrALAZINE (APRESOLINE) 25 mg tablet Take 1 Tablet by mouth three (3) times daily. Qty: 30 Tablet, Refills: 0  Start date: 6/12/2021      !! insulin lispro (HUMALOG) 100 unit/mL injection INITIATE CORRECTIVE INSULIN PROTOCOL (JEAN CARLOS):  RX JEAN CARLOS Normal Sensitivity (Average weight)    AC (before meals), Q6H, and Q4H CORRECTIONAL SCALE only For Blood Sugar (mg/dl) of :             180-199=2 units            200-249=3 units  250-299=5 units  300-349=7 units  350 or greater = Call MD  Give in addition to basal medications. Do Not Hold for NPO    BEDTIME CORRECTIONAL sliding scale when scheduled:  200-249=2 units  250-299=3 units   300-349=4 units  350 or greater = Call MD  Give in addition to basal medications. Do Not Hold for NPO Fast Acting - Administer Immediately - or within 15 minutes of start of meal, if mealtime coverage. Qty: 1 Vial, Refills: 0  Start date: 6/12/2021      guaiFENesin ER (MUCINEX) 600 mg ER tablet Take 1 Tablet by mouth two (2) times a day. Qty: 30 Tablet, Refills: 0  Start date: 6/12/2021      insulin NPH (NOVOLIN N, HUMULIN N) 100 unit/mL injection 14 Units by SubCUTAneous route two (2) times a day. Qty: 1 Vial, Refills: 0  Start date: 6/12/2021      !! insulin lispro (HUMALOG) 100 unit/mL injection 4 Units by SubCUTAneous route Before breakfast, lunch, and dinner. Qty: 1 Vial, Refills: 0  Start date: 6/12/2021       !! - Potential duplicate medications found. Please discuss with provider.       CONTINUE these medications which have CHANGED    Details   amLODIPine (NORVASC) 5 mg tablet Take 1 Tablet by mouth two (2) times a day. Qty: 30 Tablet, Refills: 0  Start date: 6/12/2021      carvediloL (COREG) 12.5 mg tablet Take 1 Tablet by mouth two (2) times daily (with meals). Qty: 30 Tablet, Refills: 0  Start date: 6/12/2021      DULoxetine (CYMBALTA) 30 mg capsule Take 2 Capsules by mouth daily. Qty: 30 Capsule, Refills: 0  Start date: 6/12/2021         CONTINUE these medications which have NOT CHANGED    Details   acetaminophen (TYLENOL) 500 mg tablet Take 500 mg by mouth every six (6) hours as needed for Pain. albuterol (PROVENTIL HFA, VENTOLIN HFA, PROAIR HFA) 90 mcg/actuation inhaler Take 2 Puffs by inhalation every six (6) hours as needed for Wheezing. aspirin delayed-release 81 mg tablet Take 81 mg by mouth daily. atorvastatin (LIPITOR) 80 mg tablet Take 80 mg by mouth daily. benzonatate (TESSALON) 100 mg capsule Take 100 mg by mouth two (2) times daily as needed for Cough. diclofenac (VOLTAREN) 1 % gel Apply 2 g to affected area two (2) times a day. fluticasone propionate (FLONASE) 50 mcg/actuation nasal spray 2 Sprays by Both Nostrils route daily. albuterol-ipratropium (DUO-NEB) 2.5 mg-0.5 mg/3 ml nebu 3 mL by Nebulization route every six (6) hours as needed for Wheezing. ondansetron (ZOFRAN ODT) 8 mg disintegrating tablet Take 8 mg by mouth daily. ergocalciferol (Vitamin D2) 1,250 mcg (50,000 unit) capsule Take 50,000 Units by mouth every seven (7) days.          STOP taking these medications       gabapentin (NEURONTIN) 100 mg capsule Comments:   Reason for Stopping:         glipiZIDE (GLUCOTROL) 10 mg tablet Comments:   Reason for Stopping:         lisinopriL (PRINIVIL, ZESTRIL) 10 mg tablet Comments:   Reason for Stopping:         loratadine (CLARITIN) 10 mg tablet Comments:   Reason for Stopping:         magnesium 250 mg tab Comments:   Reason for Stopping: QUEtiapine (SEROquel) 25 mg tablet Comments:   Reason for Stopping:         senna (Senna) 8.6 mg tablet Comments:   Reason for Stopping:                 NOTIFY YOUR PHYSICIAN FOR ANY OF THE FOLLOWING:   Fever over 101 degrees for 24 hours. Chest pain, shortness of breath, fever, chills, nausea, vomiting, diarrhea, change in mentation, falling, weakness, bleeding. Severe pain or pain not relieved by medications. Or, any other signs or symptoms that you may have questions about. DISPOSITION:    Home With:   OT  PT  HH  RN      X Long term SNF/Inpatient Rehab    Independent/assisted living    Hospice    Other:       PATIENT CONDITION AT DISCHARGE:     Functional status    Poor    X Deconditioned     Independent      Cognition   X  Lucid     Forgetful     Dementia      Catheters/lines (plus indication)    Carlos     PICC     PEG    X None      Code status    X Full code     DNR      PHYSICAL EXAMINATION AT DISCHARGE:  General:          Alert, cooperative, no distress, appears stated age. HEENT:           Atraumatic, anicteric sclerae, pink conjunctivae                          No oral ulcers, mucosa moist, throat clear, dentition fair  Neck:               Supple, symmetrical  Lungs:             Clear to auscultation bilaterally. No Wheezing or Rhonchi. No rales. Chest wall:      No tenderness  No Accessory muscle use. Heart:              Regular  rhythm,  No  murmur   No edema  Abdomen:        Soft, non-tender. Not distended. Bowel sounds normal  Extremities:     No cyanosis. No clubbing,                            Skin turgor normal, Capillary refill normal  Skin:                Not pale. Not Jaundiced  No rashes   Psych:             Not anxious or agitated.   Neurologic:      Alert, moves all extremities, answers questions appropriately and responds to commands       CHRONIC MEDICAL DIAGNOSES:  Problem List as of 6/12/2021 Never Reviewed        Codes Class Noted - Resolved    CVA (cerebral vascular accident) [facility]) ICD-10-CM: I63.9  ICD-9-CM: 434.91  6/4/2021 - Present        Intracranial atherosclerosis ICD-10-CM: I67.2  ICD-9-CM: 437.0  6/4/2021 - Present        Stenosis of right carotid artery ICD-10-CM: I65.21  ICD-9-CM: 433.10  6/4/2021 - Present        Aneurysm of middle cerebral artery ICD-10-CM: I67.1  ICD-9-CM: 437.3  6/4/2021 - Present    Overview Signed 6/5/2021  5:20 PM by Sanjuana Sosa NP     Left MCA aneurysm                    Greater than 45 minutes were spent with the patient on counseling and coordination of care    Signed:   Virginia Finn NP  6/12/2021  10:38 AM

## 2021-06-12 NOTE — PROGRESS NOTES
Problem: Diabetes Self-Management  Goal: *Disease process and treatment process  Description: Define diabetes and identify own type of diabetes; list 3 options for treating diabetes. Outcome: Progressing Towards Goal  Goal: *Incorporating nutritional management into lifestyle  Description: Describe effect of type, amount and timing of food on blood glucose; list 3 methods for planning meals. Outcome: Progressing Towards Goal  Goal: *Incorporating physical activity into lifestyle  Description: State effect of exercise on blood glucose levels. Outcome: Progressing Towards Goal  Goal: *Developing strategies to promote health/change behavior  Description: Define the ABC's of diabetes; identify appropriate screenings, schedule and personal plan for screenings. Outcome: Progressing Towards Goal  Goal: *Using medications safely  Description: State effect of diabetes medications on diabetes; name diabetes medication taking, action and side effects. Outcome: Progressing Towards Goal  Goal: *Monitoring blood glucose, interpreting and using results  Description: Identify recommended blood glucose targets  and personal targets. Outcome: Progressing Towards Goal  Goal: *Prevention, detection, treatment of acute complications  Description: List symptoms of hyper- and hypoglycemia; describe how to treat low blood sugar and actions for lowering  high blood glucose level. Outcome: Progressing Towards Goal  Goal: *Prevention, detection and treatment of chronic complications  Description: Define the natural course of diabetes and describe the relationship of blood glucose levels to long term complications of diabetes.   Outcome: Progressing Towards Goal  Goal: *Developing strategies to address psychosocial issues  Description: Describe feelings about living with diabetes; identify support needed and support network  Outcome: Progressing Towards Goal  Goal: *Insulin pump training  Outcome: Progressing Towards Goal  Goal: *Sick day guidelines  Outcome: Progressing Towards Goal  Goal: *Patient Specific Goal (EDIT GOAL, INSERT TEXT)  Outcome: Progressing Towards Goal     Problem: Falls - Risk of  Goal: *Absence of Falls  Description: Document Kenisha Fall Risk and appropriate interventions in the flowsheet.   Outcome: Progressing Towards Goal  Note: Fall Risk Interventions:  Mobility Interventions: Assess mobility with egress test, Bed/chair exit alarm, Communicate number of staff needed for ambulation/transfer, Patient to call before getting OOB, PT Consult for mobility concerns, PT Consult for assist device competence, Utilize walker, cane, or other assistive device    Mentation Interventions: Adequate sleep, hydration, pain control, Bed/chair exit alarm, Door open when patient unattended, Evaluate medications/consider consulting pharmacy, Increase mobility, More frequent rounding, Reorient patient, Room close to nurse's station, Toileting rounds, Update white board    Medication Interventions: Bed/chair exit alarm, Evaluate medications/consider consulting pharmacy, Patient to call before getting OOB, Teach patient to arise slowly    Elimination Interventions: Bed/chair exit alarm, Call light in reach, Patient to call for help with toileting needs, Toilet paper/wipes in reach, Toileting schedule/hourly rounds, Stay With Me (per policy)    History of Falls Interventions: Bed/chair exit alarm, Consult care management for discharge planning, Door open when patient unattended, Evaluate medications/consider consulting pharmacy, Room close to nurse's station         Problem: Breathing Pattern - Ineffective  Goal: *Absence of hypoxia  Outcome: Progressing Towards Goal  Goal: *Use of effective breathing techniques  Outcome: Progressing Towards Goal  Goal: *PALLIATIVE CARE:  Alleviation of Dyspnea  Outcome: Progressing Towards Goal     Problem: Pressure Injury - Risk of  Goal: *Prevention of pressure injury  Description: Document Walt Scale and appropriate interventions in the flowsheet. Outcome: Progressing Towards Goal  Note: Pressure Injury Interventions:  Sensory Interventions: Assess changes in LOC, Check visual cues for pain, Float heels, Keep linens dry and wrinkle-free, Minimize linen layers, Pressure redistribution bed/mattress (bed type), Turn and reposition approx.  every two hours (pillows and wedges if needed)    Moisture Interventions: Absorbent underpads, Check for incontinence Q2 hours and as needed, Limit adult briefs, Maintain skin hydration (lotion/cream), Minimize layers, Moisture barrier    Activity Interventions: Increase time out of bed, Pressure redistribution bed/mattress(bed type), PT/OT evaluation    Mobility Interventions: Float heels, HOB 30 degrees or less, Pressure redistribution bed/mattress (bed type), PT/OT evaluation    Nutrition Interventions: Document food/fluid/supplement intake    Friction and Shear Interventions: HOB 30 degrees or less, Lift sheet, Minimize layers                Problem: TIA/CVA Stroke: Day 2 Until Discharge  Goal: Off Pathway (Use only if patient is Off Pathway)  Outcome: Progressing Towards Goal  Goal: Activity/Safety  Outcome: Progressing Towards Goal  Goal: Diagnostic Test/Procedures  Outcome: Progressing Towards Goal  Goal: Nutrition/Diet  Outcome: Progressing Towards Goal  Goal: Discharge Planning  Outcome: Progressing Towards Goal  Goal: Medications  Outcome: Progressing Towards Goal  Goal: Respiratory  Outcome: Progressing Towards Goal  Goal: Treatments/Interventions/Procedures  Outcome: Progressing Towards Goal  Goal: Psychosocial  Outcome: Progressing Towards Goal  Goal: *Verbalizes anxiety and depression are reduced or absent  Outcome: Progressing Towards Goal  Goal: *Absence of aspiration  Outcome: Progressing Towards Goal  Goal: *Absence of deep venous thrombosis signs and symptoms(Stroke Metric)  Outcome: Progressing Towards Goal  Goal: *Optimal pain control at patient's stated goal  Outcome: Progressing Towards Goal  Goal: *Tolerating diet  Outcome: Progressing Towards Goal  Goal: *Ability to perform ADLs and demonstrates progressive mobility and function  Outcome: Progressing Towards Goal  Goal: *Stroke education continued(Stroke Metric)  Outcome: Progressing Towards Goal

## 2021-06-14 LAB
IGA SERPL-MCNC: 165 MG/DL (ref 64–422)
IGG SERPL-MCNC: 938 MG/DL (ref 586–1602)
IGM SERPL-MCNC: 72 MG/DL (ref 26–217)
PROT PATTERN SERPL IFE-IMP: NORMAL

## 2021-07-08 ENCOUNTER — TELEPHONE (OUTPATIENT)
Dept: NEUROSURGERY | Age: 76
End: 2021-07-08

## 2022-03-18 PROBLEM — I63.9 CVA (CEREBRAL VASCULAR ACCIDENT) (HCC): Status: ACTIVE | Noted: 2021-06-04

## 2022-03-19 PROBLEM — I67.1 ANEURYSM OF MIDDLE CEREBRAL ARTERY: Status: ACTIVE | Noted: 2021-06-04

## 2022-03-19 PROBLEM — I67.2 INTRACRANIAL ATHEROSCLEROSIS: Status: ACTIVE | Noted: 2021-06-04

## 2022-03-19 PROBLEM — I65.21 STENOSIS OF RIGHT CAROTID ARTERY: Status: ACTIVE | Noted: 2021-06-04

## 2023-01-06 ENCOUNTER — HOSPITAL ENCOUNTER (EMERGENCY)
Age: 78
Discharge: HOME | End: 2023-01-06
Attending: STUDENT IN AN ORGANIZED HEALTH CARE EDUCATION/TRAINING PROGRAM
Payer: MEDICARE

## 2023-01-06 ENCOUNTER — APPOINTMENT (OUTPATIENT)
Dept: CT IMAGING | Age: 78
End: 2023-01-06
Attending: STUDENT IN AN ORGANIZED HEALTH CARE EDUCATION/TRAINING PROGRAM
Payer: MEDICARE

## 2023-01-06 VITALS
HEART RATE: 74 BPM | BODY MASS INDEX: 28.79 KG/M2 | WEIGHT: 168.6 LBS | TEMPERATURE: 97.6 F | SYSTOLIC BLOOD PRESSURE: 180 MMHG | HEIGHT: 64 IN | DIASTOLIC BLOOD PRESSURE: 84 MMHG | RESPIRATION RATE: 17 BRPM | OXYGEN SATURATION: 98 %

## 2023-01-06 DIAGNOSIS — K29.00 ACUTE GASTRITIS WITHOUT HEMORRHAGE, UNSPECIFIED GASTRITIS TYPE: Primary | ICD-10-CM

## 2023-01-06 DIAGNOSIS — R10.9 ACUTE ABDOMINAL PAIN: ICD-10-CM

## 2023-01-06 DIAGNOSIS — R91.8 LUNG MASS: ICD-10-CM

## 2023-01-06 DIAGNOSIS — R11.10 ACUTE VOMITING: ICD-10-CM

## 2023-01-06 LAB
ALBUMIN SERPL-MCNC: 3.6 G/DL (ref 3.5–5)
ALBUMIN/GLOB SERPL: 0.9 {RATIO} (ref 1.1–2.2)
ALP SERPL-CCNC: 105 U/L (ref 45–117)
ALT SERPL-CCNC: 15 U/L (ref 12–78)
ANION GAP SERPL CALC-SCNC: 13 MMOL/L (ref 5–15)
APPEARANCE UR: CLEAR
AST SERPL-CCNC: 15 U/L (ref 15–37)
BACTERIA URNS QL MICRO: NEGATIVE /HPF
BASOPHILS # BLD: 0 K/UL (ref 0–0.1)
BASOPHILS NFR BLD: 1 % (ref 0–1)
BILIRUB SERPL-MCNC: 0.2 MG/DL (ref 0.2–1)
BILIRUB UR QL: NEGATIVE
BUN SERPL-MCNC: 33 MG/DL (ref 6–20)
BUN/CREAT SERPL: 18 (ref 12–20)
CALCIUM SERPL-MCNC: 8.8 MG/DL (ref 8.5–10.1)
CHLORIDE SERPL-SCNC: 107 MMOL/L (ref 97–108)
CO2 SERPL-SCNC: 20 MMOL/L (ref 21–32)
COLOR UR: ABNORMAL
CREAT SERPL-MCNC: 1.79 MG/DL (ref 0.55–1.02)
DIFFERENTIAL METHOD BLD: ABNORMAL
EOSINOPHIL # BLD: 0.1 K/UL (ref 0–0.4)
EOSINOPHIL NFR BLD: 1 % (ref 0–7)
EPITH CASTS URNS QL MICRO: ABNORMAL /LPF
ERYTHROCYTE [DISTWIDTH] IN BLOOD BY AUTOMATED COUNT: 15.9 % (ref 11.5–14.5)
FLUAV RNA SPEC QL NAA+PROBE: NOT DETECTED
FLUBV RNA SPEC QL NAA+PROBE: NOT DETECTED
GLOBULIN SER CALC-MCNC: 3.8 G/DL (ref 2–4)
GLUCOSE SERPL-MCNC: 283 MG/DL (ref 65–100)
GLUCOSE UR STRIP.AUTO-MCNC: NEGATIVE MG/DL
HCT VFR BLD AUTO: 26.5 % (ref 35–47)
HGB BLD-MCNC: 8.5 G/DL (ref 11.5–16)
HGB UR QL STRIP: NEGATIVE
IMM GRANULOCYTES # BLD AUTO: 0 K/UL (ref 0–0.04)
IMM GRANULOCYTES NFR BLD AUTO: 1 % (ref 0–0.5)
KETONES UR QL STRIP.AUTO: NEGATIVE MG/DL
LEUKOCYTE ESTERASE UR QL STRIP.AUTO: NEGATIVE
LIPASE SERPL-CCNC: 78 U/L (ref 73–393)
LYMPHOCYTES # BLD: 2.3 K/UL (ref 0.8–3.5)
LYMPHOCYTES NFR BLD: 26 % (ref 12–49)
MCH RBC QN AUTO: 25.4 PG (ref 26–34)
MCHC RBC AUTO-ENTMCNC: 32.1 G/DL (ref 30–36.5)
MCV RBC AUTO: 79.1 FL (ref 80–99)
MONOCYTES # BLD: 0.5 K/UL (ref 0–1)
MONOCYTES NFR BLD: 6 % (ref 5–13)
NEUTS SEG # BLD: 5.8 K/UL (ref 1.8–8)
NEUTS SEG NFR BLD: 65 % (ref 32–75)
NITRITE UR QL STRIP.AUTO: NEGATIVE
NRBC # BLD: 0 K/UL (ref 0–0.01)
NRBC BLD-RTO: 0 PER 100 WBC
PH UR STRIP: 5 [PH] (ref 5–8)
PLATELET # BLD AUTO: 163 K/UL (ref 150–400)
PMV BLD AUTO: 13 FL (ref 8.9–12.9)
POTASSIUM SERPL-SCNC: 3.7 MMOL/L (ref 3.5–5.1)
PROT SERPL-MCNC: 7.4 G/DL (ref 6.4–8.2)
PROT UR STRIP-MCNC: 100 MG/DL
RBC # BLD AUTO: 3.35 M/UL (ref 3.8–5.2)
RBC #/AREA URNS HPF: ABNORMAL /HPF (ref 0–5)
SARS-COV-2, COV2: NOT DETECTED
SODIUM SERPL-SCNC: 140 MMOL/L (ref 136–145)
SP GR UR REFRACTOMETRY: 1.01
UA: UC IF INDICATED,UAUC: ABNORMAL
UROBILINOGEN UR QL STRIP.AUTO: 0.2 EU/DL (ref 0.2–1)
WBC # BLD AUTO: 8.7 K/UL (ref 3.6–11)
WBC URNS QL MICRO: ABNORMAL /HPF (ref 0–4)

## 2023-01-06 PROCEDURE — 99284 EMERGENCY DEPT VISIT MOD MDM: CPT

## 2023-01-06 PROCEDURE — 87636 SARSCOV2 & INF A&B AMP PRB: CPT

## 2023-01-06 PROCEDURE — 81001 URINALYSIS AUTO W/SCOPE: CPT

## 2023-01-06 PROCEDURE — 74011000250 HC RX REV CODE- 250: Performed by: STUDENT IN AN ORGANIZED HEALTH CARE EDUCATION/TRAINING PROGRAM

## 2023-01-06 PROCEDURE — 96375 TX/PRO/DX INJ NEW DRUG ADDON: CPT

## 2023-01-06 PROCEDURE — 85025 COMPLETE CBC W/AUTO DIFF WBC: CPT

## 2023-01-06 PROCEDURE — 80053 COMPREHEN METABOLIC PANEL: CPT

## 2023-01-06 PROCEDURE — 74011250636 HC RX REV CODE- 250/636: Performed by: STUDENT IN AN ORGANIZED HEALTH CARE EDUCATION/TRAINING PROGRAM

## 2023-01-06 PROCEDURE — 96374 THER/PROPH/DIAG INJ IV PUSH: CPT

## 2023-01-06 PROCEDURE — 83690 ASSAY OF LIPASE: CPT

## 2023-01-06 PROCEDURE — 74176 CT ABD & PELVIS W/O CONTRAST: CPT

## 2023-01-06 PROCEDURE — 36415 COLL VENOUS BLD VENIPUNCTURE: CPT

## 2023-01-06 RX ORDER — FAMOTIDINE 20 MG/1
20 TABLET, FILM COATED ORAL
Qty: 20 TABLET | Refills: 0 | Status: SHIPPED | OUTPATIENT
Start: 2023-01-06 | End: 2023-01-26

## 2023-01-06 RX ORDER — ONDANSETRON 2 MG/ML
4 INJECTION INTRAMUSCULAR; INTRAVENOUS
Status: COMPLETED | OUTPATIENT
Start: 2023-01-06 | End: 2023-01-06

## 2023-01-06 RX ORDER — ONDANSETRON 4 MG/1
4 TABLET, FILM COATED ORAL
Qty: 10 TABLET | Refills: 0 | Status: SHIPPED | OUTPATIENT
Start: 2023-01-06

## 2023-01-06 RX ADMIN — ONDANSETRON 4 MG: 2 INJECTION INTRAMUSCULAR; INTRAVENOUS at 14:02

## 2023-01-06 RX ADMIN — SODIUM CHLORIDE 500 ML: 9 INJECTION, SOLUTION INTRAVENOUS at 14:02

## 2023-01-06 RX ADMIN — FAMOTIDINE 20 MG: 10 INJECTION, SOLUTION INTRAVENOUS at 14:02

## 2023-01-06 NOTE — ED NOTES

## 2023-01-06 NOTE — ED TRIAGE NOTES
Pt arrived to ED via EMS complaining of upper abdominal pain x this morning. Per pt, she began feeling sick and began having N/V w/ upper abdominal pain. Pt denies diarrhea or constipation. Pt denies changes to her diet. Pt denies cough, fevers or chills.

## 2023-01-06 NOTE — ED NOTES
Discharge instructions were given to the patient by Richard Zaragoza RN. The patient left the Emergency Department ambulatory, alert and oriented and in no acute distress with 2 prescriptions. The patient was encouraged to call or return to the ED for worsening issues or problems and was encouraged to schedule a follow up appointment for continuing care. The patient verbalized understanding of discharge instructions and prescriptions, all questions were answered. The patient has no further concerns at this time.

## 2023-01-06 NOTE — DISCHARGE INSTRUCTIONS
You have a suspicious nodule in your lung that needs to be followed closely by your primary care doctor

## 2023-01-06 NOTE — ED PROVIDER NOTES
EMERGENCY DEPARTMENT HISTORY AND PHYSICAL EXAM      Date: 1/6/2023  Patient Name: Roge Rowland    History of Presenting Illness     Chief Complaint   Patient presents with    Vomiting         HPI: History From: Patient, her primary care provider, History limited by: none  Roge Rowland, 69 y.o. female presents to the ED with cc of nausea and vomiting. This started this morning. She reports 3 episodes of emesis, was having diarrhea initially but that has improved. She reports associated periumbilical abdominal discomfort. She denies any dysuria or hematuria, no fevers, she denies prior abdominal surgeries. There are no other complaints, changes, or physical findings at this time. PCP: Meredith Hammond MD    No current facility-administered medications on file prior to encounter. Current Outpatient Medications on File Prior to Encounter   Medication Sig Dispense Refill    amLODIPine (NORVASC) 5 mg tablet Take 1 Tablet by mouth two (2) times a day. 30 Tablet 0    carvediloL (COREG) 12.5 mg tablet Take 1 Tablet by mouth two (2) times daily (with meals). 30 Tablet 0    baclofen 5 mg tab Take 2.5 mg by mouth three (3) times daily. 30 Tablet 0    ticagrelor (BRILINTA) 60 mg tab tablet Take 0.5 Tablets by mouth every twelve (12) hours every twelve (12) hours. 60 Tablet 0    polyethylene glycol (MIRALAX) 17 gram packet Take 1 Packet by mouth daily. 30 Each 0    hydrALAZINE (APRESOLINE) 25 mg tablet Take 1 Tablet by mouth three (3) times daily. 30 Tablet 0    insulin lispro (HUMALOG) 100 unit/mL injection INITIATE CORRECTIVE INSULIN PROTOCOL (JEAN CARLOS):  RX JEAN CARLOS Normal Sensitivity (Average weight)    AC (before meals), Q6H, and Q4H CORRECTIONAL SCALE only For Blood Sugar (mg/dl) of :             180-199=2 units            200-249=3 units  250-299=5 units  300-349=7 units  350 or greater = Call MD  Give in addition to basal medications.   Do Not Hold for NPO    BEDTIME CORRECTIONAL sliding scale when scheduled:  200-249=2 units  250-299=3 units   300-349=4 units  350 or greater = Call MD  Give in addition to basal medications. Do Not Hold for NPO Fast Acting - Administer Immediately - or within 15 minutes of start of meal, if mealtime coverage. 1 Vial 0    guaiFENesin ER (MUCINEX) 600 mg ER tablet Take 1 Tablet by mouth two (2) times a day. 30 Tablet 0    insulin NPH (NOVOLIN N, HUMULIN N) 100 unit/mL injection 14 Units by SubCUTAneous route two (2) times a day. 1 Vial 0    insulin lispro (HUMALOG) 100 unit/mL injection 4 Units by SubCUTAneous route Before breakfast, lunch, and dinner. 1 Vial 0    DULoxetine (CYMBALTA) 30 mg capsule Take 2 Capsules by mouth daily. 30 Capsule 0    pantoprazole (PROTONIX) 40 mg tablet Take 1 Tablet by mouth daily. 30 Tablet 0    acetaminophen (TYLENOL) 500 mg tablet Take 500 mg by mouth every six (6) hours as needed for Pain. albuterol (PROVENTIL HFA, VENTOLIN HFA, PROAIR HFA) 90 mcg/actuation inhaler Take 2 Puffs by inhalation every six (6) hours as needed for Wheezing. aspirin delayed-release 81 mg tablet Take 81 mg by mouth daily. atorvastatin (LIPITOR) 80 mg tablet Take 80 mg by mouth daily. benzonatate (TESSALON) 100 mg capsule Take 100 mg by mouth two (2) times daily as needed for Cough. diclofenac (VOLTAREN) 1 % gel Apply 2 g to affected area two (2) times a day. fluticasone propionate (FLONASE) 50 mcg/actuation nasal spray 2 Sprays by Both Nostrils route daily. albuterol-ipratropium (DUO-NEB) 2.5 mg-0.5 mg/3 ml nebu 3 mL by Nebulization route every six (6) hours as needed for Wheezing. ondansetron (ZOFRAN ODT) 8 mg disintegrating tablet Take 8 mg by mouth daily. ergocalciferol (ERGOCALCIFEROL) 1,250 mcg (50,000 unit) capsule Take 50,000 Units by mouth every seven (7) days. Past History     Past Medical History:  History reviewed. No pertinent past medical history.     Past Surgical History:  No past surgical history on file.    Family History:  History reviewed. No pertinent family history. Social History: Allergies:  No Known Allergies      Physical Exam   Physical Exam  Constitutional:       General: She is not in acute distress. Appearance: She is not toxic-appearing. HENT:      Head: Normocephalic. Eyes:      Extraocular Movements: Extraocular movements intact. Cardiovascular:      Rate and Rhythm: Normal rate and regular rhythm. Pulmonary:      Effort: Pulmonary effort is normal.      Breath sounds: Normal breath sounds. Abdominal:      Palpations: Abdomen is soft. Tenderness: There is abdominal tenderness. Comments: Mild tenderness in the periumbilical region without guarding or rebound tenderness   Skin:     General: Skin is warm and dry. Neurological:      Mental Status: She is alert. Mental status is at baseline. Comments: Speech is slow but easily understandable   Psychiatric:         Mood and Affect: Mood normal.       Diagnostic Study Results     Labs -     Recent Results (from the past 24 hour(s))   CBC WITH AUTOMATED DIFF    Collection Time: 01/06/23  2:04 PM   Result Value Ref Range    WBC 8.7 3.6 - 11.0 K/uL    RBC 3.35 (L) 3.80 - 5.20 M/uL    HGB 8.5 (L) 11.5 - 16.0 g/dL    HCT 26.5 (L) 35.0 - 47.0 %    MCV 79.1 (L) 80.0 - 99.0 FL    MCH 25.4 (L) 26.0 - 34.0 PG    MCHC 32.1 30.0 - 36.5 g/dL    RDW 15.9 (H) 11.5 - 14.5 %    PLATELET 968 004 - 942 K/uL    MPV 13.0 (H) 8.9 - 12.9 FL    NRBC 0.0 0  WBC    ABSOLUTE NRBC 0.00 0.00 - 0.01 K/uL    NEUTROPHILS 65 32 - 75 %    LYMPHOCYTES 26 12 - 49 %    MONOCYTES 6 5 - 13 %    EOSINOPHILS 1 0 - 7 %    BASOPHILS 1 0 - 1 %    IMMATURE GRANULOCYTES 1 (H) 0.0 - 0.5 %    ABS. NEUTROPHILS 5.8 1.8 - 8.0 K/UL    ABS. LYMPHOCYTES 2.3 0.8 - 3.5 K/UL    ABS. MONOCYTES 0.5 0.0 - 1.0 K/UL    ABS. EOSINOPHILS 0.1 0.0 - 0.4 K/UL    ABS. BASOPHILS 0.0 0.0 - 0.1 K/UL    ABS. IMM.  GRANS. 0.0 0.00 - 0.04 K/UL    DF AUTOMATED     METABOLIC PANEL, COMPREHENSIVE    Collection Time: 01/06/23  2:04 PM   Result Value Ref Range    Sodium 140 136 - 145 mmol/L    Potassium 3.7 3.5 - 5.1 mmol/L    Chloride 107 97 - 108 mmol/L    CO2 20 (L) 21 - 32 mmol/L    Anion gap 13 5 - 15 mmol/L    Glucose 283 (H) 65 - 100 mg/dL    BUN 33 (H) 6 - 20 MG/DL    Creatinine 1.79 (H) 0.55 - 1.02 MG/DL    BUN/Creatinine ratio 18 12 - 20      eGFR 29 (L) >60 ml/min/1.73m2    Calcium 8.8 8.5 - 10.1 MG/DL    Bilirubin, total 0.2 0.2 - 1.0 MG/DL    ALT (SGPT) 15 12 - 78 U/L    AST (SGOT) 15 15 - 37 U/L    Alk. phosphatase 105 45 - 117 U/L    Protein, total 7.4 6.4 - 8.2 g/dL    Albumin 3.6 3.5 - 5.0 g/dL    Globulin 3.8 2.0 - 4.0 g/dL    A-G Ratio 0.9 (L) 1.1 - 2.2     LIPASE    Collection Time: 01/06/23  2:04 PM   Result Value Ref Range    Lipase 78 73 - 393 U/L   COVID-19 WITH INFLUENZA A/B    Collection Time: 01/06/23  2:04 PM   Result Value Ref Range    SARS-CoV-2 by PCR Not detected NOTD      Influenza A by PCR Not detected      Influenza B by PCR Not detected     URINALYSIS W/ REFLEX CULTURE    Collection Time: 01/06/23  3:19 PM    Specimen: Urine   Result Value Ref Range    Color YELLOW/STRAW      Appearance CLEAR CLEAR      Specific gravity 1.010      pH (UA) 5.0 5.0 - 8.0      Protein 100 (A) NEG mg/dL    Glucose Negative NEG mg/dL    Ketone Negative NEG mg/dL    Bilirubin Negative NEG      Blood Negative NEG      Urobilinogen 0.2 0.2 - 1.0 EU/dL    Nitrites Negative NEG      Leukocyte Esterase Negative NEG      WBC 0-4 0 - 4 /hpf    RBC 0-5 0 - 5 /hpf    Epithelial cells FEW FEW /lpf    Bacteria Negative NEG /hpf    UA:UC IF INDICATED CULTURE NOT INDICATED BY UA RESULT CNI         Radiologic Studies -   CT ABD PELV WO CONT   Final Result      1. Irregular 2.0 x 1.2 x 1.2 cm right middle lobe nodule suspicious for   bronchogenic carcinoma. 2. Punctate nonobstructing left renal calculus. No hydronephrosis.    3. Gastric wall thickening may be due to underdistention, correlate for clinical   signs of gastritis. 4. Fat-containing ventral hernias. CT Results  (Last 48 hours)                 01/06/23 1535  CT ABD PELV WO CONT Final result    Impression:      1. Irregular 2.0 x 1.2 x 1.2 cm right middle lobe nodule suspicious for   bronchogenic carcinoma. 2. Punctate nonobstructing left renal calculus. No hydronephrosis. 3. Gastric wall thickening may be due to underdistention, correlate for clinical   signs of gastritis. 4. Fat-containing ventral hernias. Narrative:  INDICATION: periumbilical pain       COMPARISON: None       TECHNIQUE:    Noncontrast thin axial images were obtained through the abdomen and pelvis. Coronal and sagittal reconstructions were generated. CT dose reduction was   achieved through use of a standardized protocol tailored for this examination   and automatic exposure control for dose modulation. NOTE:  The absence of IV contrast reduces the sensitivity for evaluation of   visceral organs and vasculature including presence of mass lesions,   hemodynamically significant stenoses, dissections, mucosal abnormalities etc.       FINDINGS:    LUNG BASES: Irregular 2.0 x 1.2 x 1.2 cm nodule right middle lobe. LIVER: No mass or biliary dilatation. GALLBLADDER: Surgically absent. SPLEEN: No enlargement or lesion. PANCREAS: No mass or ductal dilatation. ADRENALS: No mass. KIDNEYS: Punctate nonobstructing calculus lower pole left kidney. No   hydronephrosis. 1.9 cm cyst left kidney. GI TRACT:  No evidence of bowel obstruction. Moderate amount of colonic stool. Gastric wall thickening may be due to underdistention. PERITONEUM: No free air or free fluid. APPENDIX: Unremarkable. RETROPERITONEUM: No aortic aneurysm. LYMPH NODES:  None enlarged. ADDITIONAL COMMENTS: Fat-containing umbilical/supraumbilical ventral hernias,   small. Extensive arterial calcification throughout the abdominal vasculature.        URINARY BLADDER: Unremarkable. REPRODUCTIVE ORGANS: Uterus is present. LYMPH NODES:  None enlarged. FREE FLUID:  None. BONES: No destructive bone lesion. ADDITIONAL COMMENTS: N/A. CXR Results  (Last 48 hours)      None              Medical Decision Making   I am the first provider for this patient. I reviewed the vital signs, available nursing notes, past medical history, past surgical history, family history and social history. Vital Signs-Reviewed the patient's vital signs. Patient Vitals for the past 24 hrs:   Temp Pulse Resp BP SpO2   01/06/23 1800 -- 74 17 (!) 180/84 98 %   01/06/23 1746 -- 78 17 (!) 191/107 99 %   01/06/23 1731 -- 78 13 (!) 176/94 97 %   01/06/23 1722 -- 79 13 -- 98 %   01/06/23 1721 -- 83 18 -- 97 %   01/06/23 1719 -- 81 22 (!) 171/93 98 %   01/06/23 1521 -- 77 20 (!) 168/78 99 %   01/06/23 1506 -- 80 15 (!) 166/86 --   01/06/23 1451 -- 79 16 (!) 178/83 --   01/06/23 1436 -- 78 13 (!) 171/75 92 %   01/06/23 1421 -- 75 13 (!) 175/74 95 %   01/06/23 1406 -- -- -- (!) 174/66 --   01/06/23 1351 -- -- -- (!) 178/65 97 %   01/06/23 1348 97.6 °F (36.4 °C) 74 16 (!) 178/65 99 %         Provider Notes (Medical Decision Making):   78-year-old presenting with nausea and vomiting. Differential includes gastroenteritis, gastritis or GERD, viral syndrome, pancreatitis, ileus, early obstruction, diverticulitis, cystitis. She is afebrile nontoxic-appearing, unlikely significant systemic infection, no peritoneal signs on abdominal exam.  CT scan to assess for any acute intra-abdominal infection or obstruction    ED Course:     Initial assessment performed. The patients presenting problems have been discussed, and they are in agreement with the care plan formulated and outlined with them. I have encouraged them to ask questions as they arise throughout their visit.     Medications   sodium chloride 0.9 % bolus infusion 500 mL (0 mL IntraVENous IV Completed 1/6/23 6563)   ondansetron (Imtiaz Sctot) injection 4 mg (4 mg IntraVENous Given 1/6/23 1402)   famotidine (PF) (PEPCID) 20 mg in 0.9% sodium chloride 10 mL injection (20 mg IntraVENous Given 1/6/23 1402)        ED Course as of 01/06/23 1842 Fri Jan 06, 2023   1356 Spoke with nurse practitioner at [facility], who is part of her outpatient primary care provider team, who states that the patient does have some complex logistical issues with her outpatient care that they are currently working through, and if at all possible, want to find ways to prevent further admissions, as she tends to have quite a bit of setback every time she is admitted to the hospital.  She has clearly had involved care team, and will take this into account in considering her disposition. [CM]      ED Course User Index  [CM] Kassidy Braden MD        Chart is reviewed, she was admitted to outside hospital with discharge summary from 10/24/2022 reviewed by myself, she was treated for confusion concern for possible TIA. CBC negative for leukocytosis, UA not suggestive of UTI, basic metabolic panel with elevated creatinine of 1.79, not significantly changed from baseline per chart review, actually improved from recent. Otherwise no worrisome electrolyte abnormalities. CT abdomen pelvis shows nodule in the lung suspicious for carcinoma. Patient is informed of this finding and need for very close follow-up. Otherwise shows gastric wall thickening which may be the cause of her abdominal discomfort, otherwise no acute intra-abdominal emergency. On reevaluation, patient is resting comfortably and states that they feel improved. She is hypertensive but otherwise vitals normal.  Patient is counseled on supportive care and return precautions. Will return to the ED for any worsening pain, intractable vomiting, fevers, or any new or worrisome symptoms. Will followup with primary care doctor within 3 days.     Critical Care Time:         Disposition:  Home  [facility]  results have been reviewed with her. She has been counseled regarding her diagnosis, treatment, and plan. She verbally conveys understanding and agreement of the signs, symptoms, diagnosis, treatment and prognosis and additionally agrees to follow up as discussed. She also agrees with the care-plan and conveys that all of her questions have been answered. I have also provided discharge instructions for her that include: educational information regarding their diagnosis and treatment, and list of reasons why they would want to return to the ED prior to their follow-up appointment, should her condition change. PLAN:  1. Discharge Medication List as of 1/6/2023  4:59 PM        START taking these medications    Details   famotidine (Pepcid) 20 mg tablet Take 1 Tablet by mouth nightly for 20 days. , Normal, Disp-20 Tablet, R-0      ondansetron hcl (Zofran) 4 mg tablet Take 1 Tablet by mouth every eight (8) hours as needed for Nausea or Vomiting., Normal, Disp-10 Tablet, R-0           CONTINUE these medications which have NOT CHANGED    Details   amLODIPine (NORVASC) 5 mg tablet Take 1 Tablet by mouth two (2) times a day., No Print, Disp-30 Tablet, R-0      carvediloL (COREG) 12.5 mg tablet Take 1 Tablet by mouth two (2) times daily (with meals). , No Print, Disp-30 Tablet, R-0      baclofen 5 mg tab Take 2.5 mg by mouth three (3) times daily. , No Print, Disp-30 Tablet, R-0      ticagrelor (BRILINTA) 60 mg tab tablet Take 0.5 Tablets by mouth every twelve (12) hours every twelve (12) hours. , No Print, Disp-60 Tablet, R-0      polyethylene glycol (MIRALAX) 17 gram packet Take 1 Packet by mouth daily. , No Print, Disp-30 Each, R-0      hydrALAZINE (APRESOLINE) 25 mg tablet Take 1 Tablet by mouth three (3) times daily. , No Print, Disp-30 Tablet, R-0      !! insulin lispro (HUMALOG) 100 unit/mL injection INITIATE CORRECTIVE INSULIN PROTOCOL (JEAN CARLOS):  RX JEAN CARLOS Normal Sensitivity (Average weight)    AC (before meals), Q6H, and Q4H CORRECTIONAL SCALE only For Blood Sugar (mg/dl) of :             180-199=2 units            200-249=3 units  250-299=5 units  300-3 49=7 units  350 or greater = Call MD  Give in addition to basal medications. Do Not Hold for NPO    BEDTIME CORRECTIONAL sliding scale when scheduled:  200-249=2 units  250-299=3 units   300-349=4 units  350 or greater = Call MD  Give in addition to bas al medications. Do Not Hold for NPO Fast Acting - Administer Immediately - or within 15 minutes of start of meal, if mealtime coverage., No Print, Disp-1 Vial, R-0      guaiFENesin ER (MUCINEX) 600 mg ER tablet Take 1 Tablet by mouth two (2) times a day., No Print, Disp-30 Tablet, R-0      insulin NPH (NOVOLIN N, HUMULIN N) 100 unit/mL injection 14 Units by SubCUTAneous route two (2) times a day., No Print, Disp-1 Vial, R-0      !! insulin lispro (HUMALOG) 100 unit/mL injection 4 Units by SubCUTAneous route Before breakfast, lunch, and dinner., No Print, Disp-1 Vial, R-0      DULoxetine (CYMBALTA) 30 mg capsule Take 2 Capsules by mouth daily. , No Print, Disp-30 Capsule, R-0      pantoprazole (PROTONIX) 40 mg tablet Take 1 Tablet by mouth daily. , No Print, Disp-30 Tablet, R-0      acetaminophen (TYLENOL) 500 mg tablet Take 500 mg by mouth every six (6) hours as needed for Pain., Historical Med      albuterol (PROVENTIL HFA, VENTOLIN HFA, PROAIR HFA) 90 mcg/actuation inhaler Take 2 Puffs by inhalation every six (6) hours as needed for Wheezing., Historical Med      aspirin delayed-release 81 mg tablet Take 81 mg by mouth daily. , Historical Med      atorvastatin (LIPITOR) 80 mg tablet Take 80 mg by mouth daily. , Historical Med      benzonatate (TESSALON) 100 mg capsule Take 100 mg by mouth two (2) times daily as needed for Cough., Historical Med      diclofenac (VOLTAREN) 1 % gel Apply 2 g to affected area two (2) times a day., Historical Med      fluticasone propionate (FLONASE) 50 mcg/actuation nasal spray 2 Sprays by Both Nostrils route daily. , Historical Med      albuterol-ipratropium (DUO-NEB) 2.5 mg-0.5 mg/3 ml nebu 3 mL by Nebulization route every six (6) hours as needed for Wheezing., Historical Med      ondansetron (ZOFRAN ODT) 8 mg disintegrating tablet Take 8 mg by mouth daily. , Historical Med      ergocalciferol (ERGOCALCIFEROL) 1,250 mcg (50,000 unit) capsule Take 50,000 Units by mouth every seven (7) days. , Historical Med       !! - Potential duplicate medications found. Please discuss with provider.         2.   Follow-up Information       Follow up With Specialties Details Why Contact Info    Lashae Sinha MD Physical Medicine and Rehabilitation Physician Schedule an appointment as soon as possible for a visit in 3 days  Patient not available to ask      [facility] EMERGENCY DEPT Emergency Medicine  As needed, If symptoms worsen [place]  [phone]          Return to ED if worse     Diagnosis     Clinical Impression: Acute nausea and vomiting, acute gastritis, pulmonary nodule

## 2023-05-24 RX ORDER — PANTOPRAZOLE SODIUM 40 MG/1
40 TABLET, DELAYED RELEASE ORAL DAILY
COMMUNITY
Start: 2021-06-12

## 2023-05-24 RX ORDER — ATORVASTATIN CALCIUM 80 MG/1
80 TABLET, FILM COATED ORAL DAILY
COMMUNITY

## 2023-05-24 RX ORDER — ACETAMINOPHEN 500 MG
500 TABLET ORAL EVERY 6 HOURS PRN
COMMUNITY

## 2023-05-24 RX ORDER — ASPIRIN 81 MG/1
81 TABLET ORAL DAILY
COMMUNITY

## 2023-05-24 RX ORDER — FLUTICASONE PROPIONATE 50 MCG
2 SPRAY, SUSPENSION (ML) NASAL DAILY
COMMUNITY

## 2023-05-24 RX ORDER — BENZONATATE 100 MG/1
100 CAPSULE ORAL 2 TIMES DAILY PRN
COMMUNITY

## 2023-05-24 RX ORDER — ONDANSETRON 8 MG/1
8 TABLET, ORALLY DISINTEGRATING ORAL DAILY
COMMUNITY

## 2023-05-24 RX ORDER — HYDRALAZINE HYDROCHLORIDE 25 MG/1
25 TABLET, FILM COATED ORAL 3 TIMES DAILY
COMMUNITY
Start: 2021-06-12

## 2023-05-24 RX ORDER — CARVEDILOL 12.5 MG/1
12.5 TABLET ORAL 2 TIMES DAILY WITH MEALS
COMMUNITY
Start: 2021-06-12

## 2023-05-24 RX ORDER — INSULIN LISPRO 100 [IU]/ML
4 INJECTION, SOLUTION INTRAVENOUS; SUBCUTANEOUS
COMMUNITY
Start: 2021-06-12

## 2023-05-24 RX ORDER — AMLODIPINE BESYLATE 5 MG/1
5 TABLET ORAL 2 TIMES DAILY
COMMUNITY
Start: 2021-06-12

## 2023-05-24 RX ORDER — BACLOFEN 5 MG/1
2.5 TABLET ORAL 3 TIMES DAILY
COMMUNITY
Start: 2021-06-12

## 2023-05-24 RX ORDER — POLYETHYLENE GLYCOL 3350 17 G/17G
17 POWDER, FOR SOLUTION ORAL DAILY
COMMUNITY
Start: 2021-06-13

## 2023-05-24 RX ORDER — GUAIFENESIN 600 MG/1
600 TABLET, EXTENDED RELEASE ORAL 2 TIMES DAILY
COMMUNITY
Start: 2021-06-12

## 2023-05-24 RX ORDER — IPRATROPIUM BROMIDE AND ALBUTEROL SULFATE 2.5; .5 MG/3ML; MG/3ML
3 SOLUTION RESPIRATORY (INHALATION) EVERY 6 HOURS PRN
COMMUNITY

## 2023-05-24 RX ORDER — DULOXETIN HYDROCHLORIDE 30 MG/1
60 CAPSULE, DELAYED RELEASE ORAL DAILY
COMMUNITY
Start: 2021-06-12

## 2023-05-24 RX ORDER — ONDANSETRON 4 MG/1
4 TABLET, FILM COATED ORAL EVERY 8 HOURS PRN
COMMUNITY
Start: 2023-01-06

## 2023-05-24 RX ORDER — ALBUTEROL SULFATE 90 UG/1
2 AEROSOL, METERED RESPIRATORY (INHALATION) EVERY 6 HOURS PRN
COMMUNITY

## 2023-05-24 RX ORDER — ERGOCALCIFEROL 1.25 MG/1
50000 CAPSULE ORAL
COMMUNITY